# Patient Record
Sex: FEMALE | Race: WHITE | Employment: OTHER
[De-identification: names, ages, dates, MRNs, and addresses within clinical notes are randomized per-mention and may not be internally consistent; named-entity substitution may affect disease eponyms.]

---

## 2017-01-30 RX ORDER — SIMVASTATIN 40 MG
TABLET ORAL
Qty: 90 TABLET | Refills: 0 | Status: SHIPPED | OUTPATIENT
Start: 2017-01-30 | End: 2017-08-31 | Stop reason: SDUPTHER

## 2017-02-06 ENCOUNTER — CARE COORDINATION (OUTPATIENT)
Dept: INTERNAL MEDICINE | Facility: CLINIC | Age: 72
End: 2017-02-06

## 2017-02-07 ENCOUNTER — CARE COORDINATION (OUTPATIENT)
Dept: INTERNAL MEDICINE | Facility: CLINIC | Age: 72
End: 2017-02-07

## 2017-02-07 RX ORDER — LISINOPRIL 10 MG/1
TABLET ORAL
Qty: 90 TABLET | Refills: 0 | Status: SHIPPED | OUTPATIENT
Start: 2017-02-07 | End: 2017-03-07 | Stop reason: SDUPTHER

## 2017-03-07 ENCOUNTER — OFFICE VISIT (OUTPATIENT)
Dept: INTERNAL MEDICINE | Facility: CLINIC | Age: 72
End: 2017-03-07

## 2017-03-07 VITALS
HEART RATE: 63 BPM | WEIGHT: 163 LBS | DIASTOLIC BLOOD PRESSURE: 85 MMHG | SYSTOLIC BLOOD PRESSURE: 140 MMHG | BODY MASS INDEX: 27.83 KG/M2 | OXYGEN SATURATION: 97 % | HEIGHT: 64 IN

## 2017-03-07 DIAGNOSIS — I48.91 ATRIAL FIBRILLATION, UNSPECIFIED TYPE (HCC): ICD-10-CM

## 2017-03-07 DIAGNOSIS — I10 ESSENTIAL HYPERTENSION: ICD-10-CM

## 2017-03-07 DIAGNOSIS — Z23 NEED FOR PROPHYLACTIC VACCINATION AGAINST STREPTOCOCCUS PNEUMONIAE (PNEUMOCOCCUS) AND INFLUENZA: Primary | ICD-10-CM

## 2017-03-07 DIAGNOSIS — K21.9 GASTROESOPHAGEAL REFLUX DISEASE WITHOUT ESOPHAGITIS: ICD-10-CM

## 2017-03-07 PROCEDURE — 99214 OFFICE O/P EST MOD 30 MIN: CPT | Performed by: INTERNAL MEDICINE

## 2017-03-07 PROCEDURE — 90670 PCV13 VACCINE IM: CPT | Performed by: INTERNAL MEDICINE

## 2017-03-07 PROCEDURE — G0009 ADMIN PNEUMOCOCCAL VACCINE: HCPCS | Performed by: INTERNAL MEDICINE

## 2017-03-07 RX ORDER — LISINOPRIL 10 MG/1
10 TABLET ORAL DAILY
Qty: 90 TABLET | Refills: 3 | Status: ON HOLD | OUTPATIENT
Start: 2017-03-07 | End: 2018-01-13 | Stop reason: HOSPADM

## 2017-03-07 RX ORDER — DIGOXIN 125 MCG
125 TABLET ORAL DAILY
Qty: 90 TABLET | Refills: 3 | Status: SHIPPED | OUTPATIENT
Start: 2017-03-07 | End: 2017-12-13 | Stop reason: ALTCHOICE

## 2017-03-07 ASSESSMENT — ENCOUNTER SYMPTOMS: HEARTBURN: 1

## 2017-08-31 RX ORDER — SIMVASTATIN 40 MG
TABLET ORAL
Qty: 90 TABLET | Refills: 0 | Status: SHIPPED | OUTPATIENT
Start: 2017-08-31 | End: 2017-11-03 | Stop reason: SDUPTHER

## 2017-09-12 ENCOUNTER — OFFICE VISIT (OUTPATIENT)
Dept: INTERNAL MEDICINE | Age: 72
End: 2017-09-12
Payer: MEDICARE

## 2017-09-12 VITALS
HEIGHT: 64 IN | OXYGEN SATURATION: 96 % | WEIGHT: 188.4 LBS | BODY MASS INDEX: 32.17 KG/M2 | SYSTOLIC BLOOD PRESSURE: 119 MMHG | DIASTOLIC BLOOD PRESSURE: 83 MMHG | HEART RATE: 56 BPM | RESPIRATION RATE: 12 BRPM

## 2017-09-12 DIAGNOSIS — E78.00 HYPERCHOLESTEREMIA: Primary | ICD-10-CM

## 2017-09-12 DIAGNOSIS — Z12.31 ENCOUNTER FOR SCREENING MAMMOGRAM FOR MALIGNANT NEOPLASM OF BREAST: ICD-10-CM

## 2017-09-12 DIAGNOSIS — Z12.11 COLON CANCER SCREENING: ICD-10-CM

## 2017-09-12 DIAGNOSIS — I10 ESSENTIAL HYPERTENSION: ICD-10-CM

## 2017-09-12 DIAGNOSIS — Z12.39 BREAST CANCER SCREENING: ICD-10-CM

## 2017-09-12 DIAGNOSIS — Z23 NEED FOR PROPHYLACTIC VACCINATION AND INOCULATION AGAINST INFLUENZA: ICD-10-CM

## 2017-09-12 PROCEDURE — 99214 OFFICE O/P EST MOD 30 MIN: CPT | Performed by: INTERNAL MEDICINE

## 2017-09-12 ASSESSMENT — PATIENT HEALTH QUESTIONNAIRE - PHQ9
SUM OF ALL RESPONSES TO PHQ QUESTIONS 1-9: 0
1. LITTLE INTEREST OR PLEASURE IN DOING THINGS: 0
2. FEELING DOWN, DEPRESSED OR HOPELESS: 0
SUM OF ALL RESPONSES TO PHQ9 QUESTIONS 1 & 2: 0

## 2017-09-12 ASSESSMENT — ENCOUNTER SYMPTOMS
GASTROINTESTINAL NEGATIVE: 1
EYES NEGATIVE: 1
RESPIRATORY NEGATIVE: 1

## 2017-10-09 ENCOUNTER — HOSPITAL ENCOUNTER (OUTPATIENT)
Dept: MAMMOGRAPHY | Age: 72
Discharge: HOME | End: 2017-10-09
Payer: MEDICARE

## 2017-10-09 DIAGNOSIS — Z12.39 BREAST CANCER SCREENING: ICD-10-CM

## 2017-10-09 DIAGNOSIS — Z12.31 ENCOUNTER FOR SCREENING MAMMOGRAM FOR MALIGNANT NEOPLASM OF BREAST: ICD-10-CM

## 2017-10-09 PROCEDURE — 77063 BREAST TOMOSYNTHESIS BI: CPT

## 2017-11-03 RX ORDER — SIMVASTATIN 40 MG
TABLET ORAL
Qty: 90 TABLET | Refills: 2 | Status: SHIPPED | OUTPATIENT
Start: 2017-11-03 | End: 2019-10-31 | Stop reason: SDUPTHER

## 2017-12-08 ENCOUNTER — TELEPHONE (OUTPATIENT)
Dept: OTHER | Facility: CLINIC | Age: 72
End: 2017-12-08

## 2017-12-13 ENCOUNTER — APPOINTMENT (OUTPATIENT)
Dept: GENERAL RADIOLOGY | Age: 72
DRG: 308 | End: 2017-12-13
Payer: MEDICARE

## 2017-12-13 ENCOUNTER — HOSPITAL ENCOUNTER (INPATIENT)
Age: 72
LOS: 6 days | Discharge: HOME | DRG: 308 | End: 2017-12-19
Attending: EMERGENCY MEDICINE | Admitting: INTERNAL MEDICINE
Payer: MEDICARE

## 2017-12-13 ENCOUNTER — OFFICE VISIT (OUTPATIENT)
Dept: INTERNAL MEDICINE | Age: 72
End: 2017-12-13
Payer: MEDICARE

## 2017-12-13 ENCOUNTER — APPOINTMENT (OUTPATIENT)
Dept: CT IMAGING | Age: 72
DRG: 308 | End: 2017-12-13
Payer: MEDICARE

## 2017-12-13 VITALS
WEIGHT: 191 LBS | HEIGHT: 64 IN | DIASTOLIC BLOOD PRESSURE: 90 MMHG | BODY MASS INDEX: 32.61 KG/M2 | SYSTOLIC BLOOD PRESSURE: 130 MMHG

## 2017-12-13 DIAGNOSIS — I49.9 IRREGULAR HEART RATE: ICD-10-CM

## 2017-12-13 DIAGNOSIS — R06.02 SHORTNESS OF BREATH: ICD-10-CM

## 2017-12-13 DIAGNOSIS — I48.91 ATRIAL FIBRILLATION WITH RAPID VENTRICULAR RESPONSE (HCC): Primary | ICD-10-CM

## 2017-12-13 DIAGNOSIS — R42 LIGHT HEADEDNESS: ICD-10-CM

## 2017-12-13 PROBLEM — Z98.890 HX OF CARDIAC CATH: Status: ACTIVE | Noted: 2017-12-13

## 2017-12-13 PROBLEM — R55 SYNCOPE: Status: ACTIVE | Noted: 2017-12-13

## 2017-12-13 PROBLEM — E87.70 FLUID OVERLOAD: Status: ACTIVE | Noted: 2017-12-13

## 2017-12-13 LAB
ANION GAP SERPL CALCULATED.3IONS-SCNC: 14 MMOL/L (ref 9–17)
BNP INTERPRETATION: ABNORMAL
BUN BLDV-MCNC: 8 MG/DL (ref 8–23)
BUN/CREAT BLD: ABNORMAL (ref 9–20)
CALCIUM SERPL-MCNC: 8.9 MG/DL (ref 8.6–10.4)
CHLORIDE BLD-SCNC: 100 MMOL/L (ref 98–107)
CO2: 26 MMOL/L (ref 20–31)
CREAT SERPL-MCNC: 0.75 MG/DL (ref 0.5–0.9)
D-DIMER QUANTITATIVE: 0.23 MG/L FEU
GFR AFRICAN AMERICAN: >60 ML/MIN
GFR NON-AFRICAN AMERICAN: >60 ML/MIN
GFR SERPL CREATININE-BSD FRML MDRD: ABNORMAL ML/MIN/{1.73_M2}
GFR SERPL CREATININE-BSD FRML MDRD: ABNORMAL ML/MIN/{1.73_M2}
GLUCOSE BLD-MCNC: 102 MG/DL (ref 70–99)
HCT VFR BLD CALC: 43.2 % (ref 36.3–47.1)
HEMOGLOBIN: 14.2 G/DL (ref 11.9–15.1)
INR BLD: 1.1
MCH RBC QN AUTO: 32.1 PG (ref 25.2–33.5)
MCHC RBC AUTO-ENTMCNC: 32.9 G/DL (ref 28.4–34.8)
MCV RBC AUTO: 97.7 FL (ref 82.6–102.9)
PARTIAL THROMBOPLASTIN TIME: 32.5 SEC (ref 21.3–31.3)
PDW BLD-RTO: 13.2 % (ref 11.8–14.4)
PLATELET # BLD: 218 K/UL (ref 138–453)
PMV BLD AUTO: 9.7 FL (ref 8.1–13.5)
POC TROPONIN I: 0 NG/ML (ref 0–0.1)
POC TROPONIN I: 0.01 NG/ML (ref 0–0.1)
POC TROPONIN INTERP: NORMAL
POC TROPONIN INTERP: NORMAL
POTASSIUM SERPL-SCNC: 3.6 MMOL/L (ref 3.7–5.3)
PRO-BNP: 2805 PG/ML
PROTHROMBIN TIME: 11.4 SEC (ref 9.4–12.6)
RBC # BLD: 4.42 M/UL (ref 3.95–5.11)
SODIUM BLD-SCNC: 140 MMOL/L (ref 135–144)
WBC # BLD: 11.2 K/UL (ref 3.5–11.3)

## 2017-12-13 PROCEDURE — 84484 ASSAY OF TROPONIN QUANT: CPT

## 2017-12-13 PROCEDURE — 85610 PROTHROMBIN TIME: CPT

## 2017-12-13 PROCEDURE — 2500000003 HC RX 250 WO HCPCS: Performed by: EMERGENCY MEDICINE

## 2017-12-13 PROCEDURE — 70450 CT HEAD/BRAIN W/O DYE: CPT

## 2017-12-13 PROCEDURE — 71010 XR CHEST PORTABLE: CPT

## 2017-12-13 PROCEDURE — 2580000003 HC RX 258: Performed by: EMERGENCY MEDICINE

## 2017-12-13 PROCEDURE — 2500000003 HC RX 250 WO HCPCS

## 2017-12-13 PROCEDURE — 6370000000 HC RX 637 (ALT 250 FOR IP): Performed by: STUDENT IN AN ORGANIZED HEALTH CARE EDUCATION/TRAINING PROGRAM

## 2017-12-13 PROCEDURE — 83735 ASSAY OF MAGNESIUM: CPT

## 2017-12-13 PROCEDURE — 85027 COMPLETE CBC AUTOMATED: CPT

## 2017-12-13 PROCEDURE — 83880 ASSAY OF NATRIURETIC PEPTIDE: CPT

## 2017-12-13 PROCEDURE — 80048 BASIC METABOLIC PNL TOTAL CA: CPT

## 2017-12-13 PROCEDURE — 2000000000 HC ICU R&B

## 2017-12-13 PROCEDURE — 99213 OFFICE O/P EST LOW 20 MIN: CPT | Performed by: FAMILY MEDICINE

## 2017-12-13 PROCEDURE — 85730 THROMBOPLASTIN TIME PARTIAL: CPT

## 2017-12-13 PROCEDURE — 85379 FIBRIN DEGRADATION QUANT: CPT

## 2017-12-13 PROCEDURE — 93005 ELECTROCARDIOGRAM TRACING: CPT

## 2017-12-13 PROCEDURE — 36415 COLL VENOUS BLD VENIPUNCTURE: CPT

## 2017-12-13 PROCEDURE — 99285 EMERGENCY DEPT VISIT HI MDM: CPT

## 2017-12-13 PROCEDURE — 93000 ELECTROCARDIOGRAM COMPLETE: CPT | Performed by: FAMILY MEDICINE

## 2017-12-13 PROCEDURE — 84443 ASSAY THYROID STIM HORMONE: CPT

## 2017-12-13 RX ORDER — OMEPRAZOLE 20 MG/1
20 CAPSULE, DELAYED RELEASE ORAL DAILY
Status: DISCONTINUED | OUTPATIENT
Start: 2017-12-14 | End: 2017-12-19 | Stop reason: HOSPADM

## 2017-12-13 RX ORDER — SODIUM CHLORIDE 0.9 % (FLUSH) 0.9 %
10 SYRINGE (ML) INJECTION EVERY 12 HOURS SCHEDULED
Status: DISCONTINUED | OUTPATIENT
Start: 2017-12-13 | End: 2017-12-19 | Stop reason: HOSPADM

## 2017-12-13 RX ORDER — SIMVASTATIN 40 MG
40 TABLET ORAL NIGHTLY
Status: DISCONTINUED | OUTPATIENT
Start: 2017-12-13 | End: 2017-12-19 | Stop reason: HOSPADM

## 2017-12-13 RX ORDER — POTASSIUM CHLORIDE 20 MEQ/1
40 TABLET, EXTENDED RELEASE ORAL PRN
Status: DISCONTINUED | OUTPATIENT
Start: 2017-12-13 | End: 2017-12-19 | Stop reason: HOSPADM

## 2017-12-13 RX ORDER — FUROSEMIDE 10 MG/ML
20 INJECTION INTRAMUSCULAR; INTRAVENOUS ONCE
Status: COMPLETED | OUTPATIENT
Start: 2017-12-13 | End: 2017-12-14

## 2017-12-13 RX ORDER — SODIUM CHLORIDE 0.9 % (FLUSH) 0.9 %
10 SYRINGE (ML) INJECTION PRN
Status: DISCONTINUED | OUTPATIENT
Start: 2017-12-13 | End: 2017-12-19 | Stop reason: HOSPADM

## 2017-12-13 RX ORDER — DILTIAZEM HYDROCHLORIDE 5 MG/ML
10 INJECTION INTRAVENOUS ONCE
Status: COMPLETED | OUTPATIENT
Start: 2017-12-13 | End: 2017-12-13

## 2017-12-13 RX ORDER — LISINOPRIL 10 MG/1
10 TABLET ORAL DAILY
Status: DISCONTINUED | OUTPATIENT
Start: 2017-12-13 | End: 2017-12-15

## 2017-12-13 RX ORDER — DABIGATRAN ETEXILATE 150 MG/1
150 CAPSULE, COATED PELLETS ORAL 2 TIMES DAILY
Status: DISCONTINUED | OUTPATIENT
Start: 2017-12-13 | End: 2017-12-13

## 2017-12-13 RX ORDER — POTASSIUM CHLORIDE 7.45 MG/ML
10 INJECTION INTRAVENOUS PRN
Status: DISCONTINUED | OUTPATIENT
Start: 2017-12-13 | End: 2017-12-19 | Stop reason: HOSPADM

## 2017-12-13 RX ORDER — METOPROLOL TARTRATE 50 MG/1
25 TABLET, FILM COATED ORAL 2 TIMES DAILY
Status: DISCONTINUED | OUTPATIENT
Start: 2017-12-13 | End: 2017-12-14

## 2017-12-13 RX ORDER — HEPARIN SODIUM 10000 [USP'U]/100ML
1000 INJECTION, SOLUTION INTRAVENOUS CONTINUOUS
Status: DISCONTINUED | OUTPATIENT
Start: 2017-12-14 | End: 2017-12-14

## 2017-12-13 RX ORDER — MAGNESIUM SULFATE 1 G/100ML
2 INJECTION INTRAVENOUS ONCE
Status: COMPLETED | OUTPATIENT
Start: 2017-12-14 | End: 2017-12-14

## 2017-12-13 RX ORDER — ACETAMINOPHEN 325 MG/1
650 TABLET ORAL EVERY 4 HOURS PRN
Status: DISCONTINUED | OUTPATIENT
Start: 2017-12-13 | End: 2017-12-19 | Stop reason: HOSPADM

## 2017-12-13 RX ORDER — DILTIAZEM HYDROCHLORIDE 5 MG/ML
INJECTION INTRAVENOUS
Status: COMPLETED
Start: 2017-12-13 | End: 2017-12-13

## 2017-12-13 RX ORDER — POTASSIUM CHLORIDE 20MEQ/15ML
40 LIQUID (ML) ORAL PRN
Status: DISCONTINUED | OUTPATIENT
Start: 2017-12-13 | End: 2017-12-19 | Stop reason: HOSPADM

## 2017-12-13 RX ORDER — ONDANSETRON 2 MG/ML
4 INJECTION INTRAMUSCULAR; INTRAVENOUS EVERY 6 HOURS PRN
Status: DISCONTINUED | OUTPATIENT
Start: 2017-12-13 | End: 2017-12-19 | Stop reason: HOSPADM

## 2017-12-13 RX ADMIN — DILTIAZEM HYDROCHLORIDE 25 MG: 5 INJECTION INTRAVENOUS at 16:08

## 2017-12-13 RX ADMIN — DILTIAZEM HYDROCHLORIDE 2.5 MG/HR: 5 INJECTION INTRAVENOUS at 16:15

## 2017-12-13 RX ADMIN — DILTIAZEM HYDROCHLORIDE 10 MG: 5 INJECTION, SOLUTION INTRAVENOUS at 16:19

## 2017-12-13 RX ADMIN — METOPROLOL TARTRATE 25 MG: 50 TABLET, FILM COATED ORAL at 19:49

## 2017-12-13 ASSESSMENT — ENCOUNTER SYMPTOMS
COUGH: 0
SHORTNESS OF BREATH: 1
NAUSEA: 0
VOMITING: 0

## 2017-12-13 NOTE — PROGRESS NOTES
Visit Information    Have you changed or started any medications since your last visit including any over-the-counter medicines, vitamins, or herbal medicines? no   Are you having any side effects from any of your medications? -  no  Have you stopped taking any of your medications? Is so, why? -  no    Have you seen any other physician or provider since your last visit? Yes - Records Requested  Have you had any other diagnostic tests since your last visit? Yes - Records Requested  Have you been seen in the emergency room and/or had an admission to a hospital since we last saw you? No  Have you had your routine dental cleaning in the past 6 months? no    Have you activated your [employer] account? If not, what are your barriers?  Yes     Patient Care Team:  Nahum Rodriguez MD as PCP - Mary Ramos MD as PCP - Internal Medicine (Internal Medicine)  Nahum Rodriguez MD as PCP - Family Medicine (Internal Medicine)  Nahum Rodriguez MD as PCP -  (Internal Medicine)    Medical History Review  Past Medical, Family, and Social History reviewed and does contribute to the patient presenting condition    Health Maintenance   Topic Date Due    Colon cancer screen colonoscopy  05/31/1993    Flu vaccine (1) 09/01/2017    Breast cancer screen  10/09/2019    DTaP/Tdap/Td vaccine (2 - Td) 10/20/2019    Lipid screen  02/05/2020    Zostavax vaccine  Addressed    DEXA (modify frequency per FRAX score)  Completed    Pneumococcal low/med risk  Completed

## 2017-12-13 NOTE — ED PROVIDER NOTES
QUANTITATIVE   Result Value Ref Range    D-Dimer, Quant 0.23 mg/L FEU   POCT troponin   Result Value Ref Range    POC Troponin I 0.00 0.00 - 0.10 ng/mL    POC Troponin Interp       The Troponin-I (POC) results cannot be compared to the Troponin-T results. IMPRESSION: 72yoF SOB/light-headed, AFibRVR on presentation without hypotension. On exam: awake/alert/appropriately oriented/no acute distress/speaking in full sentences. Afebrile, nontachypneic, tachycardic and irregular. Lungs clear bilat, normal cardiac sounds, abd snt/nondistended. No lateralizing neurological deficits. No midline cspine ttp. Posterior scalp ttp w/o deformity, bleeding. ?ACS v AFib v PE  ?intracranial injury/pathology given fall and anticoagulation    Anticipate admission/cardiology consult    F/u labwork ?lytes v anemia v infection      Neg stress Feb 2017  First trop <cutoff, no anemia, no lyte abnml, ddimer <cutoff, BNP elev 2300. CXR shows mild pulm edema. CTh pending. EKG  Afib RVR,     Narrow QRS at 100ms    No evidence of ST elev/depr    All EKG's are interpreted by the Emergency Department Physician who either signs or Co-signs this chart in the absence of a cardiologist.        PROCEDURES:  None    CONSULTS:  IP CONSULT TO INTERNAL MEDICINE    CRITICAL CARE:  None    FINAL IMPRESSION      1. Atrial fibrillation with rapid ventricular response ([address]    2. Light headedness    3. Shortness of breath          DISPOSITION / PLAN     DISPOSITION   Admitted      PATIENT REFERRED TO:  No follow-up provider specified.     DISCHARGE MEDICATIONS:  New Prescriptions    No medications on file       Juli Marion MD  Emergency Medicine Resident    (Please note that portions of this note were completed with a voice recognition program.  Efforts were made to edit the dictations but occasionally words are mis-transcribed.)       Juli Marion MD  Resident  12/13/17 0470

## 2017-12-13 NOTE — PATIENT INSTRUCTIONS
Patient Education        Fainting: Care Instructions  Your Care Instructions    When you faint, or pass out, you lose consciousness for a short time. A brief drop in blood flow to the brain often causes it. When you fall or lie down, more blood flows to your brain and you regain consciousness. Emotional stress, pain, or overheating-especially if you have been standing-can make you faint. In these cases, fainting is usually not serious. But fainting can be a sign of a more serious problem. Your doctor may want you to have more tests to rule out other causes. The treatment you need depends on the reason why you fainted. The doctor has checked you carefully, but problems can develop later. If you notice any problems or new symptoms, get medical treatment right away. Follow-up care is a key part of your treatment and safety. Be sure to make and go to all appointments, and call your doctor if you are having problems. It's also a good idea to know your test results and keep a list of the medicines you take. How can you care for yourself at home? · Drink plenty of fluids to prevent dehydration. If you have kidney, heart, or liver disease and have to limit fluids, talk with your doctor before you increase your fluid intake. When should you call for help? Call 911 anytime you think you may need emergency care. For example, call if:  ? · You have symptoms of a heart problem. These may include:  ¨ Chest pain or pressure. ¨ Severe trouble breathing. ¨ A fast or irregular heartbeat. ¨ Lightheadedness or sudden weakness. ¨ Coughing up pink, foamy mucus. ¨ Passing out. After you call 911, the  may tell you to chew 1 adult-strength or 2 to 4 low-dose aspirin. Wait for an ambulance. Do not try to drive yourself. ? · You have symptoms of a stroke. These may include:  ¨ Sudden numbness, tingling, weakness, or loss of movement in your face, arm, or leg, especially on only one side of your body.   ¨ Sudden vision changes. ¨ Sudden trouble speaking. ¨ Sudden confusion or trouble understanding simple statements. ¨ Sudden problems with walking or balance. ¨ A sudden, severe headache that is different from past headaches. ? · You passed out (lost consciousness) again. ? Watch closely for changes in your health, and be sure to contact your doctor if:  ? · You do not get better as expected. Where can you learn more? Go to [email or web address] and sign in to your [employer] account. Enter X309 in the [employer] box to learn more about \"Fainting: Care Instructions. \"     If you do not have an account, please click on the \"Sign Up Now\" link. Current as of: March 20, 2017  Content Version: 11.4  © 1315-4933 Healthwise, Incorporated. Care instructions adapted under license by [facility] ([facility]). If you have questions about a medical condition or this instruction, always ask your healthcare professional. [place] [address] any warranty or liability for your use of this information.

## 2017-12-13 NOTE — CARE COORDINATION
Case Management Initial Discharge Plan  Elissa Potter,         Readmission Risk              Readmission Risk:        19.5       Age 72 or Greater:  1    Admitted from SNF or Requires Paid or Family Care:  0    Currently has CHF,COPD,ARF,CRI,or is on dialysis:  0    Takes more than 5 Prescription Medications:  4    Takes Digoxin,Insulin,Anticoagulants,Narcotics or ASA/Plavix:  [address] in Past 12 Months:  10    On Disability:  0    Patient Considers own Health:  2.5            Met with:patient or spouse/SO to discuss discharge plans. Information verified: address, contacts, phone number, , insurance Yes  PCP: Ary Vaughn MD  Date of last visit: today, sent to ER from office    Insurance Provider: [employer]    Discharge Planning  Current Residence:  Private Residence  Living Arrangements:  Spouse/Significant Other   Home has 1 stories/no stairs to climb  Support Systems:  Spouse/Significant Other  Current Services PTA:  None Supplier: na  Patient able to perform ADL's:Independent  DME used to aid ambulation prior to admission: none/during admission none    Potential Assistance Needed:  N/A    [facility]  Potential Assistance Purchasing Medications:  No  Does patient want to participate in local refill/ meds to beds program?  No    Patient agreeable to home care: No  [place] of choice provided:  n/a      Type of Home Care Services:  None  Patient expects to be discharged to:  Home    Prior SNF/Rehab Placement and Facility: no  Agreeable to SNF/Rehab: No  [place] of choice provided: n/a   Evaluation: n/a    Expected Discharge date:      Follow Up Appointment: Best Day/ Time:      Transportation provider: family  Transportation arrangements needed for discharge: No    Discharge Plan: Home with family support        Electronically signed by Duong Drake RN on 17 at 6:52 PM

## 2017-12-13 NOTE — ED PROVIDER NOTES
[address] [facility]     Emergency Department     Faculty Attestation    I performed a history and physical examination of the patient and discussed management with the resident. I reviewed the residents note and agree with the documented findings and plan of care. Any areas of disagreement are noted on the chart. I was personally present for the key portions of any procedures. I have documented in the chart those procedures where I was not present during the key portions. I have reviewed the emergency nurses triage note. I agree with the chief complaint, past medical history, past surgical history, allergies, medications, social and family history as documented unless otherwise noted below. Documentation of the HPI, Physical Exam and Medical Decision Making performed by medical students or scribes is based on my personal performance of the HPI, PE and MDM. For Midlevel providers: I have personally seen and evaluated the patient. I find the patient's history and physical exam are consistent with the NP/PA documentation. I agree with the care provided, treatment rendered, disposition and follow-up plan      Patient with history of atrial fibrillation. Patient presents in atrial flutter fibrillation with rapid ventricular response. Patient with heart rate in the 170s to 180s on arrival.  Patient with normal heart rate after 10 mg of Cardizem. Cardizem drip started. Patient with significant resolution of symptoms. Patient maintained on Cardizem drip. History of fall. History of anticoagulation. We'll get CT head.       Critical Care     EKG #1  EKG Interpretation    Interpreted by me    Rhythm: Atrial fibrillation  Rate: Tachycardia  Axis: normal  Ectopy: none  Conduction: normal  ST Segments: no acute change  T Waves: no acute change  Q Waves: none    Clinical Impression: Atrial fibrillation with rapid ventricular response      EKG Interpretation    Interpreted by me    Rhythm: A trial fibrillation   Rate: normal  Axis: normal  Ectopy: none  Conduction: normal  ST Segments: no acute change  T Waves: no acute change  Q Waves: none    Clinical Impression: afibrillation with rate controlled    [place] MD Chai Warren MD  12/13/17 1655

## 2017-12-14 LAB
ANION GAP SERPL CALCULATED.3IONS-SCNC: 16 MMOL/L (ref 9–17)
BUN BLDV-MCNC: 7 MG/DL (ref 8–23)
BUN/CREAT BLD: ABNORMAL (ref 9–20)
CALCIUM SERPL-MCNC: 8.9 MG/DL (ref 8.6–10.4)
CHLORIDE BLD-SCNC: 97 MMOL/L (ref 98–107)
CO2: 23 MMOL/L (ref 20–31)
CREAT SERPL-MCNC: 0.6 MG/DL (ref 0.5–0.9)
EKG ATRIAL RATE: 163 BPM
EKG ATRIAL RATE: 357 BPM
EKG ATRIAL RATE: 375 BPM
EKG ATRIAL RATE: 72 BPM
EKG P AXIS: 32 DEGREES
EKG P-R INTERVAL: 166 MS
EKG Q-T INTERVAL: 300 MS
EKG Q-T INTERVAL: 332 MS
EKG Q-T INTERVAL: 338 MS
EKG Q-T INTERVAL: 392 MS
EKG QRS DURATION: 100 MS
EKG QRS DURATION: 88 MS
EKG QRS DURATION: 90 MS
EKG QRS DURATION: 90 MS
EKG QTC CALCULATION (BAZETT): 429 MS
EKG QTC CALCULATION (BAZETT): 438 MS
EKG QTC CALCULATION (BAZETT): 449 MS
EKG QTC CALCULATION (BAZETT): 492 MS
EKG R AXIS: 48 DEGREES
EKG R AXIS: 55 DEGREES
EKG R AXIS: 63 DEGREES
EKG R AXIS: 70 DEGREES
EKG T AXIS: -107 DEGREES
EKG T AXIS: 14 DEGREES
EKG T AXIS: 20 DEGREES
EKG T AXIS: 62 DEGREES
EKG VENTRICULAR RATE: 101 BPM
EKG VENTRICULAR RATE: 110 BPM
EKG VENTRICULAR RATE: 162 BPM
EKG VENTRICULAR RATE: 72 BPM
GFR AFRICAN AMERICAN: >60 ML/MIN
GFR NON-AFRICAN AMERICAN: >60 ML/MIN
GFR SERPL CREATININE-BSD FRML MDRD: ABNORMAL ML/MIN/{1.73_M2}
GFR SERPL CREATININE-BSD FRML MDRD: ABNORMAL ML/MIN/{1.73_M2}
GLUCOSE BLD-MCNC: 130 MG/DL (ref 70–99)
LV EF: 55 %
LVEF MODALITY: NORMAL
MAGNESIUM: 1.8 MG/DL (ref 1.6–2.6)
PARTIAL THROMBOPLASTIN TIME: 29.1 SEC (ref 21.3–31.3)
PARTIAL THROMBOPLASTIN TIME: 34.9 SEC (ref 21.3–31.3)
PARTIAL THROMBOPLASTIN TIME: 39.2 SEC (ref 21.3–31.3)
POTASSIUM SERPL-SCNC: 3.4 MMOL/L (ref 3.7–5.3)
SODIUM BLD-SCNC: 136 MMOL/L (ref 135–144)
TROPONIN INTERP: NORMAL
TROPONIN INTERP: NORMAL
TROPONIN T: <0.03 NG/ML
TROPONIN T: <0.03 NG/ML
TSH SERPL DL<=0.05 MIU/L-ACNC: 2.43 MIU/L (ref 0.3–5)

## 2017-12-14 PROCEDURE — 2000000000 HC ICU R&B

## 2017-12-14 PROCEDURE — 6360000002 HC RX W HCPCS: Performed by: STUDENT IN AN ORGANIZED HEALTH CARE EDUCATION/TRAINING PROGRAM

## 2017-12-14 PROCEDURE — 6370000000 HC RX 637 (ALT 250 FOR IP): Performed by: STUDENT IN AN ORGANIZED HEALTH CARE EDUCATION/TRAINING PROGRAM

## 2017-12-14 PROCEDURE — 6360000002 HC RX W HCPCS: Performed by: INTERNAL MEDICINE

## 2017-12-14 PROCEDURE — 2580000003 HC RX 258: Performed by: STUDENT IN AN ORGANIZED HEALTH CARE EDUCATION/TRAINING PROGRAM

## 2017-12-14 PROCEDURE — 85730 THROMBOPLASTIN TIME PARTIAL: CPT

## 2017-12-14 PROCEDURE — 36415 COLL VENOUS BLD VENIPUNCTURE: CPT

## 2017-12-14 PROCEDURE — 93306 TTE W/DOPPLER COMPLETE: CPT

## 2017-12-14 PROCEDURE — 84484 ASSAY OF TROPONIN QUANT: CPT

## 2017-12-14 PROCEDURE — 80048 BASIC METABOLIC PNL TOTAL CA: CPT

## 2017-12-14 PROCEDURE — 99223 1ST HOSP IP/OBS HIGH 75: CPT | Performed by: INTERNAL MEDICINE

## 2017-12-14 PROCEDURE — 6370000000 HC RX 637 (ALT 250 FOR IP): Performed by: INTERNAL MEDICINE

## 2017-12-14 PROCEDURE — 94762 N-INVAS EAR/PLS OXIMTRY CONT: CPT

## 2017-12-14 PROCEDURE — 2580000003 HC RX 258: Performed by: INTERNAL MEDICINE

## 2017-12-14 PROCEDURE — 93005 ELECTROCARDIOGRAM TRACING: CPT

## 2017-12-14 RX ORDER — VANCOMYCIN HYDROCHLORIDE 1 G/200ML
1000 INJECTION, SOLUTION INTRAVENOUS ONCE
Status: DISCONTINUED | OUTPATIENT
Start: 2017-12-14 | End: 2017-12-19 | Stop reason: HOSPADM

## 2017-12-14 RX ORDER — DABIGATRAN ETEXILATE 150 MG/1
150 CAPSULE, COATED PELLETS ORAL 2 TIMES DAILY
Status: DISCONTINUED | OUTPATIENT
Start: 2017-12-14 | End: 2017-12-17

## 2017-12-14 RX ORDER — FLECAINIDE ACETATE 100 MG/1
100 TABLET ORAL EVERY 12 HOURS SCHEDULED
Status: DISCONTINUED | OUTPATIENT
Start: 2017-12-14 | End: 2017-12-17

## 2017-12-14 RX ADMIN — DEXTROSE 1 MG/MIN: 5 SOLUTION INTRAVENOUS at 08:34

## 2017-12-14 RX ADMIN — METOPROLOL TARTRATE 25 MG: 25 TABLET ORAL at 20:35

## 2017-12-14 RX ADMIN — LISINOPRIL 10 MG: 10 TABLET ORAL at 07:40

## 2017-12-14 RX ADMIN — Medication 10 ML: at 07:41

## 2017-12-14 RX ADMIN — FLECAINIDE ACETATE 100 MG: 100 TABLET ORAL at 15:23

## 2017-12-14 RX ADMIN — MAGNESIUM SULFATE HEPTAHYDRATE 2 G: 1 INJECTION, SOLUTION INTRAVENOUS at 00:33

## 2017-12-14 RX ADMIN — DABIGATRAN ETEXILATE MESYLATE 150 MG: 150 CAPSULE ORAL at 15:33

## 2017-12-14 RX ADMIN — SIMVASTATIN 40 MG: 40 TABLET, FILM COATED ORAL at 00:57

## 2017-12-14 RX ADMIN — OMEPRAZOLE 20 MG: 20 CAPSULE, DELAYED RELEASE ORAL at 07:40

## 2017-12-14 RX ADMIN — HEPARIN SODIUM AND DEXTROSE 1000 UNITS/HR: 10000; 5 INJECTION INTRAVENOUS at 00:41

## 2017-12-14 RX ADMIN — SIMVASTATIN 40 MG: 40 TABLET, FILM COATED ORAL at 20:34

## 2017-12-14 RX ADMIN — Medication 10 ML: at 00:56

## 2017-12-14 RX ADMIN — FUROSEMIDE 20 MG: 10 INJECTION, SOLUTION INTRAVENOUS at 00:35

## 2017-12-14 RX ADMIN — POTASSIUM CHLORIDE 40 MEQ: 20 TABLET, EXTENDED RELEASE ORAL at 01:19

## 2017-12-14 RX ADMIN — DEXTROSE 1 MG/MIN: 5 SOLUTION INTRAVENOUS at 01:27

## 2017-12-14 RX ADMIN — Medication 10 ML: at 20:36

## 2017-12-14 RX ADMIN — POTASSIUM CHLORIDE 40 MEQ: 20 TABLET, EXTENDED RELEASE ORAL at 06:55

## 2017-12-14 RX ADMIN — METOPROLOL TARTRATE 25 MG: 25 TABLET ORAL at 12:12

## 2017-12-14 NOTE — FLOWSHEET NOTE
Patient arrived to room via stretcher from ED. Patient is alert & oriented X4, able to follow commands and answer questions appropriately. Patient is tachycardic with a HR  130-160, hypertensive 'S  & SOB with exertion. Patient transferred to bed with assistance from staff & was placed on cardiac monitor. 2315 During admission hx patient became quiet and stared off blankly. Writer was able to get patient to respond verbally. Patient reported that she felt like she was going to RABT out\"  During this episode two pauses were recorded on telemetry. One 6 second pause followed by a 4 second pause. Patients HR did return to the 130's and patient did not lose consciousness & was speaking to writer throughout episode. Cardiology notified. Writer spoke with Dr Suzan Corey who ordered magnesium & TSH levels. Advised patient to be connected to life pack and have fast patches on patient, additional orders received & carried out. See MAR. Will continue to monitor patient.

## 2017-12-14 NOTE — CONSULTS
Pulse 69   Temp 97.9 °F (36.6 °C) (Oral)   Resp 16   Ht 5' 4.5\" (1.638 m)   Wt 194 lb 10.7 oz (88.3 kg)   SpO2 98%   BMI 32.90 kg/m²    Constitutional and General Appearance: alert, cooperative, in no distress   HEENT: PERRL, no cervical lymphadenopathy. Normal oral mucosa  Respiratory:  · Normal excursion and expansion without use of accessory muscles  · Resp Auscultation: Good respiratory effort. No for increased work of breathing. On auscultation: clear to auscultation bilaterally, no wheezing, no rales. Cardiovascular:  · The apical impulse is not displaced  · Heart tones are crisp and normal. regular S1 and S2. Murmurs: None   · Jugular venous pulsation Normal  · Thre is no carotid bruit   · Peripheral pulses are symmetrical and full   Abdomen:  · No masses or tenderness  · Bowel sounds present  Extremities:  ·  No Cyanosis or Clubbing  ·  Lower extremity edema: No edema   ·  Skin: Warm and dry  Neurological:  · Not done     DATA:    Diagnostics:      EK2017 3:53 PM  Atrial fibrillation with RVR, diffuse st t changes suggesting ischemia       2017 4:35 am   SR, LAE      Previous cardiac testing:     STRESS 17: No ischemia or infarct. EF 65%.      TTE 16: EF 75%. Mild LVH.      CATH 10/31/11: No significant disease with aneurysmal dilation in mid RCA- EF 55%. Labs:     CBC:   Recent Labs      17   1627   WBC  11.2   HGB  14.2   HCT  43.2   PLT  218     BMP:   Recent Labs      17   1627  17   0406   NA  140  136   K  3.6*  3.4*   CO2  26  23   BUN  8  7*   CREATININE  0.75  0.60   LABGLOM  >60  >60   GLUCOSE  102*  130*     BNP: No results for input(s): BNP in the last 72 hours.   PT/INR:   Recent Labs      17   1627   PROTIME  11.4   INR  1.1     APTT:  Recent Labs      17   0022  17   0406   APTT  29.1  34.9*     CARDIAC ENZYMES:  Recent Labs      17   1555  17   1918   TROPONINI  0.00  0.01     FASTING LIPID

## 2017-12-14 NOTE — PROGRESS NOTES
Physical Therapy  DATE: 2017    NAME: Kami Castaneda  MRN: [id number]   : 1945    Patient not seen this date for Physical Therapy due to:  [] Blood transfusion in progress  [] Hemodialysis  []  Patient Declined  [] Spine Precautions   [] Strict Bedrest  [] Surgery/ Procedure  [] Testing      [x] Other: Cx per RN in am, then in pm too due to permanent pacer placement this date. will check 12/15. [] PT being discontinued at this time. Patient independent. No further needs. [] PT being discontinued at this time as the patient has been transferred to palliative care. No further needs.     Sheri Gutierrez, PT

## 2017-12-14 NOTE — ED NOTES
Pt HR back in afib 110-120. Pt restarted back on Cardizem 5mg/hr. Pt denied SOB or chest pain. Updated on poc, verbalized understanding. Denied any other needs at this time.       Yosvany Riggs RN  12/13/17 6202

## 2017-12-14 NOTE — ED NOTES
Pt noticed to have long pauses on monitor in afib with rvr. Cardizem continued at 10mg/hr. Mukesh Oshea MD and Resident Physician notified of patients rate/rhythm and to bedside to reassess patient.       Dixon Fernández RN  12/13/17 9048

## 2017-12-14 NOTE — ED NOTES
Report received from [facility], [facility]. RN at bedside to reassess patient and draw blood for 2nd troponin. Pt updated on poc, verbalized understanding. Cardizem infusing without issues. Pt remains rhythm controlled. 2nd troponin running. Denied any other needs at this time.       Dion Villarreal RN  12/13/17 0563

## 2017-12-14 NOTE — ED NOTES
Report called to Charlie Chu RN. All questions answered. Pt to be transported via stretcher, on monitor with RN.       Sharron Guevara RN  12/13/17 1651

## 2017-12-14 NOTE — PROGRESS NOTES
[address] [facility] [address]  Occupational Therapy Not Seen Note    Patient not available for Occupational Therapy due to:    [] Testing:    [] Hemodialysis    [] Blood Transfusion in Progress    []Refusal by Patient:    [] Surgery/Procedure:    [] Strict Bedrest    [] Sedation    [] Spine Precautions     [] Pt being transferred to palliative care at this time. Spoke with pt/family and OT services to be defered. [] Pt independent with functional mobility and functional tasks.  Pt with no OT acute care needs at this time, will defer OT eval.    [x] Other: Cx per JOAQUÍN Urbano, not medically appropriate    Next Scheduled Treatment: Re-check 12/14 PM    Signature: Sun Bedoya OTR/L

## 2017-12-14 NOTE — PROGRESS NOTES
Cardiac Testing:     STRESS 02/03/17: No ischemia or infarct. EF 65%.      TTE 07/07/16: EF 75%. Mild LVH.      CATH 10/31/11: No significant disease with aneurysmal dilation in mid RCA- EF 55%.      1. Hypertension. 2. Mild obesity. 3. Paroxysmal atrial fibrillation. 4. GERD.

## 2017-12-14 NOTE — H&P
[facility]     Department of Internal Medicine - Staff Internal Medicine Service          ADMISSION NOTE/HISTORY AND PHYSICAL EXAMINATION       CHIEF COMPLAINT:  No chief complaint on file. HISTORY OBTAIN FROM:  Patient     HISTORY OF PRESENT ILLNESS:      The patient is a pleasant 74 y.o. female with significant past medical history of  Atrial fibrillation presented with c/o shortness of breath since last 3-4 weeks. As per patient her shortness of breath is progressively getting worse and since last 2 days she was getting SOB after small distances. Also c/o chest pressure like symptoms. Patient was also complaining of palpitations and electrical shock like symptoms in the chest. Yesterday morning she had one syncopal episode and she fell down. She did not loose consciousness but hit her head. No nausea, vomiting and diaphoresis. As per  she snores at night but no other symptoms of LELAND. Never had any sleep study done in past.  Last TSH: 06/22/2016. CT scan was normal and her pro-bnp was elevated to 2805. Chest X ray showed mild pulmonary edema. She was started on cardizem gtt in ED. Patient has chronic h/o Afib and she takes Pradexa, Lopressor 25 mg BID. Last ECHO was in 07/072016 and showed EF 75 % with hyperdynamic LV. She was on Digoxin but it was stopped in sep 2017.    She had h/o MVP and GERD in past.     Medical History:   Past Medical History:   Diagnosis Date    Aneurysm of abdominal aorta (HCC)     Atrial fibrillation (HCC)     Cancer (HCC)     breast    ETD (eustachian tube dysfunction)     Facial asymmetries     Fracture, ankle     Gastric ulcer     Hx of cardiac cath     Hx of gastroenteritis hx of noninfective gastroenteritis and colitis    Hyperlipidemia     Hypertension     Jaw pain, non-TMJ     Mammogram abnormal     Mitral valve prolapse syndrome     Pregnancy, incidental        SURGICAL HISTORY:  Past Surgical History:   Procedure Laterality Date    COLONOSCOPY  Complete Colonoscopy for Polyp Removal       MEDS:  Prior to Admission medications    Medication Sig Start Date End Date Taking? Authorizing Provider   simvastatin (ZOCOR) 40 MG tablet TAKE 1 TABLET AT BEDTIME 11/3/17  Yes Zaheer Nichols MD   metoprolol tartrate (LOPRESSOR) 25 MG tablet Take 1 tablet by mouth 2 times daily 3/7/17  Yes Zaheer Nichols MD   lisinopril (PRINIVIL;ZESTRIL) 10 MG tablet Take 1 tablet by mouth daily 3/7/17  Yes Zaheer Nichols MD   dabigatran (PRADAXA) 150 MG capsule Take 1 capsule by mouth 2 times daily 6/23/16  Yes Alysa Moreira MD   omeprazole (PRILOSEC OTC) 20 MG tablet Take 1 tablet by mouth daily 11/3/15  Yes Zaheer Nichols MD   aspirin 81 MG tablet Take 81 mg by mouth daily. Yes Historical Provider, MD     Scheduled Meds:   metoprolol tartrate  25 mg Oral BID    lisinopril  10 mg Oral Daily     Continuous Infusions:   diltiazem (CARDIZEM) 125 mg in dextrose 5% 125 mL infusion Stopped (12/13/17 1950)     PRN Meds:    Allergies   Allergen Reactions    Penicillins     Sulfa Antibiotics Rash       SOCIAL HISTORY:   Social History     Social History    Marital status:      Spouse name: N/A    Number of children: N/A    Years of education: N/A     Occupational History    Not on file. Social History Main Topics    Smoking status: Former Smoker     Types: Cigarettes    Smokeless tobacco: Never Used    Alcohol use Not on file    Drug use: Unknown    Sexual activity: Not on file     Other Topics Concern    Not on file     Social History Narrative    No narrative on file       FAMILY HISTORY:   History reviewed. No pertinent family history. REVIEW OF SYSTEMS:  · Constitutional: Negative for Fever, chills  · Eyes: Negative for visual changes, diplopia  · ENT: Negative for mouth sores, sore throat.   · Cardiovascular: Shortness of breath and chest pain   · Respiratory:Negative for Shortness of breath,cough or wheezing. · Gastrointestinal: Negative for nausea/vomiting, change in bowel habits, abdominal pain  · Genitourinary:Negative for change in bladder habits, dysuria, hematuria.   · Musculoskeletal: Negative for joint pain   · Neurological: Negative for headache, change in muscle strength numbness/tingling  · Psychiatric: negative for change in mood, affect  · Skin no rash, no itching  · Hematology oncology complete and negative      PHYSICAL EXAM:  /61   Pulse 78   Temp 99.2 °F (37.3 °C) (Oral)   Resp 27   Ht 5' 4.5\" (1.638 m)   Wt 190 lb (86.2 kg)   SpO2 100%   BMI 32.11 kg/m²    Wt Readings from Last 3 Encounters:   12/13/17 190 lb (86.2 kg)   12/13/17 191 lb (86.6 kg)   09/12/17 188 lb 6.4 oz (85.5 kg)       Physical exam  General Appearance:    Alert, cooperative, no distress, appears stated age   Head:    Normocephalic, without obvious abnormality, atraumatic      Eyes:    PERRL, conjunctiva/corneas clear, EOM's intact   Ears:    Normal TM's and external ear canals, both ears   Nose:   Nares normal, septum midline, mucosa normal, no drainage        or sinus tenderness   Throat:   Lips, mucosa, and tongue normal; teeth and gums normal   Neck:   Supple, symmetrical, trachea midline, no adenopathy;     thyroid:  no enlargement/tenderness/nodules; no carotid    bruit no JVP , no HJR   Back:     Symmetric, no curvature, ROM normal, no CVA tenderness   Lungs:       Clear to auscultation bilaterally, respirations unlabored no dullness no bb , no wheezing , But minimal basal rales , vent all lobes , diaphram motion normal    Chest Wall:    No tenderness or deformity      Heart:  Ir regular  rate and tachycardic rhythm, S1 and S2 normal, no murmur, rub        or gallop no rvh                           Abdomen:                                                 Pulses:                              Skin:                  Lymph nodes:                    Neurologic:                  Soft, non-tender, bowel sounds active all four quadrants,     no masses, no organomegaly         2+ and symmetric all extremities     Skin color, texture, turgor normal, no rashes or lesions       Cervical, supraclavicular not enlarged or matted or tender      CNII-XII intact, normal strength 5/5 . Sensation grossly normal  and reflexes normal 2+  throughout     Clubbing No  Lower ext edema No1+   [] , 2 +  [] , 3+   []  Upper ext edema No       Musculoskeletal - no joint swelling or tenderness or synovitis          LABS:  CBC:   Recent Labs      12/13/17   1627   WBC  11.2   RBC  4.42   HGB  14.2   HCT  43.2   MCV  97.7   RDW  13.2   PLT  218     BMP:   Recent Labs      12/13/17   1627   NA  140   K  3.6*   CL  100   CO2  26   BUN  8   CREATININE  0.75     ANEMIA STUDIES  No results for input(s): LABIRON, TIBC, FERRITIN, WVFBLFAT90, FOLATE, OCCULTBLD in the last 72 hours. BNP: No results for input(s): BNP in the last 72 hours. PT/INR:   Recent Labs      12/13/17   1627   PROTIME  11.4   INR  1.1     APTT:   Recent Labs      12/13/17   1627   APTT  32.5*     CARDIAC ENZYMES:   Recent Labs      12/13/17   1555  12/13/17   1918   TROPONINI  0.00  0.01     FASTING LIPID PANEL:  Lab Results   Component Value Date    CHOL 268 (H) 02/05/2015    HDL 69 02/05/2015    TRIG 105 02/05/2015     LFTS  No results for input(s): ALKPHOS, ALT, AST, BILITOT, BILIDIR, LABALBU in the last 72 hours. AMYLASE/LIPASE/AMMONIA  No results for input(s): AMYLASE, LIPASE, AMMONIA in the last 72 hours.         ASSESSMENT:     Principal Problem:    Atrial fibrillation with rapid ventricular response (HCC)  Active Problems:    GERD (gastroesophageal reflux disease)    Hypercholesteremia    Essential hypertension    Hx of cardiac cath in 2011    Fluid overload    Syncope    [id number] Score for Atrial Fibrillation Stroke Risk   Risk   Factors  Component Value   C CHF No 0   H HTN Yes 1   A2 Age >= 75 No,  (69 y.o.) 0   D DM No 0   S2 Prior Stroke/TIA No 0   V Vascular Disease No 0   A Age 74-69 Yes,  (69 y.o.) 1   Sc Sex female 1    [id number]  Score  3   Score last updated 41/90/65 4:41 PM    Click here for a link to the UpToDate guideline \"Atrial Fibrillation: Anticoagulation therapy to prevent embolization    Disclaimer: Risk Score calculation is dependent on accuracy of patient problem list and past encounter diagnosis. PLAN:   1. Atrial Fibrillation:   ·  Atrial fibrillation fibrillation with RVR. On Cardizem gtt  · Also give Mag 2 gm, fluid bolus, check Troponin, BMP, Magnesium, EKG  · On Pradaxa   · Troponin x3.    · Telemetry monitor. · Follow up TSH with reflex. Echocardiogram. Cardiology consult. May need sleep study  · Follow up electrolytes; replace as appropriate. Keep K >4 and Mag > 2  · Resume Lopressor 25 mg BID. · ChadVasc score is 3 and on Pradaxa. 2. Acute pulmonary edema:  Pro- BNP elevated. Lasix 20 mg IV. 3. Syncopal episode- CT head normal. Monitor orthostatic vitals. Telemetry monitoring. Cardiology consulted. 4. GERD: On Omeprazole 20 mg . DVT prophylaxis:  On Pradaxa   Diet: NPO after midnight   PT/OT evaluation: Pending   Discharge planning: Pending     Javier Friend MD  Internal Medicine [address] [facility] [place], [facility]  PGY-1  Attending Physician Statement  I have discussed the care of [facility], including pertinent history and exam findings,  with the resident. I have seen and examined the patient and the key elements of all parts of the encounter have been performed by me. I agree with the assessment, plan and orders as documented by the resident with additions . Discussed with cardiology. Plan for pacemaker today  Patient updated    Treatment plan Discussed with nursing staff in detail , all questions answered . Marlee Juarez MD   12/14/2017   1:32 PM    Please note that this chart was generated using voice recognition Dragon dictation software.   Although every effort was made to ensure the accuracy of this automated transcription, some errors in transcription may have occurred.

## 2017-12-14 NOTE — PLAN OF CARE
Problem: SAFETY  Goal: Free from accidental physical injury  Outcome: Ongoing      Problem: DAILY CARE  Goal: Daily care needs are met  Outcome: Met This Shift      Problem: KNOWLEDGE DEFICIT  Goal: Patient/S.O. demonstrates understanding of disease process, treatment plan, medications, and discharge instructions.   Outcome: Met This Shift

## 2017-12-14 NOTE — PROGRESS NOTES
[address] [place] [address]   Department of Internal Medicine -   Internal Medicine Residency Program  ______________________________________________________________________    Patient: Tejal Damon  YOB: 1945   MRN: [id number]    Acct: [de-identified]     Admit date: 2017  Today's date: 17    Admitting Diagnosis: Atrial fibrillation with rapid ventricular response (HCC)    Subjective:   Pt seen and Chart reviewed. Patient was in Afib with RVR last night and had 3 sinus pauses. Longest one was 6 seconds. Patient was started in Amiodarone gtt and heparin last night. Fever:-  Temp (24hrs), Av.4 °F (36.9 °C), Min:97.9 °F (36.6 °C), Max:99.2 °F (37.3 °C)      Blood pressure:   Systolic (26QUG), ZPX:426 , Min:110 , LFA:123     Diastolic (92DWN), DPZ:45, Min:62, Max:121      Urine output in the last 24 hours: In: 429 [I.V.:429]  Out: 1600 [Urine:1600]        Review of systems. · Constitutional: Negative for fever  · Cardiovascular: Negative for lightheadedness/orthostatic symptoms ,chest pain, dyspnea on exertion, palpitations or loss of consciousness. · Respiratory: Negative for cough or wheezing, sputum production, hemoptysis,   · Gastrointestinal: Negative for nausea/vomiting, change in bowel habits, abdominal pain  · Genitourinary:Negative dysuria, trouble voiding, hematuria.   · Neurological: Negative for headache, dizziness,numbness/tingling    Objective:   Vital Sign:  BP (!) 145/80   Pulse 68   Temp 98.1 °F (36.7 °C) (Oral)   Resp 16   Ht 5' 4.5\" (1.638 m)   Wt 194 lb 10.7 oz (88.3 kg)   SpO2 97%   BMI 32.90 kg/m²       Physical Exam:  General appearance:   alert, well appearing, and in no distress  Mental Status: alert, oriented to person, place, and time  Neurologic:  alert, oriented, normal speech, no focal findings or movement disorder noted  Lungs:  clear to auscultation, no wheezes, rales or rhonchi, symmetric air entry  Heart[de-identified] normal rate, regular rhythm, normal S1, S2, no murmurs, rubs, clicks or gallops  Abdomen:  soft, nontender, nondistended, no masses or organomegaly  Extremities: peripheral pulses normal, no pedal edema, no clubbing or cyanosis       Medications:   Scheduled Medications   metoprolol tartrate        metoprolol tartrate  25 mg Oral Q6H    omeprazole  20 mg Oral Daily    simvastatin  40 mg Oral Nightly    sodium chloride flush  10 mL Intravenous 2 times per day    lisinopril  10 mg Oral Daily       PRN Medications  sodium chloride flush 10 mL PRN   acetaminophen 650 mg Q4H PRN   magnesium hydroxide 30 mL Daily PRN   ondansetron 4 mg Q6H PRN   potassium chloride 40 mEq PRN   Or     potassium chloride 40 mEq PRN   Or     potassium chloride 10 mEq PRN       Diagnostic Labs and Imaging    CBC:  Recent Labs      12/13/17   1627   WBC  11.2   HGB  14.2   PLT  218     BMP: Recent Labs      12/13/17   1627  12/14/17   0406   NA  140  136   K  3.6*  3.4*   CL  100  97*   CO2  26  23   BUN  8  7*   CREATININE  0.75  0.60   GLUCOSE  102*  130*     Hepatic: No results for input(s): AST, ALT, ALB, BILITOT, ALKPHOS in the last 72 hours. INR:   Recent Labs      12/13/17   1627   INR  1.1     Troponin:   Recent Labs      12/13/17   1555  12/13/17   1918   TROPONINI  0.00  0.01       Assessment and Plan:   Principal Problem:    Atrial fibrillation with rapid ventricular response (HCC)  Active Problems:    GERD (gastroesophageal reflux disease)    Hypercholesteremia    Essential hypertension    Hx of cardiac cath in 2011    Fluid overload    Syncope    1. Atrial Fibrillation: Was on Cardizem gtt. Had sinus pauses last night longest being 6 Sec. Patient is going for pacemaker today. On lopressor 25 mg BID.      2. Acute pulmonary edema:  Pro- BNP elevated. Lasix 20 mg IV.      3. Syncopal episode- CT head normal. Monitor orthostatic vitals. Telemetry monitoring. Cardiology consulted.      4.  GERD: On Omeprazole 20 mg .         Nini Pedraza, PGY-1, Internal Medicine Resident  [facility]  Attending Physician Statement  I have discussed the care of [facility], including pertinent history and exam findings,  with the resident. I have seen and examined the patient and the key elements of all parts of the encounter have been performed by me. I agree with the assessment, plan and orders as documented by the resident with additions . See history and physical  Treatment plan Discussed with nursing staff in detail , all questions answered . Vincent Mao MD   12/14/2017   1:33 PM    Please note that this chart was generated using voice recognition Dragon dictation software. Although every effort was made to ensure the accuracy of this automated transcription, some errors in transcription may have occurred.

## 2017-12-14 NOTE — FLOWSHEET NOTE
visited to offer [address]. Patient wanted Communion but is NPO. Patient declined prayer as well stating that the  had just been in to anoint her. Chaplains to remain available for support. 12/14/17 1217   Encounter Summary   Services provided to: Patient and family together   Referral/Consult From: Awilda   Continue Visiting (12/14/17)   Complexity of Encounter Low   Length of Encounter 15 minutes   Spiritual Assessment Completed Yes   Routine   Type Initial   Spiritual/Mormon   Type Spiritual support   Assessment Calm; Approachable   Intervention Active listening;Explored feelings, thoughts, concerns   Outcome Expressed gratitude;Engaged in conversation

## 2017-12-15 LAB
EKG ATRIAL RATE: 57 BPM
EKG P AXIS: 75 DEGREES
EKG P-R INTERVAL: 180 MS
EKG Q-T INTERVAL: 432 MS
EKG QRS DURATION: 100 MS
EKG QTC CALCULATION (BAZETT): 420 MS
EKG R AXIS: 49 DEGREES
EKG T AXIS: 64 DEGREES
EKG VENTRICULAR RATE: 57 BPM

## 2017-12-15 PROCEDURE — 97166 OT EVAL MOD COMPLEX 45 MIN: CPT

## 2017-12-15 PROCEDURE — 94620 HC 6-MINUTE WALK TEST/PULM STRESS TEST SIMPLE: CPT

## 2017-12-15 PROCEDURE — 6370000000 HC RX 637 (ALT 250 FOR IP): Performed by: INTERNAL MEDICINE

## 2017-12-15 PROCEDURE — G8978 MOBILITY CURRENT STATUS: HCPCS

## 2017-12-15 PROCEDURE — 2580000003 HC RX 258: Performed by: STUDENT IN AN ORGANIZED HEALTH CARE EDUCATION/TRAINING PROGRAM

## 2017-12-15 PROCEDURE — G8988 SELF CARE GOAL STATUS: HCPCS

## 2017-12-15 PROCEDURE — G8979 MOBILITY GOAL STATUS: HCPCS

## 2017-12-15 PROCEDURE — 97116 GAIT TRAINING THERAPY: CPT

## 2017-12-15 PROCEDURE — 94762 N-INVAS EAR/PLS OXIMTRY CONT: CPT

## 2017-12-15 PROCEDURE — 6370000000 HC RX 637 (ALT 250 FOR IP): Performed by: STUDENT IN AN ORGANIZED HEALTH CARE EDUCATION/TRAINING PROGRAM

## 2017-12-15 PROCEDURE — 93005 ELECTROCARDIOGRAM TRACING: CPT

## 2017-12-15 PROCEDURE — 97162 PT EVAL MOD COMPLEX 30 MIN: CPT

## 2017-12-15 PROCEDURE — 99233 SBSQ HOSP IP/OBS HIGH 50: CPT | Performed by: INTERNAL MEDICINE

## 2017-12-15 PROCEDURE — 2060000000 HC ICU INTERMEDIATE R&B

## 2017-12-15 PROCEDURE — G8987 SELF CARE CURRENT STATUS: HCPCS

## 2017-12-15 PROCEDURE — 97535 SELF CARE MNGMENT TRAINING: CPT

## 2017-12-15 RX ORDER — LISINOPRIL 10 MG/1
10 TABLET ORAL NIGHTLY
Status: DISCONTINUED | OUTPATIENT
Start: 2017-12-15 | End: 2017-12-18

## 2017-12-15 RX ADMIN — DABIGATRAN ETEXILATE MESYLATE 150 MG: 150 CAPSULE ORAL at 07:59

## 2017-12-15 RX ADMIN — OMEPRAZOLE 20 MG: 20 CAPSULE, DELAYED RELEASE ORAL at 07:59

## 2017-12-15 RX ADMIN — DABIGATRAN ETEXILATE MESYLATE 150 MG: 150 CAPSULE ORAL at 20:07

## 2017-12-15 RX ADMIN — METOPROLOL TARTRATE 25 MG: 25 TABLET ORAL at 08:00

## 2017-12-15 RX ADMIN — FLECAINIDE ACETATE 100 MG: 100 TABLET ORAL at 20:07

## 2017-12-15 RX ADMIN — FLECAINIDE ACETATE 100 MG: 100 TABLET ORAL at 07:58

## 2017-12-15 RX ADMIN — Medication 10 ML: at 08:01

## 2017-12-15 RX ADMIN — METOPROLOL TARTRATE 25 MG: 25 TABLET ORAL at 20:08

## 2017-12-15 RX ADMIN — Medication 10 ML: at 20:08

## 2017-12-15 RX ADMIN — SIMVASTATIN 40 MG: 40 TABLET, FILM COATED ORAL at 20:07

## 2017-12-15 NOTE — PROGRESS NOTES
Pain: Denies  Vital Signs  Patient Currently in Pain: Denies  Pre Treatment Pain Screening  Intervention List: Patient able to continue with treatment    Orientation  Orientation  Overall Orientation Status: Within Normal Limits    Social/Functional History  Social/Functional History  Lives With: Spouse  Type of Home: House  Home Layout: One level  Home Access: Stairs to enter without rails  Entrance Stairs - Number of Steps: 1 small step  Bathroom Shower/Tub: Walk-in shower  Bathroom Toilet: Handicap height  Bathroom Equipment: Built-in shower seat  Home Equipment:  (none)  ADL Assistance: Independent  Homemaking Assistance: Independent  Homemaking Responsibilities: Yes  Meal Prep Responsibility: Primary  Laundry Responsibility: Primary  Cleaning Responsibility: Primary  Shopping Responsibility: Primary  Ambulation Assistance: Independent  Transfer Assistance: Independent  Active : Yes  Mode of Transportation: Car  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  Leisure & Hobbies: gardening  Objective          AROM RLE (degrees)  RLE AROM: WFL  AROM LLE (degrees)  LLE AROM : WFL  AROM RUE (degrees)  RUE AROM : WFL  AROM LUE (degrees)  LUE AROM : WFL  Strength RLE  Strength RLE: WFL  Strength LLE  Strength LLE: WFL  Strength RUE  Strength RUE: WFL  Strength LUE  Strength LUE: WFL     Sensation  Overall Sensation Status: WFL  Bed mobility  Scooting: Stand by assistance  Transfers  Sit to Stand: Contact guard assistance  Stand to sit: Contact guard assistance  Ambulation  Ambulation?: Yes  Ambulation 1  Surface: level tile  Device: Rolling Walker  Other Apparatus: O2  Assistance: Contact guard assistance  Quality of Gait: steady, good leslie but decreasing with distance. Distance: 75'x2  Comments: Per RN ok to attempt without 02 on monitor, after 75% pt dropped to 87% and c/o significant dizziness. 02 retuend to 2L, pt sat in chair and recovered.  Pt without dizziness or complaints upon return trip except some fatigue. Balance  Posture: Fair  Sitting - Static: Good  Sitting - Dynamic: Good  Standing - Static: Good;-  Standing - Dynamic: Fair;+  Comments: RW standing balance, decreased balance on initial 75' d/t dizziness        Assessment   Body structures, Functions, Activity limitations: Decreased functional mobility ; Decreased balance;Decreased endurance  Assessment: amb 75' RW CGA. Pt limited by endurance, dizziness without 02. Prognosis: Good  Decision Making: Medium Complexity  Patient Education: PT POC  REQUIRES PT FOLLOW UP: Yes  Activity Tolerance  Activity Tolerance: Patient Tolerated treatment well; Other  Activity Tolerance: dizziness and sP02 to 87% after amb 75' without 02. seated recovery with 2L 02 and s/s dissipated. returned the 76' with 02 and no dizziness noted, only some fatigue at end of ambulation. RN Georgia Munson notified. Pt would benefit from rolling walker at discharged for safety ambulation and overall mobility. Plan   Plan  Times per week: 5-6x/wk  Current Treatment Recommendations: Strengthening, Transfer Training, Endurance Training, Balance Training, Gait Training, Functional Mobility Training, Stair training, Home Exercise Program, Safety Education & Training, Equipment Evaluation, Education, & procurement, Patient/Caregiver Education & Training  Safety Devices  Type of devices: Call light within reach, Left in bed, Patient at risk for falls, Nurse notified, Gait belt  Restraints  Initially in place: No    G-Code  PT G-Codes  Functional Assessment Tool Used: [place] tool  Score: 18  Functional Limitation: Mobility: Walking and moving around  Mobility: Walking and Moving Around Current Status (): At least 20 percent but less than 40 percent impaired, limited or restricted  Mobility: Walking and Moving Around Goal Status ():  At least 1 percent but less than 20 percent impaired, limited or restricted    Goals  Short term goals  Time Frame

## 2017-12-15 NOTE — PROGRESS NOTES
Home Oxygen Evaluation    Home Oxygen Evaluation completed. Patient is on 2 liters per minute via nc. Resting SpO2 = 97%  Resting SpO2 on room air = 92%    SpO2 on room air with exercise = 87%  SpO2 on oxygen as above with exercise = 94%    Nocturnal Oximetry with patient on room air is recommended is SpO2 is between 89% and 95% (requires additional order).     Pippa Crespo  9:50 AM

## 2017-12-15 NOTE — PLAN OF CARE
Problem: SAFETY  Goal: Free from accidental physical injury  Outcome: Ongoing    Goal: Free from intentional harm  Outcome: Ongoing      Problem: Falls - Risk of  Goal: Absence of falls  Outcome: Ongoing

## 2017-12-15 NOTE — PROGRESS NOTES
[address] [place] [address]   Department of Internal Medicine -   Internal Medicine Residency Program  ______________________________________________________________________    Patient: Josué Lewis  YOB: 1945   MRN: [id number]    Acct: [de-identified]     Admit date: 2017  Today's date: 12/15/17    Admitting Diagnosis: Atrial fibrillation with rapid ventricular response (HCC)    Subjective:   Pt seen and Chart reviewed. No acute events overnight  Patient was in normal sinus rhythm. On 2 L o2. Fever:-  Temp (24hrs), Av.2 °F (36.8 °C), Min:97.9 °F (36.6 °C), Max:99 °F (37.2 °C)      Blood pressure:   Systolic (75NWQ), QCY:604 , Min:126 , GIV:580     Diastolic (28RPA), FPQ:12, Min:60, Max:70      Urine output in the last 24 hours: In: 360 [P.O.:360]  Out: 700 [Urine:700]    Fluids:  Not on fluids   Feeding: Eating and drinking okay  Analgesics: Tylenol   DVT Prophylaxis: Pradaxa   GI Prophylaxis: None   Glycemic Control: BG 130s  Bowel management: Had BM yesterday   Indwelling catheter: None       Review of systems. · Constitutional: Negative for fever  · Cardiovascular: Negative for lightheadedness/orthostatic symptoms ,chest pain, dyspnea on exertion, palpitations or loss of consciousness. · Respiratory: Negative for cough or wheezing, sputum production, hemoptysis,   · Gastrointestinal: Negative for nausea/vomiting, change in bowel habits, abdominal pain  · Genitourinary:Negative dysuria, trouble voiding, hematuria.   · Neurological: Negative for headache, dizziness,numbness/tingling    Objective:   Vital Sign:  /66   Pulse 59   Temp 98.1 °F (36.7 °C) (Oral)   Resp 16   Ht 5' 4.5\" (1.638 m)   Wt 186 lb 8.2 oz (84.6 kg)   SpO2 98%   BMI 31.52 kg/m²       Physical Exam:  General appearance:   alert, well appearing, and in no distress  Mental Status: alert, oriented to person, place, and time  Neurologic:  alert, oriented, normal speech, no focal findings or movement disorder noted  Lungs:  clear to auscultation, no wheezes, rales or rhonchi, symmetric air entry  Heart[de-identified] normal rate, regular rhythm, normal S1, S2, no murmurs, rubs, clicks or gallops  Abdomen:  soft, nontender, nondistended, no masses or organomegaly  Extremities: peripheral pulses normal, no pedal edema, no clubbing or cyanosis       Medications:   Scheduled Medications   metoprolol tartrate  25 mg Oral Q6H    vancomycin  1,000 mg Intravenous Once    flecainide  100 mg Oral 2 times per day    dabigatran  150 mg Oral BID    omeprazole  20 mg Oral Daily    simvastatin  40 mg Oral Nightly    sodium chloride flush  10 mL Intravenous 2 times per day    lisinopril  10 mg Oral Daily       PRN Medications  sodium chloride flush 10 mL PRN   acetaminophen 650 mg Q4H PRN   magnesium hydroxide 30 mL Daily PRN   ondansetron 4 mg Q6H PRN   potassium chloride 40 mEq PRN   Or     potassium chloride 40 mEq PRN   Or     potassium chloride 10 mEq PRN       Diagnostic Labs and Imaging    CBC:  Recent Labs      12/13/17   1627   WBC  11.2   HGB  14.2   PLT  218     BMP: Recent Labs      12/13/17   1627  12/14/17   0406   NA  140  136   K  3.6*  3.4*   CL  100  97*   CO2  26  23   BUN  8  7*   CREATININE  0.75  0.60   GLUCOSE  102*  130*     Hepatic: No results for input(s): AST, ALT, ALB, BILITOT, ALKPHOS in the last 72 hours. INR:   Recent Labs      12/13/17   1627   INR  1.1     Troponin:   Recent Labs      12/13/17   1555  12/13/17   1918   TROPONINI  0.00  0.01       Assessment and Plan:   Principal Problem:    Atrial fibrillation with rapid ventricular response (HCC)  Active Problems:    GERD (gastroesophageal reflux disease)    Hypercholesteremia    Essential hypertension    Hx of cardiac cath in 2011    Fluid overload    Syncope    1. Atrial Fibrillation: On lopressor 25 mg Q6. Lilliana Beltran Flecainide 100 mg BID. Pradaxa 150 mg BID. Waiting for ECHO. 2. Acute pulmonary edema:  Pro- BNP elevated.  Received  Lasix 20 mg IV cardiology  Discussed with nursing staff   Ron Vines MD  12/15/2017 12:43 PM

## 2017-12-15 NOTE — PROGRESS NOTES
Occupational Therapy   Occupational Therapy Initial Assessment  Date: 12/15/2017   Patient Name: Dandre Aldridge  MRN: [id number]     : 1945    HISTORY OF PRESENT ILLNESS:       The patient is a pleasant 74 y.o. female with significant past medical history of  Atrial fibrillation presented with c/o shortness of breath since last 3-4 weeks. As per patient her shortness of breath is progressively getting worse and since last 2 days she was getting SOB after small distances. Also c/o chest pressure like symptoms. Patient was also complaining of palpitations and electrical shock like symptoms in the chest. Yesterday morning she had one syncopal episode and she fell down. She did not loose consciousness but hit her head. No nausea, vomiting and diaphoresis. As per  she snores at night but no other symptoms of LELAND. Never had any sleep study done in past.  Last TSH: 2016. CT scan was normal and her pro-bnp was elevated to 2805. Chest X ray showed mild pulmonary edema. She was started on cardizem gtt in ED. Patient has chronic h/o Afib and she takes Pradexa, Lopressor 25 mg BID. Last ECHO was in  and showed EF 75 % with hyperdynamic LV. She was on Digoxin but it was stopped in sep 2017. She had h/o MVP and GERD in past.    Patient Diagnosis(es): The primary encounter diagnosis was Atrial fibrillation with rapid ventricular response ([address] Diagnoses of Light headedness and Shortness of breath were also pertinent to this visit. has a past medical history of Aneurysm of abdominal aorta ([address] Atrial fibrillation ([address] Cancer ([address] ETD (eustachian tube dysfunction); Facial asymmetries; Fracture, ankle; Gastric ulcer; Hx of cardiac cath; Hx of gastroenteritis; Hyperlipidemia;  Hypertension; Jaw pain, non-TMJ; Mammogram abnormal; Mitral valve prolapse syndrome; and Pregnancy, incidental.   has a past surgical history that includes Colonoscopy (Complete Colonoscopy for Polyp Removal).     Restrictions  Restrictions/Precautions  Restrictions/Precautions: General Precautions, Fall Risk  Required Braces or Orthoses?: No  Position Activity Restriction  Other position/activity restrictions: Up as tolerated, up with assist    Subjective   Pain Assessment  Patient Currently in Pain: Denies     Social/Functional History  Social/Functional History  Lives With: Spouse  Type of Home: House  Home Layout: One level  Home Access: Stairs to enter without rails  Entrance Stairs - Number of Steps: 1 small step  Bathroom Shower/Tub: Walk-in shower  Bathroom Toilet: Handicap height  Bathroom Equipment: Built-in shower seat  Home Equipment:  (none)  ADL Assistance: Independent  Homemaking Assistance: Independent  Homemaking Responsibilities: Yes  Meal Prep Responsibility: Primary  Laundry Responsibility: Primary  Cleaning Responsibility: Primary  Shopping Responsibility: Primary  Ambulation Assistance: Independent  Transfer Assistance: Independent  Active : Yes  Mode of Transportation: Car  Occupation: Part time employment  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  Leisure & Hobbies: gardening     Objective   Vision: Impaired  Vision Exceptions: Wears glasses for reading  Hearing: Within functional limits    Orientation  Overall Orientation Status: Within Functional Limits  Observation/Palpation  Posture: Good  Balance  Sitting Balance: Independent (seated in recliner)  Standing Balance: Contact guard assistance (use of RW)  Standing Balance  Time: 6 min  Activity: sit <> stand transfer, functional mobility into soto  Sit to stand: Contact guard assistance  Stand to sit: Contact guard assistance  Comment: Pt reported dizziness with desaturation to 87%, 2L O2 donned  Functional Mobility  Functional - Mobility Device: Rolling Walker  Activity: Other  Assist Level: Contact guard assistance  ADL  Feeding: Independent  Grooming: Stand by assistance  UE Bathing: Stand by assistance  LE Bathing: Stand Tolerated treatment well  Activity Tolerance: SOB with increased activity, desaturation at 87%, O2 donned  Safety Devices  Safety Devices in place: Yes  Type of devices: Left in chair;Nurse notified;Call light within reach;Gait belt  Restraints  Initially in place: No  OT Equipment Recommendations  Equipment Needed: No         Plan   Plan  Times per week: 4-5x/wk  Current Treatment Recommendations: Functional Mobility Training, Endurance Training, Safety Education & Training, Self-Care / ADL, Patient/Caregiver Education & Training    G-Code  OT G-codes  Functional Assessment Tool Used: Barthel Index  Score: 17/20  Functional Limitation: Self care  Self Care Current Status (): At least 1 percent but less than 20 percent impaired, limited or restricted  Self Care Goal Status (): 0 percent impaired, limited or restricted    Goals  Short term goals  Time Frame for Short term goals: by discharge, pt will  Short term goal 1: demo I in UE ADL activites   Short term goal 2: demo MI in LE ADL activites with AD as needed  Short term goal 3: demo understanding and I use of safe transfer tech during functional activites with LRD  Short term goal 4: demo understanding and I use of EC/WS, fall prevention and proper pursed lipped breathing tech during functional activites   Short term goal 5: demo increased activity tolerance of 20+ min to assist with ADL/ functional activites   Patient Goals   Patient goals : to go home     Therapy Time   Individual Concurrent Group Co-treatment   Time In 0830         Time Out 0857         Minutes 27          See above for LOF. RN reports patient is medically stable for therapy treatment this date. Chart reviewed prior to treatment and patient is agreeable for therapy. All lines intact and patient positioned comfortably at end of treatment. All patient needs addressed prior to ending therapy session.        Co-evaluation with PT  Sudhir Ji, GABBYR/L

## 2017-12-16 LAB
MAGNESIUM: 1.9 MG/DL (ref 1.6–2.6)
POTASSIUM SERPL-SCNC: 4.5 MMOL/L (ref 3.7–5.3)

## 2017-12-16 PROCEDURE — 6370000000 HC RX 637 (ALT 250 FOR IP): Performed by: INTERNAL MEDICINE

## 2017-12-16 PROCEDURE — 2060000000 HC ICU INTERMEDIATE R&B

## 2017-12-16 PROCEDURE — 93005 ELECTROCARDIOGRAM TRACING: CPT

## 2017-12-16 PROCEDURE — 2580000003 HC RX 258: Performed by: STUDENT IN AN ORGANIZED HEALTH CARE EDUCATION/TRAINING PROGRAM

## 2017-12-16 PROCEDURE — 84132 ASSAY OF SERUM POTASSIUM: CPT

## 2017-12-16 PROCEDURE — 6370000000 HC RX 637 (ALT 250 FOR IP): Performed by: STUDENT IN AN ORGANIZED HEALTH CARE EDUCATION/TRAINING PROGRAM

## 2017-12-16 PROCEDURE — 99233 SBSQ HOSP IP/OBS HIGH 50: CPT | Performed by: INTERNAL MEDICINE

## 2017-12-16 PROCEDURE — 94762 N-INVAS EAR/PLS OXIMTRY CONT: CPT

## 2017-12-16 PROCEDURE — 36415 COLL VENOUS BLD VENIPUNCTURE: CPT

## 2017-12-16 PROCEDURE — 83735 ASSAY OF MAGNESIUM: CPT

## 2017-12-16 PROCEDURE — 6370000000 HC RX 637 (ALT 250 FOR IP): Performed by: HOSPITALIST

## 2017-12-16 RX ORDER — FLECAINIDE ACETATE 50 MG/1
50 TABLET ORAL ONCE
Status: COMPLETED | OUTPATIENT
Start: 2017-12-16 | End: 2017-12-16

## 2017-12-16 RX ORDER — FLECAINIDE ACETATE 100 MG/1
100 TABLET ORAL EVERY 12 HOURS SCHEDULED
Qty: 60 TABLET | Refills: 3 | Status: SHIPPED | OUTPATIENT
Start: 2017-12-16 | End: 2017-12-19 | Stop reason: HOSPADM

## 2017-12-16 RX ADMIN — METOPROLOL TARTRATE 25 MG: 25 TABLET ORAL at 08:11

## 2017-12-16 RX ADMIN — FLECAINIDE ACETATE 100 MG: 100 TABLET ORAL at 20:38

## 2017-12-16 RX ADMIN — Medication 10 ML: at 08:13

## 2017-12-16 RX ADMIN — METOPROLOL TARTRATE 25 MG: 25 TABLET ORAL at 20:37

## 2017-12-16 RX ADMIN — DABIGATRAN ETEXILATE MESYLATE 150 MG: 150 CAPSULE ORAL at 08:11

## 2017-12-16 RX ADMIN — OMEPRAZOLE 20 MG: 20 CAPSULE, DELAYED RELEASE ORAL at 08:12

## 2017-12-16 RX ADMIN — LISINOPRIL 10 MG: 10 TABLET ORAL at 20:38

## 2017-12-16 RX ADMIN — DABIGATRAN ETEXILATE MESYLATE 150 MG: 150 CAPSULE ORAL at 20:38

## 2017-12-16 RX ADMIN — FLECAINIDE ACETATE 100 MG: 100 TABLET ORAL at 08:12

## 2017-12-16 RX ADMIN — Medication 10 ML: at 20:38

## 2017-12-16 RX ADMIN — FLECAINIDE ACETATE 50 MG: 50 TABLET ORAL at 15:05

## 2017-12-16 RX ADMIN — METOPROLOL TARTRATE 25 MG: 25 TABLET ORAL at 15:03

## 2017-12-16 RX ADMIN — SIMVASTATIN 40 MG: 40 TABLET, FILM COATED ORAL at 20:37

## 2017-12-16 RX ADMIN — METOPROLOL TARTRATE 25 MG: 25 TABLET ORAL at 09:59

## 2017-12-16 ASSESSMENT — PAIN SCALES - GENERAL
PAINLEVEL_OUTOF10: 0

## 2017-12-16 NOTE — PROGRESS NOTES
[facility]   Progress Note                   Date:   12/16/2017  Patient name: Dara Magana  Date of admission:  12/13/2017  3:48 PM  MRN:   [id number]  YOB: 1945  PCP: Tae Mendez MD    Reason for Admission:  Syncope, Afib with RVR, sinus pauses     Subjective: The patient was seen and examined. This morning, she flipped back on atrial fibrillation/flutter with RVR and felt a jolt sensation in her chest with dizziness. Tolerating Pradaxa. Lopressor dose decreased yesterday to 25 mg and continues on Flecainide 100 BID    Medications:   Scheduled Meds:   lisinopril  10 mg Oral Nightly    metoprolol tartrate  25 mg Oral BID    vancomycin  1,000 mg Intravenous Once    flecainide  100 mg Oral 2 times per day    dabigatran  150 mg Oral BID    omeprazole  20 mg Oral Daily    simvastatin  40 mg Oral Nightly    sodium chloride flush  10 mL Intravenous 2 times per day       Continuous Infusions:     CBC:   Recent Labs      12/13/17   1627   WBC  11.2   HGB  14.2   PLT  218     BMP:    Recent Labs      12/13/17   1627  12/14/17   0406   NA  140  136   K  3.6*  3.4*   CL  100  97*   CO2  26  23   BUN  8  7*   CREATININE  0.75  0.60   GLUCOSE  102*  130*       Troponin:   Recent Labs      12/13/17   1555  12/13/17   1918   TROPONINI  0.00  0.01       INR:   Recent Labs      12/13/17   1627   INR  1.1       Objective:   Vitals: /79   Pulse 110   Temp 98.1 °F (36.7 °C) (Oral)   Resp 16   Ht 5' 4.5\" (1.638 m)   Wt 188 lb 3.2 oz (85.4 kg)   SpO2 96%   BMI 31.81 kg/m²     General appearance: awake, alert, in no apparent respiratory distress.    HEENT: Head: Normocephalic, no lesions, without obvious abnormality  Neck: no JVD  Lungs: clear to auscultation bilaterally, +ve insp basilar rales, no wheezing   Heart: irregular rate and rhythm, S1, S2 normal, no murmur  Abdomen: soft, non-tender; bowel sounds normal  Extremities: No LE edema  Neurologic: Mental status: Alert, oriented. Motor and sensory not done. EK2017 3:53 PM  Atrial fibrillation with RVR, diffuse st t changes suggesting ischemia      17  Atrial Flutter with RVR  IVCD     2017 4:35 am   SR, LAE        Previous cardiac testing:      STRESS 17: No ischemia or infarct. EF 65%.      TTE 16: EF 75%. Mild LVH.      CATH 10/31/11: No significant disease with aneurysmal dilation in mid RCA- EF 55%. Assessment / Acute Cardiac Problems:   1. Syncope, cardiogenic from sick sinus syndrome with longest sinus pause > 5 seconds  2. Atrial fibrillation, Paroxysmal, presented with RVR, back in A fib/ flutter on long term anticoagulation   3. Hypertension   4. GERD       Plan of Treatment:   1. Increase Lopressor to 25 mg q 6 hours. 2. One additional dose of Flecainide now. 3. Continue Flecainide   4. On anticoagulation with pradaxa   5. Holter on discharge. 6. Will ultimately need atrial fibrillation ablation given her symptoms and recurrence. May be discharged home if reverts to sinus rhythm and stays in sinus rhythm and can be discharged with Holter monitor to follow up with Dr. Pati Singer in the clinic. 7. Recommend outpatient PSG. Josemanuel Daniel MD  Fellow, [address]          Attending Cardiologist Addendum: I have reviewed and performed the history, physical, subjective, objective, assessment, and plan with the resident/fellow and agree with the note. I performed the history and physical personally. I have made changes to the note above as needed. Thank you for allowing me to participate in the care of this patient, please do not hesitate to call if you have any questions.     Montrell Wallace MD MSc. [facility]  [phone]

## 2017-12-16 NOTE — PROGRESS NOTES
[address] [place] [address]   Department of Internal Medicine -   Internal Medicine Residency Program  ______________________________________________________________________    Patient: Stacy Sanford  YOB: 1945   MRN: [id number]    Acct: [de-identified]     Admit date: 2017  Today's date: 17    Admitting Diagnosis: Atrial fibrillation with rapid ventricular response (HCC)    Subjective:   Pt seen and Chart reviewed. No acute events overnight  Patient is on 2 L nasal canula. Was in in and out of Afib. No chest pain, nausea and SOB. Fever:-  Temp (24hrs), Av.1 °F (36.7 °C), Min:98 °F (36.7 °C), Max:98.4 °F (36.9 °C)      Blood pressure:   Systolic (37ZPC), NRA:462 , Min:119 , YEC:573     Diastolic (19TKF), VAO:98, Min:51, Max:69      Urine output in the last 24 hours: In: 300 [P.O.:300]  Out: -     Fluids: Not on fluid  Feeding: Eating and drinking  Analgesics: Tylenol 650 mh  DVT Prophylaxis:  Pradaxa  GI Prophylaxis:  None  Glycemic Control: Bowel management: Had BM yesterday   Indwelling catheter: No     Review of systems. · Constitutional: Negative for fever  · Cardiovascular: Negative for lightheadedness/orthostatic symptoms ,chest pain, dyspnea on exertion, palpitations or loss of consciousness. · Respiratory: Negative for cough or wheezing, sputum production, hemoptysis,   · Gastrointestinal: Negative for nausea/vomiting, change in bowel habits, abdominal pain  · Genitourinary:Negative dysuria, trouble voiding, hematuria.   · Neurological: Negative for headache, dizziness,numbness/tingling    Objective:   Vital Sign:  BP (!) 122/51   Pulse 72   Temp 98 °F (36.7 °C) (Oral)   Resp 14   Ht 5' 4.5\" (1.638 m)   Wt 188 lb 3.2 oz (85.4 kg)   SpO2 97%   BMI 31.81 kg/m²       Physical Exam:  General appearance:   alert, well appearing, and in no distress  Mental Status: alert, oriented to person, place, and time  Neurologic:  alert, oriented, normal speech, no focal

## 2017-12-17 LAB
ANION GAP SERPL CALCULATED.3IONS-SCNC: 14 MMOL/L (ref 9–17)
BUN BLDV-MCNC: 18 MG/DL (ref 8–23)
BUN/CREAT BLD: ABNORMAL (ref 9–20)
CALCIUM SERPL-MCNC: 8.9 MG/DL (ref 8.6–10.4)
CHLORIDE BLD-SCNC: 100 MMOL/L (ref 98–107)
CO2: 23 MMOL/L (ref 20–31)
CREAT SERPL-MCNC: 0.75 MG/DL (ref 0.5–0.9)
GFR AFRICAN AMERICAN: >60 ML/MIN
GFR NON-AFRICAN AMERICAN: >60 ML/MIN
GFR SERPL CREATININE-BSD FRML MDRD: ABNORMAL ML/MIN/{1.73_M2}
GFR SERPL CREATININE-BSD FRML MDRD: ABNORMAL ML/MIN/{1.73_M2}
GLUCOSE BLD-MCNC: 105 MG/DL (ref 65–105)
GLUCOSE BLD-MCNC: 145 MG/DL (ref 70–99)
MAGNESIUM: 1.8 MG/DL (ref 1.6–2.6)
POTASSIUM SERPL-SCNC: 4 MMOL/L (ref 3.7–5.3)
SODIUM BLD-SCNC: 137 MMOL/L (ref 135–144)

## 2017-12-17 PROCEDURE — 2060000000 HC ICU INTERMEDIATE R&B

## 2017-12-17 PROCEDURE — 2580000003 HC RX 258: Performed by: INTERNAL MEDICINE

## 2017-12-17 PROCEDURE — 6360000002 HC RX W HCPCS: Performed by: INTERNAL MEDICINE

## 2017-12-17 PROCEDURE — 36415 COLL VENOUS BLD VENIPUNCTURE: CPT

## 2017-12-17 PROCEDURE — 80048 BASIC METABOLIC PNL TOTAL CA: CPT

## 2017-12-17 PROCEDURE — 99233 SBSQ HOSP IP/OBS HIGH 50: CPT | Performed by: INTERNAL MEDICINE

## 2017-12-17 PROCEDURE — 83735 ASSAY OF MAGNESIUM: CPT

## 2017-12-17 PROCEDURE — 6370000000 HC RX 637 (ALT 250 FOR IP): Performed by: STUDENT IN AN ORGANIZED HEALTH CARE EDUCATION/TRAINING PROGRAM

## 2017-12-17 PROCEDURE — 93005 ELECTROCARDIOGRAM TRACING: CPT

## 2017-12-17 PROCEDURE — 94762 N-INVAS EAR/PLS OXIMTRY CONT: CPT

## 2017-12-17 PROCEDURE — 82947 ASSAY GLUCOSE BLOOD QUANT: CPT

## 2017-12-17 PROCEDURE — 2580000003 HC RX 258: Performed by: STUDENT IN AN ORGANIZED HEALTH CARE EDUCATION/TRAINING PROGRAM

## 2017-12-17 PROCEDURE — 6370000000 HC RX 637 (ALT 250 FOR IP): Performed by: INTERNAL MEDICINE

## 2017-12-17 RX ADMIN — METOPROLOL TARTRATE 25 MG: 25 TABLET ORAL at 08:16

## 2017-12-17 RX ADMIN — SIMVASTATIN 40 MG: 40 TABLET, FILM COATED ORAL at 20:44

## 2017-12-17 RX ADMIN — AMIODARONE HYDROCHLORIDE 1 MG/MIN: 50 INJECTION, SOLUTION INTRAVENOUS at 15:34

## 2017-12-17 RX ADMIN — Medication 10 ML: at 08:21

## 2017-12-17 RX ADMIN — OMEPRAZOLE 20 MG: 20 CAPSULE, DELAYED RELEASE ORAL at 08:18

## 2017-12-17 RX ADMIN — AMIODARONE HYDROCHLORIDE 150 MG: 50 INJECTION, SOLUTION INTRAVENOUS at 15:19

## 2017-12-17 RX ADMIN — ENOXAPARIN SODIUM 80 MG: 80 INJECTION SUBCUTANEOUS at 10:12

## 2017-12-17 RX ADMIN — ENOXAPARIN SODIUM 80 MG: 80 INJECTION SUBCUTANEOUS at 20:44

## 2017-12-17 ASSESSMENT — PAIN SCALES - GENERAL: PAINLEVEL_OUTOF10: 0

## 2017-12-17 NOTE — PROGRESS NOTES
[facility]   Progress Note                   Date:   2017  Patient name: Goldie Beasley  Date of admission:  2017  3:48 PM  MRN:   [id number]  YOB: 1945  PCP: Aldair Paul MD    Reason for Admission:  Syncope, Afib with RVR, sinus pauses     Subjective: The patient was seen and examined. She remains in atrial fibrillation. She received 4 doses of Lopressor 25 yesterday in addition to 100 mg BID of Flecainide and an additional dose of 50 mg mid-day. Overnight, she had a 9 second pause on telemetry that the patient describes as 'heart fluttering and electric shooting pain and short of breath\". Medications:   Scheduled Meds:   metoprolol tartrate  25 mg Oral Q6H    lisinopril  10 mg Oral Nightly    vancomycin  1,000 mg Intravenous Once    flecainide  100 mg Oral 2 times per day    dabigatran  150 mg Oral BID    omeprazole  20 mg Oral Daily    simvastatin  40 mg Oral Nightly    sodium chloride flush  10 mL Intravenous 2 times per day       Continuous Infusions:     CBC:   No results for input(s): WBC, HGB, PLT in the last 72 hours. BMP:    Recent Labs      17   1244   K  4.5       Troponin:   No results for input(s): TROPONINI in the last 72 hours. INR:   No results for input(s): INR in the last 72 hours. Objective:   Vitals: /74   Pulse 110   Temp 98.1 °F (36.7 °C) (Oral)   Resp 16   Ht 5' 4.5\" (1.638 m)   Wt 185 lb 9.6 oz (84.2 kg)   SpO2 98%   BMI 31.37 kg/m²     General appearance: awake, alert, in no apparent respiratory distress. HEENT: Head: Normocephalic, no lesions, without obvious abnormality  Neck: no JVD  Lungs: clear to auscultation bilaterally, +ve insp basilar rales, no wheezing   Heart: irregular rate and rhythm, S1, S2 normal, no murmur  Abdomen: soft, non-tender; bowel sounds normal  Extremities: No LE edema  Neurologic: Mental status: Alert, oriented. Motor and sensory not done.      EK2017 3:53

## 2017-12-17 NOTE — PROGRESS NOTES
Pt had pauses/aystole lasting 10.6 secs and 7.6 secs. Pt symptomatic during episode, states could feel \"heart flutter in chest,\" felt lightheaded. Pt currently back in A.fib with RVR. Cardiology paged, awaiting response.

## 2017-12-17 NOTE — PROGRESS NOTES
appearing, and in no distress  Mental Status: alert, oriented to person, place, and time  Neurologic:  alert, oriented, normal speech, no focal findings or movement disorder noted  Lungs:  clear to auscultation, no wheezes, rales or rhonchi, symmetric air entry  Heart[de-identified] normal rate, regular rhythm, normal S1, S2, no murmurs, rubs, clicks or gallops  Abdomen:  soft, nontender, nondistended, no masses or organomegaly  Extremities: peripheral pulses normal, no pedal edema, no clubbing or cyanosis       Medications:   Scheduled Medications   enoxaparin  1 mg/kg Subcutaneous BID    metoprolol tartrate  25 mg Oral Q6H    lisinopril  10 mg Oral Nightly    vancomycin  1,000 mg Intravenous Once    flecainide  100 mg Oral 2 times per day    omeprazole  20 mg Oral Daily    simvastatin  40 mg Oral Nightly    sodium chloride flush  10 mL Intravenous 2 times per day       PRN Medications  sodium chloride flush 10 mL PRN   acetaminophen 650 mg Q4H PRN   magnesium hydroxide 30 mL Daily PRN   ondansetron 4 mg Q6H PRN   potassium chloride 40 mEq PRN   Or     potassium chloride 40 mEq PRN   Or     potassium chloride 10 mEq PRN       Diagnostic Labs and Imaging    CBC:No results for input(s): WBC, HGB, PLT in the last 72 hours. BMP: Recent Labs      12/16/17   1244   K  4.5     Hepatic: No results for input(s): AST, ALT, ALB, BILITOT, ALKPHOS in the last 72 hours. INR: No results for input(s): INR in the last 72 hours. Troponin: No results for input(s): TROPONINI in the last 72 hours. Assessment and Plan:   Principal Problem:    Atrial fibrillation with rapid ventricular response (HCC)  Active Problems:    GERD (gastroesophageal reflux disease)    Hypercholesteremia    Essential hypertension    Hx of cardiac cath in 2011    Fluid overload    Syncope         1. Atrial Fibrillation: Patient had afib with RVR yesterday morning and lopressor was given.    Cardiology changed Lopressor 25 q6 but patient had sinus pauses last night so Lopressor and Flecanide stopped. As per cardiology Hold Praddexa and start Lovenox. 2. Sinus pauses: 3 pauses last night. 7.6 secs, 10.6 secs and 9.4 secs. Was symptomatic and might go for permanent pacemaker.         3. Syncopal episode- CT head normal. Had 3 sinus pauses last night and     4. GERD: On Omeprazole 20 mg .      5. Mild pulmonary hypertension: Home oxygen evaluation at the time of discharge.      6. H/o Smoking : Never been diagnosed with COPD. Will recommend PFT OP. Osmel Patten, PGY-1, Internal Medicine Resident  [facility]      Attending Physician Statement  I have discussed the care of [facility], including pertinent history and exam findings with the resident. I have reviewed the key elements of all parts of the encounter with the resident. I have seen and examined the patient with the resident. I agree with the assessment and plan and status of the problem list as documented. Events noted since yesterday, she had episode of A. fib with RVR yesterday and was seen by cardiology and Lopressor was increased to 25 mg every 6 hours overnight she had extra dose of flecainide later she developed sinus pauses cardiology was aware and they have seen the patient in flecainide and Lopressor Was Stopped when she had tachycardia to 130s this morning and was given Lopressor again and she did have pauses after Lopressor. She denies symptoms of dizziness but last night she does have some dizziness for short period of the time no syncopal or presyncopal episode. She remained without oxygen during daytime yesterday but after she started having episodes last night she was started on oxygen she is on 2 L of nasal cannula and her oxygen saturation is 98% currently. Labs reviewed from this morning and potassium is 4.0 magnesium is 1.8 calcium is 8.9.     She was seen by cardiology and they are discussing likely pacemaker, flecainide was stopped will continue to monitor on telemetry, her Pradaxa was stopped and Lovenox was started, will wait for cardiology attending recommendation.     Discussed with the patient and nursing staff    Kanwal Bradford MD  12/17/2017 11:26 AM

## 2017-12-18 LAB
EKG ATRIAL RATE: 111 BPM
EKG ATRIAL RATE: 57 BPM
EKG ATRIAL RATE: 93 BPM
EKG P AXIS: 57 DEGREES
EKG P-R INTERVAL: 182 MS
EKG Q-T INTERVAL: 324 MS
EKG Q-T INTERVAL: 372 MS
EKG Q-T INTERVAL: 424 MS
EKG QRS DURATION: 102 MS
EKG QRS DURATION: 110 MS
EKG QRS DURATION: 120 MS
EKG QTC CALCULATION (BAZETT): 412 MS
EKG QTC CALCULATION (BAZETT): 413 MS
EKG QTC CALCULATION (BAZETT): 482 MS
EKG R AXIS: 20 DEGREES
EKG R AXIS: 22 DEGREES
EKG R AXIS: 32 DEGREES
EKG T AXIS: 56 DEGREES
EKG T AXIS: 62 DEGREES
EKG T AXIS: 78 DEGREES
EKG VENTRICULAR RATE: 101 BPM
EKG VENTRICULAR RATE: 57 BPM
EKG VENTRICULAR RATE: 98 BPM
HCT VFR BLD CALC: 39 % (ref 36.3–47.1)
HEMOGLOBIN: 12.5 G/DL (ref 11.9–15.1)
MCH RBC QN AUTO: 31.1 PG (ref 25.2–33.5)
MCHC RBC AUTO-ENTMCNC: 32.1 G/DL (ref 28.4–34.8)
MCV RBC AUTO: 97 FL (ref 82.6–102.9)
PARTIAL THROMBOPLASTIN TIME: 25.9 SEC (ref 21.3–31.3)
PDW BLD-RTO: 12.7 % (ref 11.8–14.4)
PLATELET # BLD: 253 K/UL (ref 138–453)
PMV BLD AUTO: 9.3 FL (ref 8.1–13.5)
RBC # BLD: 4.02 M/UL (ref 3.95–5.11)
WBC # BLD: 8.4 K/UL (ref 3.5–11.3)

## 2017-12-18 PROCEDURE — 6370000000 HC RX 637 (ALT 250 FOR IP): Performed by: INTERNAL MEDICINE

## 2017-12-18 PROCEDURE — 97116 GAIT TRAINING THERAPY: CPT

## 2017-12-18 PROCEDURE — 85027 COMPLETE CBC AUTOMATED: CPT

## 2017-12-18 PROCEDURE — 36415 COLL VENOUS BLD VENIPUNCTURE: CPT

## 2017-12-18 PROCEDURE — 2500000003 HC RX 250 WO HCPCS: Performed by: INTERNAL MEDICINE

## 2017-12-18 PROCEDURE — 6360000002 HC RX W HCPCS: Performed by: INTERNAL MEDICINE

## 2017-12-18 PROCEDURE — 85730 THROMBOPLASTIN TIME PARTIAL: CPT

## 2017-12-18 PROCEDURE — 99233 SBSQ HOSP IP/OBS HIGH 50: CPT | Performed by: INTERNAL MEDICINE

## 2017-12-18 PROCEDURE — 76937 US GUIDE VASCULAR ACCESS: CPT

## 2017-12-18 PROCEDURE — 94762 N-INVAS EAR/PLS OXIMTRY CONT: CPT

## 2017-12-18 PROCEDURE — 2580000003 HC RX 258: Performed by: INTERNAL MEDICINE

## 2017-12-18 PROCEDURE — 2060000000 HC ICU INTERMEDIATE R&B

## 2017-12-18 PROCEDURE — 6370000000 HC RX 637 (ALT 250 FOR IP): Performed by: STUDENT IN AN ORGANIZED HEALTH CARE EDUCATION/TRAINING PROGRAM

## 2017-12-18 PROCEDURE — 97110 THERAPEUTIC EXERCISES: CPT

## 2017-12-18 RX ORDER — HEPARIN SODIUM 1000 [USP'U]/ML
4000 INJECTION, SOLUTION INTRAVENOUS; SUBCUTANEOUS PRN
Status: DISCONTINUED | OUTPATIENT
Start: 2017-12-18 | End: 2017-12-18

## 2017-12-18 RX ORDER — HEPARIN SODIUM 10000 [USP'U]/100ML
1000 INJECTION, SOLUTION INTRAVENOUS CONTINUOUS
Status: DISCONTINUED | OUTPATIENT
Start: 2017-12-18 | End: 2017-12-18

## 2017-12-18 RX ORDER — DABIGATRAN ETEXILATE 150 MG/1
150 CAPSULE, COATED PELLETS ORAL EVERY 12 HOURS SCHEDULED
Status: DISCONTINUED | OUTPATIENT
Start: 2017-12-18 | End: 2017-12-19 | Stop reason: HOSPADM

## 2017-12-18 RX ORDER — HEPARIN SODIUM 1000 [USP'U]/ML
4000 INJECTION, SOLUTION INTRAVENOUS; SUBCUTANEOUS ONCE
Status: DISCONTINUED | OUTPATIENT
Start: 2017-12-18 | End: 2017-12-18

## 2017-12-18 RX ORDER — HEPARIN SODIUM 1000 [USP'U]/ML
2000 INJECTION, SOLUTION INTRAVENOUS; SUBCUTANEOUS PRN
Status: DISCONTINUED | OUTPATIENT
Start: 2017-12-18 | End: 2017-12-18

## 2017-12-18 RX ORDER — DABIGATRAN ETEXILATE 150 MG/1
150 CAPSULE, COATED PELLETS ORAL 2 TIMES DAILY
Status: DISCONTINUED | OUTPATIENT
Start: 2017-12-18 | End: 2017-12-18

## 2017-12-18 RX ORDER — VERAPAMIL HYDROCHLORIDE 2.5 MG/ML
2.5 INJECTION, SOLUTION INTRAVENOUS ONCE
Status: COMPLETED | OUTPATIENT
Start: 2017-12-19 | End: 2017-12-18

## 2017-12-18 RX ADMIN — OMEPRAZOLE 20 MG: 20 CAPSULE, DELAYED RELEASE ORAL at 08:05

## 2017-12-18 RX ADMIN — DEXTROSE 0.5 MG/MIN: 5 SOLUTION INTRAVENOUS at 20:21

## 2017-12-18 RX ADMIN — DABIGATRAN ETEXILATE MESYLATE 150 MG: 150 CAPSULE ORAL at 20:21

## 2017-12-18 RX ADMIN — DEXTROSE 0.5 MG/MIN: 5 SOLUTION INTRAVENOUS at 03:40

## 2017-12-18 RX ADMIN — VERAPAMIL HYDROCHLORIDE 2.5 MG: 2.5 INJECTION, SOLUTION INTRAVENOUS at 23:55

## 2017-12-18 RX ADMIN — SIMVASTATIN 40 MG: 40 TABLET, FILM COATED ORAL at 20:20

## 2017-12-18 ASSESSMENT — PAIN SCALES - GENERAL
PAINLEVEL_OUTOF10: 0

## 2017-12-18 NOTE — PROGRESS NOTES
Pharmacy Medication Counseling Note    Dabigatran counseling provided to [place] Connelly provided to patient include: dabigatran patient information    Counseling points included:  1. Indication for dabigatran: Atrial Fibrillation  2. Explanation of dabigatran (blood thinner)  3. Dose and directions, including missed doses and timing of medication  4. Storage considerations  5. Side effects: bleeding/bruising and dyspepsia  6. Potential drug interactions:   A. Quinidine, amiodarone, verapamil, P-gp inducers (rifampin) and inhibitors                         (dronedarone, ketoconazole)  7. Signs and symptoms of VTE and stroke reviewed  8. Contact prescriber when:   A. Changes made to other medications   B. Signs or symptoms of VTE or stroke   C. Uncontrolled bleeding or unusual bruising    Answered all medication-related questions and patient verbalized understanding.   Kristofer Landis, Pharm D.  12/18/2017  3:18 PM

## 2017-12-18 NOTE — PROGRESS NOTES
[facility]   Progress Note                   Date:   2017  Patient name: Goldie Beasley  Date of admission:  2017  3:48 PM  MRN:   [id number]  YOB: 1945  PCP: Aldair Paul MD    Reason for Admission:  Syncope, Afib with RVR, sinus pauses     Subjective: The patient was seen and examined. Remains in sinus rhythm overnight, no further pauses (last one at 4 pm yesterday), denies chest pain, ambulating in hallway without any dizziness. On iv amio @ 0.5 mg/min    Medications:   Scheduled Meds:   dabigatran  150 mg Oral BID    vancomycin  1,000 mg Intravenous Once    omeprazole  20 mg Oral Daily    simvastatin  40 mg Oral Nightly    sodium chloride flush  10 mL Intravenous 2 times per day       Continuous Infusions:   amiodarone 450mg/250ml D5W infusion 0.5 mg/min (17 0340)       CBC:   Recent Labs      17   1245   WBC  8.4   HGB  12.5   PLT  253     BMP:    Recent Labs      17   1244  17   0908   NA   --   137   K  4.5  4.0   CL   --   100   CO2   --   23   BUN   --   18   CREATININE   --   0.75   GLUCOSE   --   145*       Troponin:   No results for input(s): TROPONINI in the last 72 hours. INR:   No results for input(s): INR in the last 72 hours. Objective:   Vitals: /61   Pulse 59   Temp 97.7 °F (36.5 °C) (Oral)   Resp 18   Ht 5' 4.5\" (1.638 m)   Wt 185 lb 9.6 oz (84.2 kg)   SpO2 95%   BMI 31.37 kg/m²     General appearance: awake, alert, in no apparent respiratory distress. On 2 l nasal cannula   HEENT: Head: Normocephalic, no lesions, without obvious abnormality  Neck: no JVD  Lungs: clear to auscultation bilaterally, +ve insp basilar rales, no wheezing   Heart: irregular rate and rhythm, S1, S2 normal, no murmur  Abdomen: soft, non-tender; bowel sounds normal  Extremities: No LE edema  Neurologic: Mental status: Alert, oriented. Motor and sensory not done.      EK2017 3:53 PM  Atrial fibrillation with RVR, [facility]  [phone]

## 2017-12-18 NOTE — PROGRESS NOTES
[address] [place] [address]     Department of Internal Medicine - Staff Internal Medicine Service     DAILY PROGRESS NOTE  TEAM       Patient: Lashaun Flynn  YOB: 1945  MRN: [id number]     Acct: [de-identified]     Admit date: 12/13/2017  Admitting Diagnosis: Atrial fibrillation with rapid ventricular response (HCC)    Subjective:   Patient is seen and examined. No acute events overnight. Patient was in sinus rhythm overnight. Was hypotensive last night but was asymptomatic. Intake/Output Summary (Last 24 hours) at 12/18/17 0647  Last data filed at 12/18/17 0634   Gross per 24 hour   Intake          1048.88 ml   Output              500 ml   Net           548.88 ml         REVIEW OF SYSTEMS:  · Constitutional: Negative for Fever, chills  · Eyes: Negative for visual changes, diplopia  · ENT: Negative for mouth sores, sore throat. · Cardiovascular: Negative for lightheadedness ,chest pain, palpitations   · Respiratory:Negative for Shortness of breath,cough or wheezing. · Gastrointestinal: Negative for nausea/vomiting, change in bowel habits, abdominal pain  · Genitourinary:Negative for change in bladder habits, dysuria, hematuria. · Musculoskeletal: Negative for joint pain   · Neurological: Negative for headache, change in muscle strength numbness/tingling  · Psychiatric: negative for change in mood, affect  Objective:   BP (!) 110/54   Pulse 59   Temp 97.5 °F (36.4 °C) (Oral)   Resp 19   Ht 5' 4.5\" (1.638 m)   Wt 185 lb 9.6 oz (84.2 kg)   SpO2 99%   BMI 31.37 kg/m²       Intake/Output Summary (Last 24 hours) at 12/18/17 0647  Last data filed at 12/18/17 9814   Gross per 24 hour   Intake          1048.88 ml   Output              500 ml   Net           548.88 ml       · General appearance: awake, alert, cooperative  · HEENT: Head: Normocephalic, no lesions, without obvious abnormality.   · Lungs: clear to auscultation bilaterally  · Heart: regular rate and rhythm, S1, S2 the time of discharge.      6. H/o Smoking : Never been diagnosed with COPD. Will recommend PFT OP.      DVT prophylaxis:  Lovenox hold   Diet: NPO         Barbie Styles, PGY-1, Internal Medicine Resident  [facility]    Attending Physician Statement  I have discussed the care of [facility], including pertinent history and exam findings with the resident. I have reviewed the key elements of all parts of the encounter with the resident. I have seen and examined the patient with the resident. I agree with the assessment and plan and status of the problem list as documented. He was noted since yesterday, she did not have any further episodes of itchy fibrillation with RVR. She is continued on amiodarone drip and Lopressor and flecainide was stopped, later since last night/evening she did not have any more pauses and she was converted to sinus rhythm this morning she was in sinus bradycardia with heart rate of 60. She denies dizziness syncope she is ambulating she is currently on O2 2 L and oxygen saturation 99% denies any cough wheezing and does not have any crackles on exam.  She was seen by cardiology she is currently on nothing by mouth this morning and the plan was possibly ablation for A. fib versus pacemaker in case she has any more pauses currently on Lovenox is on hold because of possible procedure. We will wait for final cardiology recommendation and resume Lovenox or heparin drip and will DC and. There is no plan for procedure today.       Manpreet Chavis MD  12/18/2017 1:37 PM

## 2017-12-18 NOTE — PLAN OF CARE
Problem: SAFETY  Goal: Free from accidental physical injury  Outcome: Ongoing      Problem: Falls - Risk of  Goal: Absence of falls  Outcome: Ongoing      Problem: Musculor/Skeletal Functional Status  Goal: Highest potential functional level  Outcome: Ongoing

## 2017-12-18 NOTE — PROGRESS NOTES
mobility  Supine to Sit: Unable to assess  Comment: pt seated in chair upon arrival and returned to chair after amb  Transfers  Sit to Stand: Contact guard assistance  Stand to sit: Stand by assistance  Comment: good safety awareness  Ambulation  Ambulation?: Yes  Ambulation 1  Surface: level tile  Device: Rolling Walker  Other Apparatus:  (on RA with SaO2 WNL t/o amb)  Assistance: Contact guard assistance  Quality of Gait: steady, good leslie but decreasing with distance. Distance: 100' x1, 200' x1  Comments: 1 seated rest break approx 30secs between ambs, no c/o dizziness  Stairs/Curb  Stairs?: No     Balance  Posture: Good  Sitting - Static: Good  Sitting - Dynamic: Good  Standing - Static: -  Standing - Dynamic: -  Comments: RW standing balance  Exercises  Seated LE exercise program: Long Arc Quads, hip abduction/adduction, heel/toe raises, and marches. Reps: 20x with 2# weight  Upper extremity exercises: Bicep curl, shoulder flexion/extension, punches, tricep curl, shoulder abduction/adduction. Reps: 10x with yellow theraband  Comments:  Good effort, issued yellow theraband for home use        Assessment   Body structures, Functions, Activity limitations: Decreased functional mobility ; Decreased balance;Decreased endurance  Assessment: amb 100' and 200' RW CGA. Pt limited by endurance but improving with PT. No dizziness reported this date, pt demo's good saafety awareness with mobility  Prognosis: Good  Patient Education: importance of amb, use of RW,   REQUIRES PT FOLLOW UP: Yes  Activity Tolerance  Activity Tolerance: Patient Tolerated treatment well; Other  Activity Tolerance: improved tolerance to amb, good effort  PT Equipment Recommendations  Other: TBD, likely RW        Goals  Short term goals  Time Frame for Short term goals: 14 visits  Short term goal 1: Pt will be I with bed mobility  Short term goal 2: Pt will be I with transfers  Short term goal 3: Pt will be Sree with amb 300' RW  Short term goal

## 2017-12-19 VITALS
HEART RATE: 60 BPM | TEMPERATURE: 98.1 F | WEIGHT: 185.6 LBS | BODY MASS INDEX: 30.92 KG/M2 | OXYGEN SATURATION: 97 % | HEIGHT: 65 IN | DIASTOLIC BLOOD PRESSURE: 88 MMHG | RESPIRATION RATE: 18 BRPM | SYSTOLIC BLOOD PRESSURE: 116 MMHG

## 2017-12-19 PROCEDURE — 99233 SBSQ HOSP IP/OBS HIGH 50: CPT | Performed by: INTERNAL MEDICINE

## 2017-12-19 PROCEDURE — 2500000003 HC RX 250 WO HCPCS

## 2017-12-19 PROCEDURE — 6370000000 HC RX 637 (ALT 250 FOR IP): Performed by: STUDENT IN AN ORGANIZED HEALTH CARE EDUCATION/TRAINING PROGRAM

## 2017-12-19 PROCEDURE — 2580000003 HC RX 258: Performed by: STUDENT IN AN ORGANIZED HEALTH CARE EDUCATION/TRAINING PROGRAM

## 2017-12-19 PROCEDURE — 6370000000 HC RX 637 (ALT 250 FOR IP): Performed by: INTERNAL MEDICINE

## 2017-12-19 PROCEDURE — 97110 THERAPEUTIC EXERCISES: CPT

## 2017-12-19 PROCEDURE — 94762 N-INVAS EAR/PLS OXIMTRY CONT: CPT

## 2017-12-19 PROCEDURE — 94620 HC 6-MINUTE WALK TEST/PULM STRESS TEST SIMPLE: CPT

## 2017-12-19 RX ORDER — METOPROLOL TARTRATE 5 MG/5ML
INJECTION INTRAVENOUS
Status: COMPLETED
Start: 2017-12-19 | End: 2017-12-19

## 2017-12-19 RX ORDER — AMIODARONE HYDROCHLORIDE 200 MG/1
400 TABLET ORAL 2 TIMES DAILY
Status: DISCONTINUED | OUTPATIENT
Start: 2017-12-19 | End: 2017-12-19

## 2017-12-19 RX ORDER — AMIODARONE HYDROCHLORIDE 400 MG/1
400 TABLET ORAL 3 TIMES DAILY
Qty: 30 TABLET | Refills: 3 | Status: SHIPPED | OUTPATIENT
Start: 2017-12-19 | End: 2018-01-11 | Stop reason: ALTCHOICE

## 2017-12-19 RX ORDER — AMIODARONE HYDROCHLORIDE 200 MG/1
200 TABLET ORAL 2 TIMES DAILY
Status: DISCONTINUED | OUTPATIENT
Start: 2017-12-24 | End: 2017-12-19

## 2017-12-19 RX ORDER — AMIODARONE HYDROCHLORIDE 200 MG/1
400 TABLET ORAL 3 TIMES DAILY
Status: DISCONTINUED | OUTPATIENT
Start: 2017-12-19 | End: 2017-12-19 | Stop reason: HOSPADM

## 2017-12-19 RX ORDER — AMIODARONE HYDROCHLORIDE 400 MG/1
400 TABLET ORAL 2 TIMES DAILY
Qty: 30 TABLET | Refills: 3 | Status: SHIPPED | OUTPATIENT
Start: 2017-12-21 | End: 2018-01-11 | Stop reason: ALTCHOICE

## 2017-12-19 RX ORDER — AMIODARONE HYDROCHLORIDE 200 MG/1
200 TABLET ORAL 2 TIMES DAILY
Qty: 30 TABLET | Refills: 3 | Status: ON HOLD | OUTPATIENT
Start: 2017-12-26 | End: 2018-01-13

## 2017-12-19 RX ORDER — METOPROLOL TARTRATE 5 MG/5ML
5 INJECTION INTRAVENOUS ONCE
Status: COMPLETED | OUTPATIENT
Start: 2017-12-19 | End: 2017-12-19

## 2017-12-19 RX ORDER — AMIODARONE HYDROCHLORIDE 200 MG/1
400 TABLET ORAL 2 TIMES DAILY
Status: DISCONTINUED | OUTPATIENT
Start: 2017-12-21 | End: 2017-12-19 | Stop reason: HOSPADM

## 2017-12-19 RX ORDER — AMIODARONE HYDROCHLORIDE 200 MG/1
200 TABLET ORAL 2 TIMES DAILY
Status: DISCONTINUED | OUTPATIENT
Start: 2017-12-26 | End: 2017-12-19 | Stop reason: HOSPADM

## 2017-12-19 RX ADMIN — METOPROLOL TARTRATE 5 MG: 5 INJECTION INTRAVENOUS at 08:44

## 2017-12-19 RX ADMIN — OMEPRAZOLE 20 MG: 20 CAPSULE, DELAYED RELEASE ORAL at 08:45

## 2017-12-19 RX ADMIN — DABIGATRAN ETEXILATE MESYLATE 150 MG: 150 CAPSULE ORAL at 08:45

## 2017-12-19 RX ADMIN — AMIODARONE HYDROCHLORIDE 400 MG: 200 TABLET ORAL at 12:44

## 2017-12-19 RX ADMIN — Medication 10 ML: at 08:45

## 2017-12-19 ASSESSMENT — PAIN SCALES - GENERAL: PAINLEVEL_OUTOF10: 0

## 2017-12-19 NOTE — PROGRESS NOTES
irregularly irregular rhythm, S1, S2 normal, no murmur  · Abdomen: soft, non-tender; bowel sounds normal; no masses,  no organomegaly  · Extremities: extremities normal, atraumatic, no cyanosis or edema  · Neurological:  Awake, alert, oriented to name, place and time. Cranial nerves II-XII are grossly intact. Reflexes normal and symmetric. Sensation grossly normal  · Eye no icterus no redness  · Psych-normal affect           Medications:   MEDICATIONS:  Prior to Admission medications    Medication Sig Start Date End Date Taking? Authorizing Provider   amiodarone (PACERONE) 400 MG tablet Take 1 tablet by mouth 3 times daily 12/19/17  Yes Sherrill Mason MD   amiodarone (PACERONE) 400 MG tablet Take 1 tablet by mouth 2 times daily 12/21/17  Yes Sherrill Mason MD   amiodarone (CORDARONE) 200 MG tablet Take 1 tablet by mouth 2 times daily 12/26/17  Yes Sherrill Mason MD   simvastatin (ZOCOR) 40 MG tablet TAKE 1 TABLET AT BEDTIME 11/3/17  Yes Bravo De La Cruz MD   lisinopril (PRINIVIL;ZESTRIL) 10 MG tablet Take 1 tablet by mouth daily 3/7/17  Yes Bravo De La Cruz MD   dabigatran (PRADAXA) 150 MG capsule Take 1 capsule by mouth 2 times daily 6/23/16  Yes Angelica Garay MD   omeprazole (PRILOSEC OTC) 20 MG tablet Take 1 tablet by mouth daily 11/3/15  Yes Bravo De La Cruz MD   aspirin 81 MG tablet Take 81 mg by mouth daily.    Yes Historical Provider, MD     Scheduled Meds:   amiodarone  400 mg Oral TID    Followed by   Charlee Edmond ON 12/21/2017] amiodarone  400 mg Oral BID    Followed by   Charlee Edmond ON 12/26/2017] amiodarone  200 mg Oral BID    dabigatran  150 mg Oral 2 times per day    vancomycin  1,000 mg Intravenous Once    omeprazole  20 mg Oral Daily    simvastatin  40 mg Oral Nightly    sodium chloride flush  10 mL Intravenous 2 times per day     Continuous Infusions:     PRN Meds:     sodium chloride flush 10 mL PRN   acetaminophen 650 mg Q4H PRN   magnesium hydroxide 30 mL Daily PRN   ondansetron 4 mg Q6H PRN Lopressor was stopped. Home O2 evaluation was done on room air and on walking her saturation was 93% after walking and there was no exercise-induced hypoxemia and did not qualify for home O2, she does have long history of smoking once her acute cardiac issue resolved she should get pulmonary function test and that is discussed with the patient. She was continued on Pradaxa and she will hold Pradaxa one day before her ablation which is scheduled on January 11 by cardiology.       Keyana Valerio MD  12/19/2017 2:13 PM

## 2017-12-19 NOTE — CARE COORDINATION
Me with patient to discuss discharge planing. Patient does not qualify for home oxygen. Patient is declining home care needs at this time. Patient states she was on Pradaxa prior to admission    1511  Left  for Kaylin Cool at [facility] [place] regarding oxygen tank that was delivered to patient. Patient did not qualify for O2 upon DC.   Tank left at nurse's station for pickup

## 2017-12-19 NOTE — FLOWSHEET NOTE
Visit:  visited to offer [address]. Patient was sitting in chair and patient's family was present. Assessment: Patient seemed unhappy. Patient stated that she did want to receive Communion. Family stated that patient is stubborn and that her belief that she should go home is \"wishful thinking. \"    Intervention:  offered Communion, prayer, blessing and a listening and supportive presence. Patient stated that she wants to go home. \"I have been here a for a week. \"  She agreed that she is frustrated with being here and the health issues she is experiencing that are keeping her here. Plan: Chaplains to remain available for further support if needed. 12/19/17 1011   Encounter Summary   Services provided to: Patient and family together   Referral/Consult From: Awilda Coleman Visiting (12/19/17)   Complexity of Encounter Low   Length of Encounter 15 minutes   Spiritual Assessment Completed Yes   Routine   Type Follow up   Intervention [place]   Spiritual/Anabaptism   Type Spiritual support   Assessment Calm; Approachable   Intervention Active listening;Explored feelings, thoughts, concerns;Nurtured hope;Prayer;Communion   Outcome Expressed gratitude;Engaged in conversation;Expressed feelings/needs/concerns;Coping

## 2017-12-19 NOTE — DISCHARGE INSTR - OTHER ORDERS
Come back to the main floor on 1/11/17 @ 8am for planned ablation. Do not drink or eat anything after midnight on 1/10/17.

## 2017-12-19 NOTE — DISCHARGE INSTR - DIET

## 2017-12-19 NOTE — PROGRESS NOTES
Currently in Pain: Denies  Vital Signs  Patient Currently in Pain: Denies       Orientation  Orientation  Overall Orientation Status: Within Normal Limits  Objective      Transfers  Sit to Stand: Unable to assess (Exercise only this date due to rhythm)  Ambulation  Ambulation?: No   Exercise  Seated LE exercise program: Long Arc Quads, hip abduction/adduction, heel/toe raises, and marches. Reps: 15x  Quad set, glut set, HS set x 15  Bicep curls x 15  Pt then states \"i'm done now\"                              Assessment   Body structures, Functions, Activity limitations: Decreased functional mobility ; Decreased balance;Decreased endurance  Assessment: Treatment limited this date due to HR and rhythm. HR 44-58 wtih seated ex.  Pt anxious, but asymptomatic  Prognosis: Good  REQUIRES PT FOLLOW UP: Yes  Activity Tolerance  Activity Tolerance: Treatment limited secondary to medical complications (free text)                                                    Goals  Short term goals  Time Frame for Short term goals: 14 visits  Short term goal 1: Pt will be I with bed mobility  Short term goal 2: Pt will be I with transfers  Short term goal 3: Pt will be Sree with amb 300' RW  Short term goal 4: Pt will navigate 3 steps Sree without rail    Plan    Plan  Times per week: 5-6x/wk  Times per day: Daily  Current Treatment Recommendations: Strengthening, Transfer Training, Endurance Training, Balance Training, Gait Training, Functional Mobility Training, Stair training, Home Exercise Program, Safety Education & Training, Equipment Evaluation, Education, & procurement, Patient/Caregiver Education & Training  Safety Devices  Type of devices: Call light within reach, Patient at risk for falls, Nurse notified, Gait belt, All fall risk precautions in place, Left in chair  Restraints  Initially in place: No     Therapy Time   Individual Concurrent Group Co-treatment   Time In 7955         Time Out 0928         Minutes 10 Irma Orona, PTA

## 2017-12-19 NOTE — PROGRESS NOTES
Home Oxygen Evaluation    Home Oxygen Evaluation completed. Patient is on room air upon my arrival.  Resting SpO2 on room air = 96%    SpO2 on room air with exercise = 93%  SpO2 on oxygen as above with exercise = n/a    Nocturnal Oximetry with patient on room air is recommended is SpO2 is between 89% and 95% (requires additional order).     Canelo Meza  11:44 AM

## 2017-12-19 NOTE — PLAN OF CARE
Problem: SAFETY  Goal: Free from accidental physical injury  Outcome: Ongoing      Problem: KNOWLEDGE DEFICIT  Goal: Patient/S.O. demonstrates understanding of disease process, treatment plan, medications, and discharge instructions.   Outcome: Ongoing      Problem: Falls - Risk of  Goal: Absence of falls  Outcome: Ongoing

## 2017-12-19 NOTE — FLOWSHEET NOTE
Perfect Serve sent to Dr. Katya Ocampo with cardiology to clarify orders for Amio gtt as the patient remains in A.fib RVR. Vitals stable and patient remains asymptomatic. Awaiting response.

## 2018-01-02 NOTE — DISCHARGE SUMMARY
[address] [place] [address]     Department of Internal Medicine - Staff Internal Medicine Service    INPATIENT DISCHARGE SUMMARY      PATIENT IDENTIFICATION:  NAME:  Josué Lewis   :   1945  MRN:    [id number]     Acct:    [de-identified]   Admit Date:  2017  Discharge date:  2017 12:56 PM   Attending Provider: No att. providers found                                     REASON FOR HOSPITALIZATION:   Josué Lewis is a 74 y.o. female who presented with     DIAGNOSES:  Primary:   Atrial fibrillation with rapid ventricular response ([address] [I48.91]    Secondary: Active Hospital Problems    Diagnosis Date Noted    Atrial fibrillation with rapid ventricular response ([address] [I48.91] 2017    Hx of cardiac cath in  [Z98.890] 2017    Fluid overload [E87.70] 2017    Syncope [R55] 2017    Essential hypertension [I10] 2015    Hypercholesteremia [E78.00] 2015    GERD (gastroesophageal reflux disease) [K21.9] 2015       TREATMENT:  Brief Inpatient Course: The patient is a pleasant 74 y.o. female with significant past medical history of  Atrial fibrillation presented with c/o shortness of breath since last 3-4 weeks. As per patient her shortness of breath is progressively getting worse and since last 2 days she was getting SOB after small distances. Also c/o chest pressure like symptoms. Patient was also complaining of palpitations and electrical shock like symptoms in the chest. Yesterday morning she had one syncopal episode and she fell down. She did not loose consciousness but hit her head. CT head was negative for stroke. Patient was on in Afib and was on lopressor and predaxa for [facility]. During hospital admission she had 3 sinus pauses and longest being 11 secs. Initially patient was started on Amiodarone and heparin   and then Discontinued. Pradexa was restarted.    Cardiology decided to do OP Afib ablation and discharged patient on Flecainide and Lopressor. Consults:   cardiology    Procedures:  None     Any Hospital Acquired Infections:  None     PATIENT'S DISCHARGE CONDITION:      PATIENT/FAMILY INSTRUCTIONS:   Discharge Medication List as of 12/19/2017 12:32 PM      START taking these medications    Details   !! amiodarone (PACERONE) 400 MG tablet Take 1 tablet by mouth 3 times daily, Disp-30 tablet, R-3Normal      !! amiodarone (PACERONE) 400 MG tablet Take 1 tablet by mouth 2 times daily, Disp-30 tablet, R-3Normal      !! amiodarone (CORDARONE) 200 MG tablet Take 1 tablet by mouth 2 times daily, Disp-30 tablet, R-3Normal       !! - Potential duplicate medications found. Please discuss with provider. CONTINUE these medications which have NOT CHANGED    Details   simvastatin (ZOCOR) 40 MG tablet TAKE 1 TABLET AT BEDTIME, Disp-90 tablet, R-2Normal      lisinopril (PRINIVIL;ZESTRIL) 10 MG tablet Take 1 tablet by mouth daily, Disp-90 tablet, R-3Normal      dabigatran (PRADAXA) 150 MG capsule Take 1 capsule by mouth 2 times daily, Disp-60 capsule, R-3      omeprazole (PRILOSEC OTC) 20 MG tablet Take 1 tablet by mouth daily, Disp-90 tablet, R-1      aspirin 81 MG tablet Take 81 mg by mouth daily.          STOP taking these medications       flecainide (TAMBOCOR) 100 MG tablet Comments:   Reason for Stopping:         metoprolol tartrate (LOPRESSOR) 25 MG tablet Comments:   Reason for Stopping:             Activity: activity as tolerated    Diet: regular diet    Disposition: home    Follow-up in 7 days  with MD Bravo Cyr MD  [address] [place] [address]  [address] [facility]  [address] [facility] [address] [phone]    Call in 1 week        Follow up labs:  None     Follow up imaging: None       Norberto Hawley MD  [address] [facility], [facility]  PGY-1

## 2018-01-11 ENCOUNTER — HOSPITAL ENCOUNTER (INPATIENT)
Dept: CARDIAC CATH/INVASIVE PROCEDURES | Age: 73
LOS: 1 days | Discharge: HOME | DRG: 273 | End: 2018-01-13
Attending: INTERNAL MEDICINE | Admitting: INTERNAL MEDICINE
Payer: MEDICARE

## 2018-01-11 ENCOUNTER — ANESTHESIA (OUTPATIENT)
Dept: CARDIAC CATH/INVASIVE PROCEDURES | Age: 73
End: 2018-01-11

## 2018-01-11 ENCOUNTER — ANESTHESIA EVENT (OUTPATIENT)
Dept: CARDIAC CATH/INVASIVE PROCEDURES | Age: 73
End: 2018-01-11

## 2018-01-11 VITALS — OXYGEN SATURATION: 99 % | SYSTOLIC BLOOD PRESSURE: 88 MMHG | DIASTOLIC BLOOD PRESSURE: 42 MMHG | TEMPERATURE: 94.2 F

## 2018-01-11 DIAGNOSIS — I48.0 PAROXYSMAL ATRIAL FIBRILLATION (HCC): ICD-10-CM

## 2018-01-11 LAB
ACTIVATED CLOTTING TIME: 269 SEC (ref 79–149)
ACTIVATED CLOTTING TIME: 351 SEC (ref 79–149)
ACTIVATED CLOTTING TIME: 363 SEC (ref 79–149)
ACTIVATED CLOTTING TIME: 367 SEC (ref 79–149)
ACTIVATED CLOTTING TIME: 371 SEC (ref 79–149)
GLUCOSE BLD-MCNC: 88 MG/DL (ref 74–100)
LV EF: 55 %
LV EF: 55 %
LVEF MODALITY: NORMAL
LVEF MODALITY: NORMAL
POC CHLORIDE: 105 MMOL/L (ref 98–107)
POC HEMATOCRIT: 43 % (ref 36–46)
POC HEMOGLOBIN: 14.8 G/DL (ref 12–16)
POC POTASSIUM: 4.3 MMOL/L (ref 3.5–4.5)
POC SODIUM: 138 MMOL/L (ref 138–146)

## 2018-01-11 PROCEDURE — C1894 INTRO/SHEATH, NON-LASER: HCPCS

## 2018-01-11 PROCEDURE — 6360000002 HC RX W HCPCS

## 2018-01-11 PROCEDURE — 2720000000 HC MISC SURG SUPPLY STERILE $0-50

## 2018-01-11 PROCEDURE — 4A023FZ MEASUREMENT OF CARDIAC RHYTHM, PERCUTANEOUS APPROACH: ICD-10-PCS | Performed by: INTERNAL MEDICINE

## 2018-01-11 PROCEDURE — 93308 TTE F-UP OR LMTD: CPT

## 2018-01-11 PROCEDURE — C1759 CATH, INTRA ECHOCARDIOGRAPHY: HCPCS

## 2018-01-11 PROCEDURE — G0378 HOSPITAL OBSERVATION PER HR: HCPCS

## 2018-01-11 PROCEDURE — 93656 COMPRE EP EVAL ABLTJ ATR FIB: CPT

## 2018-01-11 PROCEDURE — 7100000010 HC PHASE II RECOVERY - FIRST 15 MIN

## 2018-01-11 PROCEDURE — 84295 ASSAY OF SERUM SODIUM: CPT

## 2018-01-11 PROCEDURE — 3700000000 HC ANESTHESIA ATTENDED CARE

## 2018-01-11 PROCEDURE — 93325 DOPPLER ECHO COLOR FLOW MAPG: CPT

## 2018-01-11 PROCEDURE — C1730 CATH, EP, 19 OR FEW ELECT: HCPCS

## 2018-01-11 PROCEDURE — 93312 ECHO TRANSESOPHAGEAL: CPT

## 2018-01-11 PROCEDURE — 2720000010 HC SURG SUPPLY STERILE

## 2018-01-11 PROCEDURE — 6370000000 HC RX 637 (ALT 250 FOR IP): Performed by: INTERNAL MEDICINE

## 2018-01-11 PROCEDURE — 2580000003 HC RX 258: Performed by: INTERNAL MEDICINE

## 2018-01-11 PROCEDURE — 93613 INTRACARDIAC EPHYS 3D MAPG: CPT

## 2018-01-11 PROCEDURE — 7100000011 HC PHASE II RECOVERY - ADDTL 15 MIN

## 2018-01-11 PROCEDURE — 93662 INTRACARDIAC ECG (ICE): CPT

## 2018-01-11 PROCEDURE — 2580000003 HC RX 258: Performed by: NURSE ANESTHETIST, CERTIFIED REGISTERED

## 2018-01-11 PROCEDURE — 93005 ELECTROCARDIOGRAM TRACING: CPT

## 2018-01-11 PROCEDURE — 02583ZZ DESTRUCTION OF CONDUCTION MECHANISM, PERCUTANEOUS APPROACH: ICD-10-PCS | Performed by: INTERNAL MEDICINE

## 2018-01-11 PROCEDURE — C1769 GUIDE WIRE: HCPCS

## 2018-01-11 PROCEDURE — 6370000000 HC RX 637 (ALT 250 FOR IP)

## 2018-01-11 PROCEDURE — 93657 TX L/R ATRIAL FIB ADDL: CPT

## 2018-01-11 PROCEDURE — C1731 CATH, EP, 20 OR MORE ELEC: HCPCS

## 2018-01-11 PROCEDURE — 82565 ASSAY OF CREATININE: CPT

## 2018-01-11 PROCEDURE — B246ZZ3 ULTRASONOGRAPHY OF RIGHT AND LEFT HEART, INTRAVASCULAR: ICD-10-PCS | Performed by: INTERNAL MEDICINE

## 2018-01-11 PROCEDURE — 85014 HEMATOCRIT: CPT

## 2018-01-11 PROCEDURE — 3700000001 HC ADD 15 MINUTES (ANESTHESIA)

## 2018-01-11 PROCEDURE — 85347 COAGULATION TIME ACTIVATED: CPT

## 2018-01-11 PROCEDURE — 82435 ASSAY OF BLOOD CHLORIDE: CPT

## 2018-01-11 PROCEDURE — 02K83ZZ MAP CONDUCTION MECHANISM, PERCUTANEOUS APPROACH: ICD-10-PCS | Performed by: INTERNAL MEDICINE

## 2018-01-11 PROCEDURE — 84132 ASSAY OF SERUM POTASSIUM: CPT

## 2018-01-11 PROCEDURE — 6360000002 HC RX W HCPCS: Performed by: NURSE ANESTHETIST, CERTIFIED REGISTERED

## 2018-01-11 PROCEDURE — 4A0234Z MEASUREMENT OF CARDIAC ELECTRICAL ACTIVITY, PERCUTANEOUS APPROACH: ICD-10-PCS | Performed by: INTERNAL MEDICINE

## 2018-01-11 PROCEDURE — C1733 CATH, EP, OTHR THAN COOL-TIP: HCPCS

## 2018-01-11 PROCEDURE — B24BZZ4 ULTRASONOGRAPHY OF HEART WITH AORTA, TRANSESOPHAGEAL: ICD-10-PCS | Performed by: INTERNAL MEDICINE

## 2018-01-11 PROCEDURE — 2500000003 HC RX 250 WO HCPCS: Performed by: INTERNAL MEDICINE

## 2018-01-11 PROCEDURE — 82947 ASSAY GLUCOSE BLOOD QUANT: CPT

## 2018-01-11 PROCEDURE — 2500000003 HC RX 250 WO HCPCS: Performed by: NURSE ANESTHETIST, CERTIFIED REGISTERED

## 2018-01-11 RX ORDER — LISINOPRIL 10 MG/1
10 TABLET ORAL DAILY
Status: DISCONTINUED | OUTPATIENT
Start: 2018-01-12 | End: 2018-01-12

## 2018-01-11 RX ORDER — ACETAMINOPHEN 325 MG/1
650 TABLET ORAL EVERY 4 HOURS PRN
Status: DISCONTINUED | OUTPATIENT
Start: 2018-01-11 | End: 2018-01-13 | Stop reason: HOSPADM

## 2018-01-11 RX ORDER — MIDAZOLAM HYDROCHLORIDE 1 MG/ML
1 INJECTION INTRAMUSCULAR; INTRAVENOUS ONCE
Status: COMPLETED | OUTPATIENT
Start: 2018-01-11 | End: 2018-01-11

## 2018-01-11 RX ORDER — PROTAMINE SULFATE 10 MG/ML
INJECTION, SOLUTION INTRAVENOUS PRN
Status: DISCONTINUED | OUTPATIENT
Start: 2018-01-11 | End: 2018-01-11 | Stop reason: SDUPTHER

## 2018-01-11 RX ORDER — ONDANSETRON 2 MG/ML
INJECTION INTRAMUSCULAR; INTRAVENOUS PRN
Status: DISCONTINUED | OUTPATIENT
Start: 2018-01-11 | End: 2018-01-11 | Stop reason: SDUPTHER

## 2018-01-11 RX ORDER — SODIUM CHLORIDE 9 MG/ML
INJECTION, SOLUTION INTRAVENOUS CONTINUOUS PRN
Status: DISCONTINUED | OUTPATIENT
Start: 2018-01-11 | End: 2018-01-11 | Stop reason: SDUPTHER

## 2018-01-11 RX ORDER — HEPARIN SODIUM 1000 [USP'U]/ML
INJECTION, SOLUTION INTRAVENOUS; SUBCUTANEOUS PRN
Status: DISCONTINUED | OUTPATIENT
Start: 2018-01-11 | End: 2018-01-11 | Stop reason: SDUPTHER

## 2018-01-11 RX ORDER — PROPOFOL 10 MG/ML
INJECTION, EMULSION INTRAVENOUS PRN
Status: DISCONTINUED | OUTPATIENT
Start: 2018-01-11 | End: 2018-01-11 | Stop reason: SDUPTHER

## 2018-01-11 RX ORDER — EPHEDRINE SULFATE 50 MG/ML
INJECTION INTRAVENOUS PRN
Status: DISCONTINUED | OUTPATIENT
Start: 2018-01-11 | End: 2018-01-11 | Stop reason: SDUPTHER

## 2018-01-11 RX ORDER — DABIGATRAN ETEXILATE 150 MG/1
150 CAPSULE, COATED PELLETS ORAL 2 TIMES DAILY
Status: DISCONTINUED | OUTPATIENT
Start: 2018-01-11 | End: 2018-01-13 | Stop reason: HOSPADM

## 2018-01-11 RX ORDER — MIDAZOLAM HYDROCHLORIDE 1 MG/ML
INJECTION INTRAMUSCULAR; INTRAVENOUS PRN
Status: DISCONTINUED | OUTPATIENT
Start: 2018-01-11 | End: 2018-01-11 | Stop reason: SDUPTHER

## 2018-01-11 RX ORDER — ASPIRIN 81 MG/1
81 TABLET ORAL DAILY
Status: DISCONTINUED | OUTPATIENT
Start: 2018-01-11 | End: 2018-01-13 | Stop reason: HOSPADM

## 2018-01-11 RX ORDER — FENTANYL CITRATE 50 UG/ML
INJECTION, SOLUTION INTRAMUSCULAR; INTRAVENOUS PRN
Status: DISCONTINUED | OUTPATIENT
Start: 2018-01-11 | End: 2018-01-11 | Stop reason: SDUPTHER

## 2018-01-11 RX ORDER — SIMVASTATIN 40 MG
40 TABLET ORAL NIGHTLY
Status: DISCONTINUED | OUTPATIENT
Start: 2018-01-11 | End: 2018-01-13 | Stop reason: HOSPADM

## 2018-01-11 RX ORDER — HEPARIN SODIUM 10000 [USP'U]/100ML
INJECTION, SOLUTION INTRAVENOUS CONTINUOUS PRN
Status: DISCONTINUED | OUTPATIENT
Start: 2018-01-11 | End: 2018-01-11 | Stop reason: SDUPTHER

## 2018-01-11 RX ORDER — GLYCOPYRROLATE 0.2 MG/ML
INJECTION INTRAMUSCULAR; INTRAVENOUS PRN
Status: DISCONTINUED | OUTPATIENT
Start: 2018-01-11 | End: 2018-01-11 | Stop reason: SDUPTHER

## 2018-01-11 RX ORDER — ROCURONIUM BROMIDE 10 MG/ML
INJECTION, SOLUTION INTRAVENOUS PRN
Status: DISCONTINUED | OUTPATIENT
Start: 2018-01-11 | End: 2018-01-11 | Stop reason: SDUPTHER

## 2018-01-11 RX ORDER — 0.9 % SODIUM CHLORIDE 0.9 %
500 INTRAVENOUS SOLUTION INTRAVENOUS ONCE
Status: COMPLETED | OUTPATIENT
Start: 2018-01-11 | End: 2018-01-11

## 2018-01-11 RX ORDER — SODIUM CHLORIDE 9 MG/ML
INJECTION, SOLUTION INTRAVENOUS CONTINUOUS
Status: DISCONTINUED | OUTPATIENT
Start: 2018-01-11 | End: 2018-01-13

## 2018-01-11 RX ORDER — SODIUM CHLORIDE 0.9 % (FLUSH) 0.9 %
10 SYRINGE (ML) INJECTION PRN
Status: DISCONTINUED | OUTPATIENT
Start: 2018-01-11 | End: 2018-01-13 | Stop reason: HOSPADM

## 2018-01-11 RX ORDER — PANTOPRAZOLE SODIUM 40 MG/1
40 TABLET, DELAYED RELEASE ORAL
Status: DISCONTINUED | OUTPATIENT
Start: 2018-01-11 | End: 2018-01-13 | Stop reason: HOSPADM

## 2018-01-11 RX ORDER — AMIODARONE HYDROCHLORIDE 200 MG/1
200 TABLET ORAL DAILY
Status: DISCONTINUED | OUTPATIENT
Start: 2018-01-11 | End: 2018-01-12

## 2018-01-11 RX ORDER — SODIUM CHLORIDE 0.9 % (FLUSH) 0.9 %
10 SYRINGE (ML) INJECTION EVERY 12 HOURS SCHEDULED
Status: DISCONTINUED | OUTPATIENT
Start: 2018-01-11 | End: 2018-01-13 | Stop reason: HOSPADM

## 2018-01-11 RX ORDER — LIDOCAINE HYDROCHLORIDE 10 MG/ML
INJECTION, SOLUTION EPIDURAL; INFILTRATION; INTRACAUDAL; PERINEURAL PRN
Status: DISCONTINUED | OUTPATIENT
Start: 2018-01-11 | End: 2018-01-11 | Stop reason: SDUPTHER

## 2018-01-11 RX ORDER — LIDOCAINE HYDROCHLORIDE 10 MG/ML
0.4 INJECTION, SOLUTION INFILTRATION; PERINEURAL ONCE
Status: COMPLETED | OUTPATIENT
Start: 2018-01-11 | End: 2018-01-11

## 2018-01-11 RX ADMIN — FENTANYL CITRATE 50 MCG: 50 INJECTION INTRAMUSCULAR; INTRAVENOUS at 09:12

## 2018-01-11 RX ADMIN — EPHEDRINE SULFATE 10 MG: 50 INJECTION, SOLUTION INTRAVENOUS at 09:23

## 2018-01-11 RX ADMIN — PANTOPRAZOLE SODIUM 40 MG: 40 TABLET, DELAYED RELEASE ORAL at 20:24

## 2018-01-11 RX ADMIN — LIDOCAINE HYDROCHLORIDE 50 MG: 10 INJECTION, SOLUTION EPIDURAL; INFILTRATION; INTRACAUDAL; PERINEURAL at 09:12

## 2018-01-11 RX ADMIN — SODIUM CHLORIDE: 9 INJECTION, SOLUTION INTRAVENOUS at 09:39

## 2018-01-11 RX ADMIN — SODIUM CHLORIDE: 900 INJECTION INTRAVENOUS at 09:25

## 2018-01-11 RX ADMIN — SODIUM CHLORIDE: 900 INJECTION INTRAVENOUS at 13:34

## 2018-01-11 RX ADMIN — SODIUM CHLORIDE 500 ML: 9 INJECTION, SOLUTION INTRAVENOUS at 15:21

## 2018-01-11 RX ADMIN — MIDAZOLAM HYDROCHLORIDE 1 MG: 1 INJECTION INTRAMUSCULAR; INTRAVENOUS at 08:50

## 2018-01-11 RX ADMIN — PROTAMINE SULFATE 25 MG: 10 INJECTION, SOLUTION INTRAVENOUS at 13:25

## 2018-01-11 RX ADMIN — ROCURONIUM BROMIDE 50 MG: 10 INJECTION, SOLUTION INTRAVENOUS at 09:12

## 2018-01-11 RX ADMIN — LIDOCAINE HYDROCHLORIDE 0.4 ML: 10 INJECTION, SOLUTION INFILTRATION; PERINEURAL at 08:36

## 2018-01-11 RX ADMIN — PHENYLEPHRINE HYDROCHLORIDE 100 MCG: 10 INJECTION INTRAVENOUS at 10:06

## 2018-01-11 RX ADMIN — Medication 10 ML: at 20:25

## 2018-01-11 RX ADMIN — SIMVASTATIN 40 MG: 40 TABLET, FILM COATED ORAL at 20:23

## 2018-01-11 RX ADMIN — GLYCOPYRROLATE 0.2 MG: 0.2 INJECTION INTRAMUSCULAR; INTRAVENOUS at 09:18

## 2018-01-11 RX ADMIN — SODIUM CHLORIDE: 9 INJECTION, SOLUTION INTRAVENOUS at 08:38

## 2018-01-11 RX ADMIN — DABIGATRAN ETEXILATE MESYLATE 150 MG: 150 CAPSULE ORAL at 20:24

## 2018-01-11 RX ADMIN — FENTANYL CITRATE 50 MCG: 50 INJECTION INTRAMUSCULAR; INTRAVENOUS at 09:08

## 2018-01-11 RX ADMIN — EPHEDRINE SULFATE 10 MG: 50 INJECTION, SOLUTION INTRAVENOUS at 09:32

## 2018-01-11 RX ADMIN — PHENYLEPHRINE HYDROCHLORIDE 50 MCG: 10 INJECTION INTRAVENOUS at 11:31

## 2018-01-11 RX ADMIN — PROTAMINE SULFATE 25 MG: 10 INJECTION, SOLUTION INTRAVENOUS at 13:20

## 2018-01-11 RX ADMIN — PHENYLEPHRINE HYDROCHLORIDE 200 MCG: 10 INJECTION INTRAVENOUS at 09:20

## 2018-01-11 RX ADMIN — ONDANSETRON 4 MG: 2 INJECTION INTRAMUSCULAR; INTRAVENOUS at 13:37

## 2018-01-11 RX ADMIN — HEPARIN SODIUM 5000 UNITS: 1000 INJECTION, SOLUTION INTRAVENOUS; SUBCUTANEOUS at 10:47

## 2018-01-11 RX ADMIN — ACETAMINOPHEN 650 MG: 325 TABLET ORAL at 17:01

## 2018-01-11 RX ADMIN — PROPOFOL 150 MG: 10 INJECTION, EMULSION INTRAVENOUS at 09:12

## 2018-01-11 RX ADMIN — SODIUM CHLORIDE 500 ML: 9 INJECTION, SOLUTION INTRAVENOUS at 16:10

## 2018-01-11 RX ADMIN — HEPARIN SODIUM 12000 UNITS: 1000 INJECTION, SOLUTION INTRAVENOUS; SUBCUTANEOUS at 10:27

## 2018-01-11 RX ADMIN — SODIUM CHLORIDE: 900 INJECTION INTRAVENOUS at 10:15

## 2018-01-11 RX ADMIN — HEPARIN SODIUM AND DEXTROSE 2000 UNITS/HR: 10000; 5 INJECTION INTRAVENOUS at 10:32

## 2018-01-11 RX ADMIN — EDROPHONIUM CHLORIDE 25 MG: 10 INJECTION, SOLUTION INTRAMUSCULAR; INTRAVENOUS at 13:36

## 2018-01-11 RX ADMIN — PHENYLEPHRINE HYDROCHLORIDE 75 MCG: 10 INJECTION INTRAVENOUS at 11:17

## 2018-01-11 RX ADMIN — PHENYLEPHRINE HYDROCHLORIDE 30 MCG: 10 INJECTION INTRAVENOUS at 10:09

## 2018-01-11 RX ADMIN — MIDAZOLAM HYDROCHLORIDE 1 MG: 1 INJECTION, SOLUTION INTRAMUSCULAR; INTRAVENOUS at 09:07

## 2018-01-11 RX ADMIN — GLYCOPYRROLATE 0.1 MG: 0.2 INJECTION INTRAMUSCULAR; INTRAVENOUS at 13:34

## 2018-01-11 ASSESSMENT — PULMONARY FUNCTION TESTS
PIF_VALUE: 17
PIF_VALUE: 0
PIF_VALUE: 19
PIF_VALUE: 21
PIF_VALUE: 20
PIF_VALUE: 17
PIF_VALUE: 17
PIF_VALUE: 19
PIF_VALUE: 19
PIF_VALUE: 17
PIF_VALUE: 18
PIF_VALUE: 18
PIF_VALUE: 17
PIF_VALUE: 19
PIF_VALUE: 19
PIF_VALUE: 17
PIF_VALUE: 18
PIF_VALUE: 18
PIF_VALUE: 17
PIF_VALUE: 18
PIF_VALUE: 18
PIF_VALUE: 17
PIF_VALUE: 1
PIF_VALUE: 17
PIF_VALUE: 19
PIF_VALUE: 18
PIF_VALUE: 19
PIF_VALUE: 18
PIF_VALUE: 19
PIF_VALUE: 20
PIF_VALUE: 18
PIF_VALUE: 19
PIF_VALUE: 18
PIF_VALUE: 18
PIF_VALUE: 17
PIF_VALUE: 0
PIF_VALUE: 18
PIF_VALUE: 17
PIF_VALUE: 19
PIF_VALUE: 17
PIF_VALUE: 19
PIF_VALUE: 18
PIF_VALUE: 19
PIF_VALUE: 19
PIF_VALUE: 1
PIF_VALUE: 17
PIF_VALUE: 18
PIF_VALUE: 19
PIF_VALUE: 18
PIF_VALUE: 19
PIF_VALUE: 17
PIF_VALUE: 19
PIF_VALUE: 20
PIF_VALUE: 17
PIF_VALUE: 19
PIF_VALUE: 22
PIF_VALUE: 17
PIF_VALUE: 14
PIF_VALUE: 4
PIF_VALUE: 18
PIF_VALUE: 19
PIF_VALUE: 18
PIF_VALUE: 18
PIF_VALUE: 17
PIF_VALUE: 19
PIF_VALUE: 19
PIF_VALUE: 17
PIF_VALUE: 18
PIF_VALUE: 20
PIF_VALUE: 19
PIF_VALUE: 18
PIF_VALUE: 18
PIF_VALUE: 0
PIF_VALUE: 17
PIF_VALUE: 17
PIF_VALUE: 18
PIF_VALUE: 19
PIF_VALUE: 17
PIF_VALUE: 20
PIF_VALUE: 17
PIF_VALUE: 18
PIF_VALUE: 18
PIF_VALUE: 19
PIF_VALUE: 18
PIF_VALUE: 17
PIF_VALUE: 18
PIF_VALUE: 18
PIF_VALUE: 20
PIF_VALUE: 18
PIF_VALUE: 19
PIF_VALUE: 18
PIF_VALUE: 18
PIF_VALUE: 19
PIF_VALUE: 18
PIF_VALUE: 17
PIF_VALUE: 22
PIF_VALUE: 18
PIF_VALUE: 0
PIF_VALUE: 18
PIF_VALUE: 18
PIF_VALUE: 17
PIF_VALUE: 19
PIF_VALUE: 20
PIF_VALUE: 17
PIF_VALUE: 17
PIF_VALUE: 18
PIF_VALUE: 17
PIF_VALUE: 18
PIF_VALUE: 19
PIF_VALUE: 18
PIF_VALUE: 20
PIF_VALUE: 17
PIF_VALUE: 18
PIF_VALUE: 19
PIF_VALUE: 18
PIF_VALUE: 18
PIF_VALUE: 20
PIF_VALUE: 17
PIF_VALUE: 21
PIF_VALUE: 18
PIF_VALUE: 19
PIF_VALUE: 18
PIF_VALUE: 19
PIF_VALUE: 17
PIF_VALUE: 18
PIF_VALUE: 17
PIF_VALUE: 19
PIF_VALUE: 19
PIF_VALUE: 17
PIF_VALUE: 19
PIF_VALUE: 19
PIF_VALUE: 17
PIF_VALUE: 18
PIF_VALUE: 20
PIF_VALUE: 18
PIF_VALUE: 0
PIF_VALUE: 17
PIF_VALUE: 1
PIF_VALUE: 17
PIF_VALUE: 18
PIF_VALUE: 17
PIF_VALUE: 18
PIF_VALUE: 17
PIF_VALUE: 18
PIF_VALUE: 17
PIF_VALUE: 18
PIF_VALUE: 21
PIF_VALUE: 19
PIF_VALUE: 21
PIF_VALUE: 18
PIF_VALUE: 18
PIF_VALUE: 19
PIF_VALUE: 21
PIF_VALUE: 17
PIF_VALUE: 18
PIF_VALUE: 21
PIF_VALUE: 17
PIF_VALUE: 18
PIF_VALUE: 17
PIF_VALUE: 17
PIF_VALUE: 19
PIF_VALUE: 18
PIF_VALUE: 17
PIF_VALUE: 19
PIF_VALUE: 18
PIF_VALUE: 18
PIF_VALUE: 19
PIF_VALUE: 18
PIF_VALUE: 18
PIF_VALUE: 1
PIF_VALUE: 18
PIF_VALUE: 17
PIF_VALUE: 18
PIF_VALUE: 18
PIF_VALUE: 0
PIF_VALUE: 18
PIF_VALUE: 17
PIF_VALUE: 0
PIF_VALUE: 18
PIF_VALUE: 18
PIF_VALUE: 21
PIF_VALUE: 20
PIF_VALUE: 18
PIF_VALUE: 17
PIF_VALUE: 3
PIF_VALUE: 21
PIF_VALUE: 17
PIF_VALUE: 22
PIF_VALUE: 17
PIF_VALUE: 18
PIF_VALUE: 19
PIF_VALUE: 17
PIF_VALUE: 21
PIF_VALUE: 18
PIF_VALUE: 18
PIF_VALUE: 21
PIF_VALUE: 19
PIF_VALUE: 18
PIF_VALUE: 0
PIF_VALUE: 17
PIF_VALUE: 19
PIF_VALUE: 19
PIF_VALUE: 18
PIF_VALUE: 17
PIF_VALUE: 18
PIF_VALUE: 17
PIF_VALUE: 17
PIF_VALUE: 18
PIF_VALUE: 17
PIF_VALUE: 19
PIF_VALUE: 18
PIF_VALUE: 17
PIF_VALUE: 1
PIF_VALUE: 17
PIF_VALUE: 18
PIF_VALUE: 18
PIF_VALUE: 17
PIF_VALUE: 19
PIF_VALUE: 1
PIF_VALUE: 19
PIF_VALUE: 18
PIF_VALUE: 18
PIF_VALUE: 22
PIF_VALUE: 17
PIF_VALUE: 0
PIF_VALUE: 17
PIF_VALUE: 21
PIF_VALUE: 18
PIF_VALUE: 2
PIF_VALUE: 17
PIF_VALUE: 17
PIF_VALUE: 18
PIF_VALUE: 19
PIF_VALUE: 20
PIF_VALUE: 17
PIF_VALUE: 17
PIF_VALUE: 4
PIF_VALUE: 19
PIF_VALUE: 18
PIF_VALUE: 1
PIF_VALUE: 17
PIF_VALUE: 18
PIF_VALUE: 2
PIF_VALUE: 18
PIF_VALUE: 20
PIF_VALUE: 18
PIF_VALUE: 17
PIF_VALUE: 18
PIF_VALUE: 18
PIF_VALUE: 21
PIF_VALUE: 18
PIF_VALUE: 2
PIF_VALUE: 18
PIF_VALUE: 18
PIF_VALUE: 19
PIF_VALUE: 20
PIF_VALUE: 19
PIF_VALUE: 19
PIF_VALUE: 2

## 2018-01-11 ASSESSMENT — LIFESTYLE VARIABLES: SMOKING_STATUS: 0

## 2018-01-11 ASSESSMENT — PAIN SCALES - GENERAL
PAINLEVEL_OUTOF10: 0
PAINLEVEL_OUTOF10: 4
PAINLEVEL_OUTOF10: 0

## 2018-01-11 NOTE — ANESTHESIA PRE PROCEDURE
Department of Anesthesiology  Preprocedure Note       Name:  Cindy Larkin   Age:  74 y.o.  :  1945                                          MRN:  [id number]         Date:  2018      Surgeon: * Surgery not found *    Procedure:     Medications prior to admission:   Prior to Admission medications    Medication Sig Start Date End Date Taking? Authorizing Provider   simvastatin (ZOCOR) 40 MG tablet TAKE 1 TABLET AT BEDTIME 11/3/17  Yes Em Guerrero MD   lisinopril (PRINIVIL;ZESTRIL) 10 MG tablet Take 1 tablet by mouth daily 3/7/17  Yes Em Guerrero MD   omeprazole (PRILOSEC OTC) 20 MG tablet Take 1 tablet by mouth daily 11/3/15  Yes Em Guerrero MD   aspirin 81 MG tablet Take 81 mg by mouth daily. Yes Historical Provider, MD   amiodarone (CORDARONE) 200 MG tablet Take 1 tablet by mouth 2 times daily 17   Rosa Espitia MD   dabigatran (PRADAXA) 150 MG capsule Take 1 capsule by mouth 2 times daily 16   Micki Myers MD       Current medications:    Current Outpatient Prescriptions   Medication Sig Dispense Refill    simvastatin (ZOCOR) 40 MG tablet TAKE 1 TABLET AT BEDTIME 90 tablet 2    lisinopril (PRINIVIL;ZESTRIL) 10 MG tablet Take 1 tablet by mouth daily 90 tablet 3    omeprazole (PRILOSEC OTC) 20 MG tablet Take 1 tablet by mouth daily 90 tablet 1    aspirin 81 MG tablet Take 81 mg by mouth daily.  amiodarone (CORDARONE) 200 MG tablet Take 1 tablet by mouth 2 times daily 30 tablet 3    dabigatran (PRADAXA) 150 MG capsule Take 1 capsule by mouth 2 times daily 60 capsule 3     Current Facility-Administered Medications   Medication Dose Route Frequency Provider Last Rate Last Dose    0.9 % sodium chloride infusion   Intravenous Continuous Hossameldin CESAR Mae MD 50 mL/hr at 18 0838      midazolam (VERSED) injection 1 mg  1 mg Intravenous Once Dayo Jensen MD           Allergies:     Allergies   Allergen Reactions    Penicillins     Sulfa Antibiotics Rash

## 2018-01-11 NOTE — ANESTHESIA PROCEDURE NOTES
Arterial Line:    An arterial line was placed using surface landmarks, in the OR for the following indication(s): continuous blood pressure monitoring and blood sampling needed. A 20 gauge (size), 1 and 3/8 inch (length), Arrow (type) catheter was placed, Seldinger technique used, into the right radial artery, secured by tape and Tegaderm. Anesthesia type: Local  Local infiltration: Injection    Events:  patient tolerated procedure well with no complications. Additional notes: Attempted on the left radial in pre op area unable to thread the catheter, abandoned.   1/11/2018 9:12 AM1/11/2018 9:15 AM  Anesthesiologist: Rashaun Bower  Performed: Anesthesiologist   Preanesthetic Checklist  Completed: patient identified, site marked, surgical consent, pre-op evaluation, timeout performed, IV checked, risks and benefits discussed, monitors and equipment checked, anesthesia consent given, oxygen available and patient being monitored

## 2018-01-12 LAB
-: NORMAL
AMORPHOUS: NORMAL
ANION GAP SERPL CALCULATED.3IONS-SCNC: 15 MMOL/L (ref 9–17)
BACTERIA: NORMAL
BILIRUBIN URINE: NEGATIVE
BUN BLDV-MCNC: 8 MG/DL (ref 8–23)
BUN/CREAT BLD: ABNORMAL (ref 9–20)
CALCIUM SERPL-MCNC: 7.4 MG/DL (ref 8.6–10.4)
CASTS UA: NORMAL /LPF (ref 0–8)
CHLORIDE BLD-SCNC: 109 MMOL/L (ref 98–107)
CO2: 18 MMOL/L (ref 20–31)
COLOR: YELLOW
COMMENT UA: ABNORMAL
CREAT SERPL-MCNC: 0.65 MG/DL (ref 0.5–0.9)
CRYSTALS, UA: NORMAL /HPF
EKG ATRIAL RATE: 46 BPM
EKG ATRIAL RATE: 52 BPM
EKG P AXIS: 58 DEGREES
EKG P AXIS: 86 DEGREES
EKG P-R INTERVAL: 172 MS
EKG P-R INTERVAL: 182 MS
EKG Q-T INTERVAL: 456 MS
EKG Q-T INTERVAL: 546 MS
EKG QRS DURATION: 100 MS
EKG QRS DURATION: 94 MS
EKG QTC CALCULATION (BAZETT): 399 MS
EKG QTC CALCULATION (BAZETT): 507 MS
EKG R AXIS: 33 DEGREES
EKG R AXIS: 46 DEGREES
EKG T AXIS: 70 DEGREES
EKG T AXIS: 92 DEGREES
EKG VENTRICULAR RATE: 46 BPM
EKG VENTRICULAR RATE: 52 BPM
EPITHELIAL CELLS UA: NORMAL /HPF (ref 0–5)
GFR AFRICAN AMERICAN: >60 ML/MIN
GFR NON-AFRICAN AMERICAN: 40 ML/MIN
GFR NON-AFRICAN AMERICAN: >60 ML/MIN
GFR SERPL CREATININE-BSD FRML MDRD: 48 ML/MIN
GFR SERPL CREATININE-BSD FRML MDRD: ABNORMAL ML/MIN/{1.73_M2}
GLUCOSE BLD-MCNC: 111 MG/DL (ref 70–99)
GLUCOSE URINE: NEGATIVE
HCT VFR BLD CALC: 33.5 % (ref 36.3–47.1)
HEMOGLOBIN: 10.7 G/DL (ref 11.9–15.1)
KETONES, URINE: NEGATIVE
LEUKOCYTE ESTERASE, URINE: ABNORMAL
MAGNESIUM: 1.6 MG/DL (ref 1.6–2.6)
MCH RBC QN AUTO: 31.4 PG (ref 25.2–33.5)
MCHC RBC AUTO-ENTMCNC: 31.9 G/DL (ref 28.4–34.8)
MCV RBC AUTO: 98.2 FL (ref 82.6–102.9)
MUCUS: NORMAL
NITRITE, URINE: NEGATIVE
OTHER OBSERVATIONS UA: NORMAL
PDW BLD-RTO: 12.7 % (ref 11.8–14.4)
PH UA: 6 (ref 5–8)
PLATELET # BLD: 145 K/UL (ref 138–453)
PMV BLD AUTO: 10 FL (ref 8.1–13.5)
POC CREATININE: 1.31 MG/DL (ref 0.51–1.19)
POC POTASSIUM: >12 MMOL/L (ref 3.5–4.5)
POTASSIUM SERPL-SCNC: 4 MMOL/L (ref 3.7–5.3)
PROTEIN UA: NEGATIVE
RBC # BLD: 3.41 M/UL (ref 3.95–5.11)
RBC UA: NORMAL /HPF (ref 0–4)
RENAL EPITHELIAL, UA: NORMAL /HPF
SODIUM BLD-SCNC: 142 MMOL/L (ref 135–144)
SPECIFIC GRAVITY UA: 1 (ref 1–1.03)
TRICHOMONAS: NORMAL
TURBIDITY: CLEAR
URINE HGB: NEGATIVE
UROBILINOGEN, URINE: NORMAL
WBC # BLD: 8.1 K/UL (ref 3.5–11.3)
WBC UA: NORMAL /HPF (ref 0–5)
YEAST: NORMAL

## 2018-01-12 PROCEDURE — 6360000002 HC RX W HCPCS: Performed by: INTERNAL MEDICINE

## 2018-01-12 PROCEDURE — 93005 ELECTROCARDIOGRAM TRACING: CPT

## 2018-01-12 PROCEDURE — 80048 BASIC METABOLIC PNL TOTAL CA: CPT

## 2018-01-12 PROCEDURE — 2060000000 HC ICU INTERMEDIATE R&B

## 2018-01-12 PROCEDURE — 2580000003 HC RX 258: Performed by: INTERNAL MEDICINE

## 2018-01-12 PROCEDURE — 36415 COLL VENOUS BLD VENIPUNCTURE: CPT

## 2018-01-12 PROCEDURE — 85027 COMPLETE CBC AUTOMATED: CPT

## 2018-01-12 PROCEDURE — 81001 URINALYSIS AUTO W/SCOPE: CPT

## 2018-01-12 PROCEDURE — 6370000000 HC RX 637 (ALT 250 FOR IP): Performed by: STUDENT IN AN ORGANIZED HEALTH CARE EDUCATION/TRAINING PROGRAM

## 2018-01-12 PROCEDURE — 6370000000 HC RX 637 (ALT 250 FOR IP): Performed by: INTERNAL MEDICINE

## 2018-01-12 PROCEDURE — 83735 ASSAY OF MAGNESIUM: CPT

## 2018-01-12 PROCEDURE — 2500000003 HC RX 250 WO HCPCS: Performed by: STUDENT IN AN ORGANIZED HEALTH CARE EDUCATION/TRAINING PROGRAM

## 2018-01-12 PROCEDURE — 94762 N-INVAS EAR/PLS OXIMTRY CONT: CPT

## 2018-01-12 RX ORDER — MAGNESIUM SULFATE 1 G/100ML
1 INJECTION INTRAVENOUS
Status: COMPLETED | OUTPATIENT
Start: 2018-01-12 | End: 2018-01-12

## 2018-01-12 RX ORDER — DOPAMINE HYDROCHLORIDE 160 MG/100ML
5 INJECTION, SOLUTION INTRAVENOUS CONTINUOUS
Status: DISCONTINUED | OUTPATIENT
Start: 2018-01-12 | End: 2018-01-13

## 2018-01-12 RX ORDER — METOPROLOL TARTRATE 5 MG/5ML
5 INJECTION INTRAVENOUS EVERY 6 HOURS PRN
Status: DISCONTINUED | OUTPATIENT
Start: 2018-01-12 | End: 2018-01-13 | Stop reason: HOSPADM

## 2018-01-12 RX ORDER — AMIODARONE HYDROCHLORIDE 200 MG/1
200 TABLET ORAL DAILY
Status: DISCONTINUED | OUTPATIENT
Start: 2018-01-12 | End: 2018-01-13 | Stop reason: HOSPADM

## 2018-01-12 RX ORDER — AMIODARONE HYDROCHLORIDE 200 MG/1
200 TABLET ORAL DAILY
Status: DISCONTINUED | OUTPATIENT
Start: 2018-01-12 | End: 2018-01-12 | Stop reason: SDUPTHER

## 2018-01-12 RX ADMIN — SIMVASTATIN 40 MG: 40 TABLET, FILM COATED ORAL at 20:05

## 2018-01-12 RX ADMIN — PANTOPRAZOLE SODIUM 40 MG: 40 TABLET, DELAYED RELEASE ORAL at 08:19

## 2018-01-12 RX ADMIN — MAGNESIUM SULFATE HEPTAHYDRATE 1 G: 1 INJECTION, SOLUTION INTRAVENOUS at 15:51

## 2018-01-12 RX ADMIN — AMIODARONE HYDROCHLORIDE 200 MG: 200 TABLET ORAL at 18:46

## 2018-01-12 RX ADMIN — DABIGATRAN ETEXILATE MESYLATE 150 MG: 150 CAPSULE ORAL at 08:21

## 2018-01-12 RX ADMIN — DOPAMINE HYDROCHLORIDE 2.5 MCG/KG/MIN: 160 INJECTION, SOLUTION INTRAVENOUS at 05:46

## 2018-01-12 RX ADMIN — Medication 10 ML: at 20:06

## 2018-01-12 RX ADMIN — ASPIRIN 81 MG: 81 TABLET, COATED ORAL at 08:21

## 2018-01-12 RX ADMIN — AMIODARONE HYDROCHLORIDE 200 MG: 200 TABLET ORAL at 21:48

## 2018-01-12 RX ADMIN — DABIGATRAN ETEXILATE MESYLATE 150 MG: 150 CAPSULE ORAL at 20:05

## 2018-01-12 RX ADMIN — METOROPROLOL TARTRATE 5 MG: 5 INJECTION, SOLUTION INTRAVENOUS at 19:54

## 2018-01-12 RX ADMIN — ACETAMINOPHEN 650 MG: 325 TABLET ORAL at 04:23

## 2018-01-12 RX ADMIN — PANTOPRAZOLE SODIUM 40 MG: 40 TABLET, DELAYED RELEASE ORAL at 14:54

## 2018-01-12 RX ADMIN — MAGNESIUM SULFATE HEPTAHYDRATE 1 G: 1 INJECTION, SOLUTION INTRAVENOUS at 16:54

## 2018-01-12 ASSESSMENT — PAIN SCALES - GENERAL
PAINLEVEL_OUTOF10: 0
PAINLEVEL_OUTOF10: 5
PAINLEVEL_OUTOF10: 0

## 2018-01-13 ENCOUNTER — APPOINTMENT (OUTPATIENT)
Dept: GENERAL RADIOLOGY | Age: 73
DRG: 273 | End: 2018-01-13
Attending: INTERNAL MEDICINE
Payer: MEDICARE

## 2018-01-13 VITALS
SYSTOLIC BLOOD PRESSURE: 108 MMHG | RESPIRATION RATE: 18 BRPM | BODY MASS INDEX: 35.19 KG/M2 | WEIGHT: 206.13 LBS | DIASTOLIC BLOOD PRESSURE: 56 MMHG | HEIGHT: 64 IN | OXYGEN SATURATION: 99 % | TEMPERATURE: 99.4 F | HEART RATE: 54 BPM

## 2018-01-13 LAB
ABSOLUTE EOS #: 0.43 K/UL (ref 0–0.44)
ABSOLUTE IMMATURE GRANULOCYTE: 0.03 K/UL (ref 0–0.3)
ABSOLUTE LYMPH #: 1.36 K/UL (ref 1.1–3.7)
ABSOLUTE MONO #: 0.77 K/UL (ref 0.1–1.2)
ANION GAP SERPL CALCULATED.3IONS-SCNC: 13 MMOL/L (ref 9–17)
BASOPHILS # BLD: 1 % (ref 0–2)
BASOPHILS ABSOLUTE: 0.05 K/UL (ref 0–0.2)
BUN BLDV-MCNC: 7 MG/DL (ref 8–23)
BUN/CREAT BLD: ABNORMAL (ref 9–20)
CALCIUM SERPL-MCNC: 8.2 MG/DL (ref 8.6–10.4)
CHLORIDE BLD-SCNC: 104 MMOL/L (ref 98–107)
CO2: 24 MMOL/L (ref 20–31)
CREAT SERPL-MCNC: 0.75 MG/DL (ref 0.5–0.9)
DIFFERENTIAL TYPE: ABNORMAL
EKG ATRIAL RATE: 129 BPM
EKG ATRIAL RATE: 65 BPM
EKG ATRIAL RATE: 67 BPM
EKG P AXIS: 58 DEGREES
EKG P AXIS: 71 DEGREES
EKG P-R INTERVAL: 174 MS
EKG P-R INTERVAL: 178 MS
EKG Q-T INTERVAL: 340 MS
EKG Q-T INTERVAL: 410 MS
EKG Q-T INTERVAL: 432 MS
EKG QRS DURATION: 100 MS
EKG QRS DURATION: 104 MS
EKG QRS DURATION: 116 MS
EKG QTC CALCULATION (BAZETT): 433 MS
EKG QTC CALCULATION (BAZETT): 449 MS
EKG QTC CALCULATION (BAZETT): 513 MS
EKG R AXIS: 34 DEGREES
EKG R AXIS: 35 DEGREES
EKG R AXIS: 51 DEGREES
EKG T AXIS: -119 DEGREES
EKG T AXIS: 55 DEGREES
EKG T AXIS: 68 DEGREES
EKG VENTRICULAR RATE: 137 BPM
EKG VENTRICULAR RATE: 65 BPM
EKG VENTRICULAR RATE: 67 BPM
EOSINOPHILS RELATIVE PERCENT: 4 % (ref 1–4)
GFR AFRICAN AMERICAN: >60 ML/MIN
GFR NON-AFRICAN AMERICAN: >60 ML/MIN
GFR SERPL CREATININE-BSD FRML MDRD: ABNORMAL ML/MIN/{1.73_M2}
GFR SERPL CREATININE-BSD FRML MDRD: ABNORMAL ML/MIN/{1.73_M2}
GLUCOSE BLD-MCNC: 94 MG/DL (ref 70–99)
HCT VFR BLD CALC: 35.9 % (ref 36.3–47.1)
HEMOGLOBIN: 11.5 G/DL (ref 11.9–15.1)
IMMATURE GRANULOCYTES: 0 %
LYMPHOCYTES # BLD: 14 % (ref 24–43)
MAGNESIUM: 1.8 MG/DL (ref 1.6–2.6)
MCH RBC QN AUTO: 31.3 PG (ref 25.2–33.5)
MCHC RBC AUTO-ENTMCNC: 32 G/DL (ref 28.4–34.8)
MCV RBC AUTO: 97.6 FL (ref 82.6–102.9)
MONOCYTES # BLD: 8 % (ref 3–12)
PDW BLD-RTO: 12.4 % (ref 11.8–14.4)
PLATELET # BLD: 142 K/UL (ref 138–453)
PLATELET ESTIMATE: ABNORMAL
PMV BLD AUTO: 10 FL (ref 8.1–13.5)
POTASSIUM SERPL-SCNC: 3.8 MMOL/L (ref 3.7–5.3)
RBC # BLD: 3.68 M/UL (ref 3.95–5.11)
RBC # BLD: ABNORMAL 10*6/UL
SEG NEUTROPHILS: 73 % (ref 36–65)
SEGMENTED NEUTROPHILS ABSOLUTE COUNT: 7.36 K/UL (ref 1.5–8.1)
SODIUM BLD-SCNC: 141 MMOL/L (ref 135–144)
WBC # BLD: 10 K/UL (ref 3.5–11.3)
WBC # BLD: ABNORMAL 10*3/UL

## 2018-01-13 PROCEDURE — 71045 X-RAY EXAM CHEST 1 VIEW: CPT

## 2018-01-13 PROCEDURE — 87804 INFLUENZA ASSAY W/OPTIC: CPT

## 2018-01-13 PROCEDURE — 6360000002 HC RX W HCPCS: Performed by: INTERNAL MEDICINE

## 2018-01-13 PROCEDURE — 6370000000 HC RX 637 (ALT 250 FOR IP): Performed by: INTERNAL MEDICINE

## 2018-01-13 PROCEDURE — 36415 COLL VENOUS BLD VENIPUNCTURE: CPT

## 2018-01-13 PROCEDURE — 94762 N-INVAS EAR/PLS OXIMTRY CONT: CPT

## 2018-01-13 PROCEDURE — 71046 X-RAY EXAM CHEST 2 VIEWS: CPT

## 2018-01-13 PROCEDURE — 6370000000 HC RX 637 (ALT 250 FOR IP): Performed by: STUDENT IN AN ORGANIZED HEALTH CARE EDUCATION/TRAINING PROGRAM

## 2018-01-13 PROCEDURE — 80048 BASIC METABOLIC PNL TOTAL CA: CPT

## 2018-01-13 PROCEDURE — 93226 XTRNL ECG REC<48 HR SCAN A/R: CPT

## 2018-01-13 PROCEDURE — 6360000002 HC RX W HCPCS: Performed by: STUDENT IN AN ORGANIZED HEALTH CARE EDUCATION/TRAINING PROGRAM

## 2018-01-13 PROCEDURE — 83735 ASSAY OF MAGNESIUM: CPT

## 2018-01-13 PROCEDURE — 85025 COMPLETE CBC W/AUTO DIFF WBC: CPT

## 2018-01-13 PROCEDURE — 2580000003 HC RX 258: Performed by: INTERNAL MEDICINE

## 2018-01-13 PROCEDURE — 93225 XTRNL ECG REC<48 HRS REC: CPT

## 2018-01-13 RX ORDER — POTASSIUM CHLORIDE 20 MEQ/1
40 TABLET, EXTENDED RELEASE ORAL 2 TIMES DAILY WITH MEALS
Status: DISCONTINUED | OUTPATIENT
Start: 2018-01-13 | End: 2018-01-13 | Stop reason: HOSPADM

## 2018-01-13 RX ORDER — FUROSEMIDE 10 MG/ML
20 INJECTION INTRAMUSCULAR; INTRAVENOUS ONCE
Status: COMPLETED | OUTPATIENT
Start: 2018-01-13 | End: 2018-01-13

## 2018-01-13 RX ORDER — LEVALBUTEROL INHALATION SOLUTION 1.25 MG/3ML
1.25 SOLUTION RESPIRATORY (INHALATION) 4 TIMES DAILY
Status: DISCONTINUED | OUTPATIENT
Start: 2018-01-13 | End: 2018-01-13 | Stop reason: HOSPADM

## 2018-01-13 RX ORDER — LISINOPRIL 5 MG/1
5 TABLET ORAL DAILY
Qty: 30 TABLET | Refills: 1 | Status: SHIPPED | OUTPATIENT
Start: 2018-01-14 | End: 2020-08-18 | Stop reason: ALTCHOICE

## 2018-01-13 RX ORDER — AMIODARONE HYDROCHLORIDE 200 MG/1
200 TABLET ORAL DAILY
Qty: 30 TABLET | Refills: 0 | Status: SHIPPED | OUTPATIENT
Start: 2018-01-13 | End: 2019-10-31 | Stop reason: ALTCHOICE

## 2018-01-13 RX ORDER — LISINOPRIL 2.5 MG/1
2.5 TABLET ORAL DAILY
Status: DISCONTINUED | OUTPATIENT
Start: 2018-01-13 | End: 2018-01-13 | Stop reason: HOSPADM

## 2018-01-13 RX ADMIN — AMIODARONE HYDROCHLORIDE 200 MG: 200 TABLET ORAL at 08:08

## 2018-01-13 RX ADMIN — FUROSEMIDE 20 MG: 10 INJECTION, SOLUTION INTRAMUSCULAR; INTRAVENOUS at 13:49

## 2018-01-13 RX ADMIN — POTASSIUM CHLORIDE 40 MEQ: 1500 TABLET, EXTENDED RELEASE ORAL at 13:49

## 2018-01-13 RX ADMIN — LISINOPRIL 2.5 MG: 2.5 TABLET ORAL at 13:49

## 2018-01-13 RX ADMIN — ASPIRIN 81 MG: 81 TABLET, COATED ORAL at 08:08

## 2018-01-13 RX ADMIN — Medication 10 ML: at 08:10

## 2018-01-13 RX ADMIN — FUROSEMIDE 20 MG: 10 INJECTION, SOLUTION INTRAMUSCULAR; INTRAVENOUS at 01:37

## 2018-01-13 RX ADMIN — DABIGATRAN ETEXILATE MESYLATE 150 MG: 150 CAPSULE ORAL at 08:08

## 2018-01-13 RX ADMIN — PANTOPRAZOLE SODIUM 40 MG: 40 TABLET, DELAYED RELEASE ORAL at 08:09

## 2018-01-13 NOTE — PLAN OF CARE
Problem: Infection:  Goal: Will remain free from infection  Will remain free from infection   Outcome: Ongoing      Problem: Safety:  Goal: Free from accidental physical injury  Free from accidental physical injury   Outcome: Ongoing    Goal: Free from intentional harm  Free from intentional harm   Outcome: Ongoing      Problem: Daily Care:  Goal: Daily care needs are met  Daily care needs are met   Outcome: Ongoing      Problem: Skin Integrity:  Goal: Skin integrity will stabilize  Skin integrity will stabilize   Outcome: Ongoing      Problem: Discharge Planning:  Goal: Patients continuum of care needs are met  Patients continuum of care needs are met   Outcome: Ongoing

## 2018-01-13 NOTE — PROGRESS NOTES
Dr Ayala Betancourt called to clarify when figure eight suture may be removed. Order to remove four hours post sheath removal given.  Suture to be removed at 18:00
Dr. Jaziel Vaughan returned call.  [facility] Dr. Rachel Ontiveros states to restart amio 200 mg daily
Dr. Mortensen  called to d/c pt. Updated on pt's status. While speaking on phone pt HR increased to 115-123. A flutter noted on monitor. Dr. Mortensen  aware. Verbal order rec'd to get EKG and send via email. STates ok to restart pt's po amio if pt's HR remains elevated and hold lisinopril. States will keep pt another night.
Echo at bedside.
Patient admitted, consent signed, all questions answered. 12 Lead EKG obtained. Pt ready for procedure. Call light to reach with side rails up 2 of 2. Bilateral groins clipped.   at bedside with patient.
Patient converted back to NSR and HR currently in the 80s. Strip posted in patient's chart. Will continue to monitor.
Patient's heart rate jumped up to 130s-140s. EKG obtained and showed Supraventricular Tachycardia along with an incomplete Left Bundle Branch Block. Dr. Gavin Parker was sent a message via perfect serve and telephoned an order for Lopressor 5mg IV every 6 hours PRN tachycardia. He also ordered PO Amiodarone 200mg to be given at 2300. IV Lopressor was administered at [address]. Patient's heart rate dropped between 110s-120s. Dr. Gavin Parker was perfect served again regarding patient's HR. He stated that I go ahead and give her the Amiodarone. Patient was given 200mg Amiodarone PO at 2148.
Smoking Cessation - topics covered   []  Health Risks  []  Benefits of Quitting   []  Smoking Cessation  []  Patient has no history of tobacco use  [x]  Patient is former smoker. [x]  No need for tobacco cessation education. []  Booklet given  []  Patient verbalizes understanding. []  Patient denies need for tobacco cessation education.   Opal Armstrong  8:27 AM
Updated Dr. Chandler Loomis on patient condition and BP 85/48 MAP 57. States ok for now, if SBP drops below 80 notify cardio fellow to possibly start dopamine.
signed on 01/13/18 at 12:12 PM by:    Aldo Montgomery MD   Fellow, [address] [facility] [address] [facility] [address]

## 2018-01-13 NOTE — FLOWSHEET NOTE
Stopped to see pt while rounding on CAR 1. Pt was sitting in a chair talking to her son JONATHAN who was standing next to side wall of room when  entered the room. Pt accepted 's presence & told him that she had had an ablation procedure today, that docs were satisfied, & that she believes she will be released tomorrow. Pt is Buddhism and attends [address] [facility]. She said that the [facility] have brought her Communion more than once since she was admitted. She mentioned that her son \"Bib\" goes by \"TJ\" as does her grandson. Her  in [place]. Pt/ son/  had an upbeat conversation. Pt accepted 's offer of prayer. She & TJ joined  as he said the \"Our Father. \"  Tone Foley will remain available to offer spiritual and emotional support as needed. Rev. Tara Lozano, [address]     01/12/18 0876   Encounter Summary   Services provided to: Patient and family together  (Son Leann Arredondo in room w/ pt)   Referral/Consult From: [address]; Children;Family members   Place of [address]    Continue Visiting (1/12)   Volunteer Visit Yes   Complexity of Encounter Low   Spiritual Assessment Completed Yes   Routine   Type Initial   Assessment Calm; Approachable;Coping; Hopeful   Intervention Sustaining presence/ Ministry of presence; Active listening;Explored feelings, thoughts, concerns;Explored coping resources; Discussed illness/injury and it's impact;Nurtured hope;Prayer  (left Spiritual Care info)   Outcome Receptive; Acceptance; Coping; Hopeful;Encouraged;Engaged in conversation;Expressed gratitude   Spiritual/Temple   Type Spiritual support

## 2018-01-13 NOTE — DISCHARGE SUMMARY
(CORDARONE) 200 MG tablet  Take 1 tablet by mouth daily             aspirin 81 MG tablet  Take 81 mg by mouth daily. dabigatran (PRADAXA) 150 MG capsule  Take 1 capsule by mouth 2 times daily             lisinopril (PRINIVIL;ZESTRIL) 5 MG tablet  Take 1 tablet by mouth daily             metoprolol tartrate (LOPRESSOR) 25 MG tablet  Take 0.5 tablets by mouth 2 times daily             omeprazole (PRILOSEC OTC) 20 MG tablet  Take 1 tablet by mouth daily             simvastatin (ZOCOR) 40 MG tablet  TAKE 1 TABLET AT BEDTIME                    Discussed with patient and nursing. Medications and discharge instructions reviewed with patient and nursing. Patient counseled in detail regarding medication compliance and risk factor modification. Electronically signed by Jose Roberto Francis MD on 1/13/2018 at 2:26 PM  Fellow, [address] [place] [address]     Attending Physician Statement  I have discussed the care of Julieth Beavers, including pertinent history and exam findings,  with the resident. I have seen and examined the patient and the key elements of all parts of the encounter have been performed by me. I agree with the assessment, plan and orders as documented by the resident.

## 2018-01-13 NOTE — PROCEDURES
the 12Fr sheath were simultaneously removed while maintaining the wire in the LSPV. Next, the Flexcath sheath was delivered over the wire into the LA. The wire was removed and the [employer] II was inserted over an Achieve guidewire. After ensuring appropriate sealing following balloon inflation with minimal contrast reflux. Cryoablation was performed. Applications were delivered via a generator ( 9You ATAKAR II), in flow control mode. Two lesions were applied to the following locations: the ostium of the left superior pulmonary vein, the ostium of the left inferior pulmonary vein, the ostium of the right superior branch of right pulmonary vein and inferior branch of the right pulmonary vein. Right-sided lesions were applied during phrenic nerve pacing and CMAP monitoring. Isolation of all right pulmonary veins was demonstrated with circular catheter, the left pulmonary veins were found to have gaps in the inferior aspect of the LIPV. Therefore an ablation cathter was used to \"touch-up\" gaps in the cryo ablation lines. RF ablation was done using Tacti Cath and Agilis sheath. Ablation line was done in the inferior part of the LIPV and  Anteriorly along the ridge area all the way to roof adjacent to the LSPV. Further ablation points were done in the right superior branch for consolidation. Following PV isolation there was evidence of exit block. EP study was done subsequently which showed normal intervals with YO=449, HV=43 and VFDDI=854 ms, no dual AV node physiology    Lesions:   Radiofrequency lesion summary: variable number of applications of variable duration were delivered. Cryoablation lesion summary: 8 applications of variable duration were delivered. Complications:   No complications. No complications. Discharge and follow-up:   1. The patient left the EP laboratory in stable condition.    The patient will remain in the hospital overnight for observation and rest, with anticipated discharge on the following day. The patient has been instructed accordingly. Summary:   Successful cryoballon & RF ablation for atrial fibrillation with isolation of all pulmonary veins.      Recommendations:   -- Admit to Step down/ ICU  -- Bed rest for 4 hrs.   -- Anticoagulation 4 hrs after sheaths are out  -- Remove Figure 8 sutures in 4 hours if no hematoma or bleeding is noted  -- Protonix 40 mg bid for 4 weeks

## 2018-01-13 NOTE — CARE COORDINATION
Discharge [id number] [facility]  Clinical Case Management Department  Written by: Lissette Dunaway RN    Patient Name: Yoselin Chairez  Attending Provider: No att. providers found  Admit Date: 2018  6:33 PM  MRN: [id number]  Account: [de-identified]                     : 1945  Discharge Date: 2018      Disposition: home    Lissette Dunaway RN

## 2018-01-16 NOTE — PROCEDURES
[address] [facility] [address], [address]                                  HOLTER MONITOR    PATIENT NAME: Gilberto Villalba                     :        1945  MED REC NO:   [id number]                             ROOM:       1026  ACCOUNT NO:   [de-identified]                           ADMIT DATE: 2018  PROVIDER:     Jaiden Tadeo    HOLTER MONITOR 48-HOURS    DATE OF STUDY:  2018  INDICATION:  Irregular HR  ORDERINE PHYSICIAN:  SASHA Mae    COMMENTS:  The patient appeared to remain in sinus rhythm throughout the  recording with sinus bradycardia noted. Rare PAC's with pairs were noted. The patient events were noted. IMPRESSION:  1. Sinus rhythm with sinus bradycardia. 2.  Rare PAC's with pairs.       Prudence Hu    D: 2018 14:01:30       T: 2018 14:02:16     FRANCISCO J/BLAINE  Job#: [id number]    Doc#: Unknown

## 2018-01-24 LAB
DIRECT EXAM: NORMAL
DIRECT EXAM: NORMAL
Lab: NORMAL
SPECIMEN DESCRIPTION: NORMAL
STATUS: NORMAL

## 2018-04-23 RX ORDER — SIMVASTATIN 40 MG
TABLET ORAL
Qty: 90 TABLET | Refills: 0 | Status: SHIPPED | OUTPATIENT
Start: 2018-04-23 | End: 2018-06-19 | Stop reason: SDUPTHER

## 2018-06-19 ENCOUNTER — HOSPITAL ENCOUNTER (OUTPATIENT)
Dept: GENERAL RADIOLOGY | Age: 73
Discharge: HOME | End: 2018-06-21
Payer: MEDICARE

## 2018-06-19 ENCOUNTER — HOSPITAL ENCOUNTER (OUTPATIENT)
Age: 73
Discharge: HOME | End: 2018-06-21
Payer: MEDICARE

## 2018-06-19 ENCOUNTER — OFFICE VISIT (OUTPATIENT)
Dept: INTERNAL MEDICINE | Age: 73
End: 2018-06-19
Payer: MEDICARE

## 2018-06-19 VITALS
SYSTOLIC BLOOD PRESSURE: 135 MMHG | HEIGHT: 64 IN | BODY MASS INDEX: 32.61 KG/M2 | DIASTOLIC BLOOD PRESSURE: 74 MMHG | WEIGHT: 191 LBS | OXYGEN SATURATION: 95 % | HEART RATE: 60 BPM

## 2018-06-19 DIAGNOSIS — M54.2 NECK PAIN: Primary | ICD-10-CM

## 2018-06-19 DIAGNOSIS — M54.2 NECK PAIN: ICD-10-CM

## 2018-06-19 DIAGNOSIS — Z12.11 COLON CANCER SCREENING: ICD-10-CM

## 2018-06-19 DIAGNOSIS — L30.9 DERMATITIS: ICD-10-CM

## 2018-06-19 PROCEDURE — 99214 OFFICE O/P EST MOD 30 MIN: CPT | Performed by: INTERNAL MEDICINE

## 2018-06-19 PROCEDURE — 72040 X-RAY EXAM NECK SPINE 2-3 VW: CPT

## 2018-06-19 RX ORDER — CYCLOBENZAPRINE HCL 5 MG
5 TABLET ORAL 3 TIMES DAILY PRN
Qty: 60 TABLET | Refills: 1 | Status: SHIPPED | OUTPATIENT
Start: 2018-06-19 | End: 2021-09-21

## 2018-06-19 ASSESSMENT — ENCOUNTER SYMPTOMS
CHOKING: 1
TROUBLE SWALLOWING: 1
EYES NEGATIVE: 1

## 2018-07-30 RX ORDER — SIMVASTATIN 40 MG
TABLET ORAL
Qty: 90 TABLET | Refills: 3 | Status: SHIPPED | OUTPATIENT
Start: 2018-07-30 | End: 2019-10-31 | Stop reason: SDUPTHER

## 2018-10-08 ENCOUNTER — OFFICE VISIT (OUTPATIENT)
Dept: INTERNAL MEDICINE | Age: 73
End: 2018-10-08
Payer: MEDICARE

## 2018-10-08 VITALS
HEART RATE: 65 BPM | OXYGEN SATURATION: 95 % | BODY MASS INDEX: 31.93 KG/M2 | SYSTOLIC BLOOD PRESSURE: 107 MMHG | DIASTOLIC BLOOD PRESSURE: 69 MMHG | WEIGHT: 186 LBS

## 2018-10-08 DIAGNOSIS — R22.32 LOCALIZED SWELLING ON LEFT HAND: Primary | ICD-10-CM

## 2018-10-08 DIAGNOSIS — Z23 NEED FOR PROPHYLACTIC VACCINATION AND INOCULATION AGAINST VARICELLA: ICD-10-CM

## 2018-10-08 DIAGNOSIS — L65.9 ALOPECIA: ICD-10-CM

## 2018-10-08 DIAGNOSIS — Z12.11 SCREENING FOR COLON CANCER: ICD-10-CM

## 2018-10-08 PROCEDURE — 99214 OFFICE O/P EST MOD 30 MIN: CPT | Performed by: INTERNAL MEDICINE

## 2018-10-08 RX ORDER — CLINDAMYCIN HYDROCHLORIDE 150 MG/1
150 CAPSULE ORAL 4 TIMES DAILY
Qty: 28 CAPSULE | Refills: 0 | Status: SHIPPED | OUTPATIENT
Start: 2018-10-08 | End: 2018-10-15

## 2018-10-08 RX ORDER — CLINDAMYCIN HYDROCHLORIDE 150 MG/1
CAPSULE ORAL
Refills: 0 | COMMUNITY
Start: 2018-07-27 | End: 2019-10-31 | Stop reason: ALTCHOICE

## 2018-10-08 ASSESSMENT — ENCOUNTER SYMPTOMS
EYES NEGATIVE: 1
RESPIRATORY NEGATIVE: 1
ALLERGIC/IMMUNOLOGIC NEGATIVE: 1
COLOR CHANGE: 1
VOMITING: 1

## 2018-10-08 ASSESSMENT — PATIENT HEALTH QUESTIONNAIRE - PHQ9
SUM OF ALL RESPONSES TO PHQ QUESTIONS 1-9: 2
SUM OF ALL RESPONSES TO PHQ QUESTIONS 1-9: 2
SUM OF ALL RESPONSES TO PHQ9 QUESTIONS 1 & 2: 2
1. LITTLE INTEREST OR PLEASURE IN DOING THINGS: 1
2. FEELING DOWN, DEPRESSED OR HOPELESS: 1

## 2018-10-25 ENCOUNTER — HOSPITAL ENCOUNTER (OUTPATIENT)
Age: 73
Discharge: HOME | End: 2018-10-25
Payer: MEDICARE

## 2018-10-25 DIAGNOSIS — L65.9 ALOPECIA: ICD-10-CM

## 2018-10-25 LAB — TSH SERPL DL<=0.05 MIU/L-ACNC: 2.37 MIU/L (ref 0.3–5)

## 2018-10-25 PROCEDURE — 36415 COLL VENOUS BLD VENIPUNCTURE: CPT

## 2018-10-25 PROCEDURE — 84443 ASSAY THYROID STIM HORMONE: CPT

## 2019-01-16 NOTE — PLAN OF CARE
Problem: SAFETY  Goal: Free from intentional harm  Outcome: Ongoing all other ROS negative except as per HPI

## 2019-02-15 ENCOUNTER — TELEPHONE (OUTPATIENT)
Dept: PRIMARY CARE CLINIC | Age: 74
End: 2019-02-15

## 2019-03-15 NOTE — PROGRESS NOTES
Patient and wife informed and Dr. Ibarra appointment scheduled for 3/27/19   EK2017 3:53 PM  Atrial fibrillation with RVR, diffuse st t changes suggesting ischemia         2017 4:35 am   SR, LAE        Previous cardiac testing:      STRESS 17: No ischemia or infarct. EF 65%.      TTE 16: EF 75%. Mild LVH.      CATH 10/31/11: No significant disease with aneurysmal dilation in mid RCA- EF 55%. Assessment / Acute Cardiac Problems:   1. Syncope, cardiogenic from sick sinus syndrome with longest sinus pause > 5 seconds  2. Atrial fibrillation, Paroxysmal, presented with RVR, currently in SR, on long term anticoagulation   3. Hypertension   4. GERD       Plan of Treatment:   Decrease lopressor to 25 mg BID  Continue Flecainide   On anticoagulation with pradaxa   Holter on discharge   OK to d/c from cardiology perspective with OP follow up with Dr Cindy Eddy in 2-4 weeks to consider Ablation for PVI         Hay Patel MD  Fellow, [address]   Pager - [phone]    Attending Cardiologist Addendum: I have reviewed and performed the history, physical, subjective, objective, assessment, and plan with the resident/fellow and agree with the note. I performed the history and physical personally. I have made changes to the note above as needed. Thank you for allowing me to participate in the care of this patient, please do not hesitate to call if you have any questions.     Travis Mcneill MD MSc. [facility]  [phone]

## 2019-10-31 ENCOUNTER — OFFICE VISIT (OUTPATIENT)
Dept: PRIMARY CARE CLINIC | Age: 74
End: 2019-10-31
Payer: MEDICARE

## 2019-10-31 VITALS
HEIGHT: 65 IN | WEIGHT: 189 LBS | HEART RATE: 69 BPM | TEMPERATURE: 97.7 F | OXYGEN SATURATION: 97 % | DIASTOLIC BLOOD PRESSURE: 84 MMHG | SYSTOLIC BLOOD PRESSURE: 141 MMHG | BODY MASS INDEX: 31.49 KG/M2

## 2019-10-31 DIAGNOSIS — Z13.220 SCREENING FOR HYPERLIPIDEMIA: ICD-10-CM

## 2019-10-31 DIAGNOSIS — I48.0 PAROXYSMAL ATRIAL FIBRILLATION (HCC): ICD-10-CM

## 2019-10-31 DIAGNOSIS — I10 ESSENTIAL HYPERTENSION: Primary | ICD-10-CM

## 2019-10-31 DIAGNOSIS — Z23 NEED FOR PROPHYLACTIC VACCINATION AND INOCULATION AGAINST VARICELLA: ICD-10-CM

## 2019-10-31 PROCEDURE — 99214 OFFICE O/P EST MOD 30 MIN: CPT | Performed by: NURSE PRACTITIONER

## 2019-10-31 RX ORDER — SIMVASTATIN 40 MG
TABLET ORAL
Qty: 90 TABLET | Refills: 2 | Status: SHIPPED | OUTPATIENT
Start: 2019-10-31 | End: 2020-07-27

## 2019-10-31 ASSESSMENT — ENCOUNTER SYMPTOMS
SHORTNESS OF BREATH: 0
VOMITING: 0
ABDOMINAL PAIN: 0
BLOOD IN STOOL: 0
DIARRHEA: 1
WHEEZING: 0
TROUBLE SWALLOWING: 0

## 2019-10-31 ASSESSMENT — PATIENT HEALTH QUESTIONNAIRE - PHQ9
2. FEELING DOWN, DEPRESSED OR HOPELESS: 0
SUM OF ALL RESPONSES TO PHQ QUESTIONS 1-9: 0
1. LITTLE INTEREST OR PLEASURE IN DOING THINGS: 0
SUM OF ALL RESPONSES TO PHQ9 QUESTIONS 1 & 2: 0
SUM OF ALL RESPONSES TO PHQ QUESTIONS 1-9: 0

## 2019-11-07 DIAGNOSIS — Z12.11 COLON CANCER SCREENING: Primary | ICD-10-CM

## 2019-11-07 LAB
CONTROL: PRESENT
HEMOCCULT STL QL: POSITIVE

## 2019-11-07 PROCEDURE — 82274 ASSAY TEST FOR BLOOD FECAL: CPT | Performed by: NURSE PRACTITIONER

## 2019-11-12 ENCOUNTER — TELEPHONE (OUTPATIENT)
Dept: PRIMARY CARE CLINIC | Age: 74
End: 2019-11-12

## 2019-11-12 DIAGNOSIS — R19.5 POSITIVE FIT (FECAL IMMUNOCHEMICAL TEST): Primary | ICD-10-CM

## 2019-11-13 ENCOUNTER — TELEPHONE (OUTPATIENT)
Dept: GASTROENTEROLOGY | Age: 74
End: 2019-11-13

## 2019-12-10 ENCOUNTER — OFFICE VISIT (OUTPATIENT)
Dept: GASTROENTEROLOGY | Age: 74
End: 2019-12-10
Payer: MEDICARE

## 2019-12-10 VITALS
WEIGHT: 192 LBS | HEART RATE: 72 BPM | SYSTOLIC BLOOD PRESSURE: 137 MMHG | BODY MASS INDEX: 32.45 KG/M2 | DIASTOLIC BLOOD PRESSURE: 72 MMHG

## 2019-12-10 DIAGNOSIS — R19.5 POSITIVE FIT (FECAL IMMUNOCHEMICAL TEST): Primary | ICD-10-CM

## 2019-12-10 PROCEDURE — 99204 OFFICE O/P NEW MOD 45 MIN: CPT | Performed by: INTERNAL MEDICINE

## 2019-12-10 RX ORDER — SODIUM, POTASSIUM,MAG SULFATES 17.5-3.13G
1 SOLUTION, RECONSTITUTED, ORAL ORAL ONCE
Qty: 1 BOTTLE | Refills: 0 | Status: SHIPPED | OUTPATIENT
Start: 2019-12-10 | End: 2019-12-10

## 2019-12-10 ASSESSMENT — ENCOUNTER SYMPTOMS
COUGH: 1
BLOOD IN STOOL: 1
DIARRHEA: 1
TROUBLE SWALLOWING: 1
ABDOMINAL PAIN: 1
ALLERGIC/IMMUNOLOGIC NEGATIVE: 1
EYES NEGATIVE: 1
BACK PAIN: 1

## 2019-12-27 ENCOUNTER — TELEPHONE (OUTPATIENT)
Dept: GASTROENTEROLOGY | Age: 74
End: 2019-12-27

## 2020-01-02 LAB
A/G RATIO: NORMAL
ALBUMIN SERPL-MCNC: NORMAL G/DL
ALP BLD-CCNC: NORMAL U/L
ALT SERPL-CCNC: NORMAL U/L
AST SERPL-CCNC: NORMAL U/L
BASOPHILS ABSOLUTE: NORMAL
BASOPHILS RELATIVE PERCENT: NORMAL
BILIRUB SERPL-MCNC: NORMAL MG/DL
BILIRUBIN DIRECT: NORMAL
BILIRUBIN, INDIRECT: NORMAL
BUN BLDV-MCNC: NORMAL MG/DL
CALCIUM SERPL-MCNC: NORMAL MG/DL
CHLORIDE BLD-SCNC: NORMAL MMOL/L
CO2: NORMAL
CREAT SERPL-MCNC: NORMAL MG/DL
EOSINOPHILS ABSOLUTE: NORMAL
EOSINOPHILS RELATIVE PERCENT: NORMAL
GFR CALCULATED: NORMAL
GLOBULIN: NORMAL
GLUCOSE BLD-MCNC: NORMAL MG/DL
HCT VFR BLD CALC: NORMAL %
HEMOGLOBIN: NORMAL
LYMPHOCYTES ABSOLUTE: NORMAL
LYMPHOCYTES RELATIVE PERCENT: NORMAL
MCH RBC QN AUTO: NORMAL PG
MCHC RBC AUTO-ENTMCNC: NORMAL G/DL
MCV RBC AUTO: NORMAL FL
MONOCYTES ABSOLUTE: NORMAL
MONOCYTES RELATIVE PERCENT: NORMAL
NEUTROPHILS ABSOLUTE: NORMAL
NEUTROPHILS RELATIVE PERCENT: NORMAL
PDW BLD-RTO: NORMAL %
PLATELET # BLD: NORMAL 10*3/UL
PMV BLD AUTO: NORMAL FL
POTASSIUM SERPL-SCNC: NORMAL MMOL/L
PROTEIN TOTAL: NORMAL
RBC # BLD: NORMAL 10*6/UL
SODIUM BLD-SCNC: NORMAL MMOL/L
WBC # BLD: NORMAL 10*3/UL

## 2020-01-10 ENCOUNTER — ANESTHESIA (OUTPATIENT)
Dept: ENDOSCOPY | Age: 75
End: 2020-01-10
Payer: MEDICARE

## 2020-01-10 ENCOUNTER — ANESTHESIA EVENT (OUTPATIENT)
Dept: ENDOSCOPY | Age: 75
End: 2020-01-10
Payer: MEDICARE

## 2020-01-10 ENCOUNTER — HOSPITAL ENCOUNTER (OUTPATIENT)
Age: 75
Setting detail: OUTPATIENT SURGERY
Discharge: HOME | End: 2020-01-10
Attending: INTERNAL MEDICINE | Admitting: INTERNAL MEDICINE
Payer: MEDICARE

## 2020-01-10 VITALS
BODY MASS INDEX: 31.24 KG/M2 | OXYGEN SATURATION: 99 % | WEIGHT: 183 LBS | HEIGHT: 64 IN | DIASTOLIC BLOOD PRESSURE: 63 MMHG | HEART RATE: 62 BPM | RESPIRATION RATE: 19 BRPM | TEMPERATURE: 99.1 F | SYSTOLIC BLOOD PRESSURE: 128 MMHG

## 2020-01-10 VITALS
OXYGEN SATURATION: 98 % | SYSTOLIC BLOOD PRESSURE: 93 MMHG | RESPIRATION RATE: 20 BRPM | DIASTOLIC BLOOD PRESSURE: 49 MMHG

## 2020-01-10 PROCEDURE — 2500000003 HC RX 250 WO HCPCS: Performed by: NURSE ANESTHETIST, CERTIFIED REGISTERED

## 2020-01-10 PROCEDURE — 3700000000 HC ANESTHESIA ATTENDED CARE: Performed by: INTERNAL MEDICINE

## 2020-01-10 PROCEDURE — 2580000003 HC RX 258: Performed by: INTERNAL MEDICINE

## 2020-01-10 PROCEDURE — 43239 EGD BIOPSY SINGLE/MULTIPLE: CPT | Performed by: INTERNAL MEDICINE

## 2020-01-10 PROCEDURE — 3609012400 HC EGD TRANSORAL BIOPSY SINGLE/MULTIPLE: Performed by: INTERNAL MEDICINE

## 2020-01-10 PROCEDURE — 6360000002 HC RX W HCPCS: Performed by: NURSE ANESTHETIST, CERTIFIED REGISTERED

## 2020-01-10 PROCEDURE — 2709999900 HC NON-CHARGEABLE SUPPLY: Performed by: INTERNAL MEDICINE

## 2020-01-10 PROCEDURE — 7100000011 HC PHASE II RECOVERY - ADDTL 15 MIN: Performed by: INTERNAL MEDICINE

## 2020-01-10 PROCEDURE — 3609010300 HC COLONOSCOPY W/BIOPSY SINGLE/MULTIPLE: Performed by: INTERNAL MEDICINE

## 2020-01-10 PROCEDURE — 88305 TISSUE EXAM BY PATHOLOGIST: CPT

## 2020-01-10 PROCEDURE — 7100000010 HC PHASE II RECOVERY - FIRST 15 MIN: Performed by: INTERNAL MEDICINE

## 2020-01-10 PROCEDURE — 3700000001 HC ADD 15 MINUTES (ANESTHESIA): Performed by: INTERNAL MEDICINE

## 2020-01-10 PROCEDURE — 45380 COLONOSCOPY AND BIOPSY: CPT | Performed by: INTERNAL MEDICINE

## 2020-01-10 RX ORDER — ONDANSETRON 2 MG/ML
INJECTION INTRAMUSCULAR; INTRAVENOUS PRN
Status: DISCONTINUED | OUTPATIENT
Start: 2020-01-10 | End: 2020-01-10 | Stop reason: SDUPTHER

## 2020-01-10 RX ORDER — SODIUM CHLORIDE 9 MG/ML
INJECTION, SOLUTION INTRAVENOUS ONCE
Status: COMPLETED | OUTPATIENT
Start: 2020-01-10 | End: 2020-01-10

## 2020-01-10 RX ORDER — LIDOCAINE HYDROCHLORIDE 10 MG/ML
INJECTION, SOLUTION EPIDURAL; INFILTRATION; INTRACAUDAL; PERINEURAL PRN
Status: DISCONTINUED | OUTPATIENT
Start: 2020-01-10 | End: 2020-01-10 | Stop reason: SDUPTHER

## 2020-01-10 RX ORDER — PROPOFOL 10 MG/ML
INJECTION, EMULSION INTRAVENOUS PRN
Status: DISCONTINUED | OUTPATIENT
Start: 2020-01-10 | End: 2020-01-10 | Stop reason: SDUPTHER

## 2020-01-10 RX ADMIN — SODIUM CHLORIDE: 9 INJECTION, SOLUTION INTRAVENOUS at 12:02

## 2020-01-10 RX ADMIN — PROPOFOL 400 MG: 10 INJECTION, EMULSION INTRAVENOUS at 12:06

## 2020-01-10 RX ADMIN — LIDOCAINE HYDROCHLORIDE 50 MG: 10 INJECTION, SOLUTION EPIDURAL; INFILTRATION; INTRACAUDAL at 12:06

## 2020-01-10 RX ADMIN — ONDANSETRON 4 MG: 2 INJECTION INTRAMUSCULAR; INTRAVENOUS at 12:48

## 2020-01-10 ASSESSMENT — PAIN SCALES - GENERAL
PAINLEVEL_OUTOF10: 0

## 2020-01-10 ASSESSMENT — PAIN - FUNCTIONAL ASSESSMENT: PAIN_FUNCTIONAL_ASSESSMENT: 0-10

## 2020-01-10 NOTE — OP NOTE
[facility]    [facility] ENDOSCOPY     EGD    PROCEDURE DATE: 01/10/20    REFERRING PHYSICIAN: No ref. provider found     PRIMARY CARE PROVIDER: JUDAH Gomez - CNP    ATTENDING PHYSICIAN: Matthew Hill MD     HISTORY: Ms. Romero Hilton is a 66 y.o. female who presents to the [facility] Endoscopy unit for upper endoscopy. The patient's clinical history is remarkable for A-fib, on DOAC, GERD, positive FIT testing, remote history of melena, referred for EGD and Colonoscpoy. She is currently medically stable and appropriate for the planned procedure. PREOPERATIVE DIAGNOSIS: Suspected UGI bleed . PROCEDURES:   1) Transoral Upper Endoscopy with cold biopsy of the stomach and esophagus     POSTOPERATIVE DIAGNOSIS:     Small 2 cm area of salmon appearing mucosa with a slightly irregular Z-line to suggest Guerrero's esophagus (Short segment), biopsy taken for confirmation  Mild antritis, non-specific, cold biopsy taken for H. Pylori testing  No ulcerations, no erosions seen. MEDICATIONS:   MAC per anesthesia     EBL:  <10cc    INSTRUMENT: Olympus GIF-H180  flexible Gastroscope. PREPARATION: The nature and character of the procedure as well as risks, benefits, and alternatives were discussed with the patient and informed consent was obtained. Complications were said to include, but were not limited to: medication allergy, medication reaction, cardiovascular and respiratory problems, bleeding, perforation, infection, and/or missed diagnosis. Following arrival in the endoscopy room, the patient was placed in the left lateral decubitus position and final time-out accomplished in the presence of the nursing staff. Baseline vital signs were obtained and reviewed, and IV sedation was subsequently initiated. FINDINGS:   Esophagus: The esophagus was inspected to the Z-line. The endoscopic exam showed an area of salmon appearing mucosa in the distal esophagus, suggestive of Guerrero's esophagus. concerning lesions identified     MEDICATIONS:     MAC per anesthesia     EBL <10cc    INSTRUMENT: Olympus CF-H180 AL Pediatric flexible Colonoscope. PREPARATION: The nature and character of the procedure as well as risks, benefits, and alternatives were discussed with the patient and informed consent was obtained. Complications were said to include, but were not limited to: medication allergy, medication reaction, cardiovascular and respiratory problems, bleeding, perforation, infection, and/or missed diagnosis. Following arrival in the endoscopy room, the patient was placed in the left lateral decubitus position and final time-out accomplished in the presence of the nursing staff. Baseline vital signs were obtained and reviewed, and IV sedation was subsequently initiated. FINDINGS: Rectal examination demonstrated no significant visible external abnormality and digital palpation was unremarkable. Following adequate conscious sedation the colonoscope was introduced and advanced under direct visualization to the cecum, which was identified by the ileocecal valve and appendiceal orifice. The bowel preparation was felt to be FAIR. This included moderate amounts of green stool that was mostly able to be adequately irrigated and aspirated. Cecal intubation time was 9 minutes. Once maximally inserted, the endoscope was withdrawn and the mucosa was carefully inspected. The mucosal exam was revealed moderate left sided diverticulosis, small polyps in the ascending colon, hepatic flexure, and transverse colon (described below). Retroflexion was performed in the rectum and small internal hemorrhoids. Withdrawal time was 23 minutes.        IMPRESSION:     FAIR PREP    1) Moderate left sided diverticulosis  2) Ascending colon polyp, 5mm, s/p cold biopsy and removal  3) Hepatic flexure polyps (2 separate, 5mm each) s/p cold biopsy and removal  4) Transverse colon polyps (4 separate, small, 3-5mm each) s/p cold

## 2020-01-10 NOTE — ANESTHESIA PRE PROCEDURE
2011 Z98.890    Fluid overload E87.70    Syncope R55    Dermatitis L30.9    Localized swelling on left hand R22.32       Past Medical History:        Diagnosis Date    Aneurysm of abdominal aorta (HCC)     Atrial fibrillation (HCC)     Cancer (HCC)     breast    ETD (eustachian tube dysfunction)     Facial asymmetries     Fracture, ankle     Gastric ulcer     History of radiation therapy     right breast cancer    Hx of cardiac cath     Hx of gastroenteritis hx of noninfective gastroenteritis and colitis    Hyperlipidemia     Hypertension     IBS (irritable bowel syndrome)     Jaw pain, non-TMJ     Mammogram abnormal     Mitral valve prolapse syndrome     Pregnancy, incidental        Past Surgical History:        Procedure Laterality Date    APPENDECTOMY      BREAST LUMPECTOMY Right     COLONOSCOPY  Complete Colonoscopy for Polyp Removal    x2    HYSTERECTOMY      OTHER SURGICAL HISTORY  01/11/2018    A-Fib Ablation with Dr. Misty Panda TRANSESOPHAGEAL ECHOCARDIOGRAM  01/11/2018    with Dr. Jhonatan Guadalupe History:    Social History     Tobacco Use    Smoking status: Former Smoker     Packs/day: 1.00     Years: 10.00     Pack years: 10.00     Types: Cigarettes     Last attempt to quit: 6/19/2009     Years since quitting: 10.5    Smokeless tobacco: Never Used   Substance Use Topics    Alcohol use: Yes     Comment: occ                                Counseling given: Not Answered      Vital Signs (Current):   Vitals:    01/10/20 1036   BP: (!) 150/64   Pulse: 61   Resp: 19   Temp: 36 °C (96.8 °F)   TempSrc: Infrared   SpO2: 95%   Weight: 183 lb (83 kg)   Height: 5' 4\" (1.626 m)                                              BP Readings from Last 3 Encounters:   01/10/20 (!) 150/64   12/10/19 137/72   10/31/19 (!) 141/84       NPO Status: Time of last liquid consumption: 0830(sips with meds)                        Time of last solid consumption: 1800                        Date of last liquid consumption: 01/10/20                        Date of last solid food consumption: 01/08/20    BMI:   Wt Readings from Last 3 Encounters:   01/10/20 183 lb (83 kg)   12/10/19 192 lb (87.1 kg)   10/31/19 189 lb (85.7 kg)     Body mass index is 31.41 kg/m². CBC:   Lab Results   Component Value Date    WBC 10.0 01/13/2018    RBC 3.68 01/13/2018    RBC 4.67 10/29/2011    HGB 11.5 01/13/2018    HCT 35.9 01/13/2018    MCV 97.6 01/13/2018    RDW 12.4 01/13/2018     01/13/2018     10/29/2011       CMP:   Lab Results   Component Value Date     01/13/2018    K 3.8 01/13/2018     01/13/2018    CO2 24 01/13/2018    BUN 7 01/13/2018    CREATININE 0.75 01/13/2018    GFRAA >60 01/13/2018    LABGLOM >60 01/13/2018    GLUCOSE 94 01/13/2018    GLUCOSE 98 10/29/2011    PROT 5.9 06/23/2016    CALCIUM 8.2 01/13/2018    BILITOT 0.33 06/23/2016    ALKPHOS 47 06/23/2016    AST 12 06/23/2016    ALT 7 06/23/2016       POC Tests: No results for input(s): POCGLU, POCNA, POCK, POCCL, POCBUN, POCHEMO, POCHCT in the last 72 hours.     Coags:   Lab Results   Component Value Date    PROTIME 11.4 12/13/2017    PROTIME 9.8 10/29/2011    INR 1.1 12/13/2017    APTT 25.9 12/18/2017       HCG (If Applicable): No results found for: PREGTESTUR, PREGSERUM, HCG, HCGQUANT     ABGs: No results found for: PHART, PO2ART, QYW0LYB, IJI6CZQ, BEART, X5QDEUFT     Type & Screen (If Applicable):  No results found for: LABABO, [address]    Anesthesia Evaluation  Patient summary reviewed and Nursing notes reviewed  Airway: Mallampati: II  TM distance: >3 FB   Neck ROM: limited  Mouth opening: > = 3 FB Dental:          Pulmonary:Negative Pulmonary ROS and normal exam                               Cardiovascular:    (+) hypertension:, dysrhythmias: atrial fibrillation, hyperlipidemia         Beta Blocker:  Dose within 24 Hrs

## 2020-01-14 LAB — SURGICAL PATHOLOGY REPORT: NORMAL

## 2020-01-30 ENCOUNTER — OFFICE VISIT (OUTPATIENT)
Dept: GASTROENTEROLOGY | Age: 75
End: 2020-01-30
Payer: MEDICARE

## 2020-01-30 VITALS
WEIGHT: 185 LBS | BODY MASS INDEX: 31.76 KG/M2 | SYSTOLIC BLOOD PRESSURE: 127 MMHG | DIASTOLIC BLOOD PRESSURE: 71 MMHG | OXYGEN SATURATION: 96 % | HEART RATE: 85 BPM

## 2020-01-30 PROCEDURE — 99214 OFFICE O/P EST MOD 30 MIN: CPT | Performed by: INTERNAL MEDICINE

## 2020-01-30 ASSESSMENT — ENCOUNTER SYMPTOMS
RESPIRATORY NEGATIVE: 1
EYE REDNESS: 0
EYE DISCHARGE: 0
EYE PAIN: 0
EYE ITCHING: 0

## 2020-01-30 NOTE — PROGRESS NOTES
GI CLINIC FOLLOW UP    INTERVAL HISTORY:   No referring provider defined for this encounter. Chief Complaint   Patient presents with    EGD     2 month f/u EGD, Colon,Labs    GI Problem    Discuss Labs           HISTORY OF PRESENT ILLNESS: Ms.Mary Meeta Salvador is a 66 y.o. female with a pasthistory remarkable for HTN, HL, A-fib s/p ablation, on Pradaxa AAA, referred for evaluation of positive FIT test. Prior Colonoscopy ~20 yrs ago, reported history polyps, follow up colonoscopy was negative (per patient).      Hysterectomy   Appendectomy in past  Non-smoker  Non-drinker  Reported isolated episode of melena in past, none recent  No recent NSAID use   No AP/AC therapy    Past Medical,Family, and Social History reviewed and does contribute to the patient presenting condition. Patient's PMH/PSH,SH,PSYCH Hx, MEDs, ALLERGIES, and ROS were all reviewed and updated in the appropriate sections.     PAST MEDICAL HISTORY:  Past Medical History:   Diagnosis Date    Aneurysm of abdominal aorta (HCC)     Atrial fibrillation (HCC)     Cancer (HCC)     breast    ETD (eustachian tube dysfunction)     Facial asymmetries     Fracture, ankle     Gastric ulcer     History of radiation therapy     right breast cancer    Hx of cardiac cath     Hx of gastroenteritis hx of noninfective gastroenteritis and colitis    Hyperlipidemia     Hypertension     IBS (irritable bowel syndrome)     Jaw pain, non-TMJ     Mammogram abnormal     Mitral valve prolapse syndrome     Pregnancy, incidental        Past Surgical History:   Procedure Laterality Date    APPENDECTOMY      BREAST LUMPECTOMY Right     COLONOSCOPY  Complete Colonoscopy for Polyp Removal    x2    COLONOSCOPY N/A 1/10/2020    COLONOSCOPY WITH BIOPSY performed by Mary Hendrickson MD at [address]. 00     Types: Cigarettes     Last attempt to quit: 6/19/2009     Years since quitting: 10.6    Smokeless tobacco: Never Used   Substance and Sexual Activity    Alcohol use: Yes     Comment: occ    Drug use: Never    Sexual activity: Not on file   Lifestyle    Physical activity:     Days per week: Not on file     Minutes per session: Not on file    Stress: Not on file   Relationships    Social connections:     Talks on phone: Not on file     Gets together: Not on file     Attends Yazdanism service: Not on file     Active member of club or organization: Not on file     Attends meetings of clubs or organizations: Not on file     Relationship status: Not on file    Intimate partner violence:     Fear of current or ex partner: Not on file     Emotionally abused: Not on file     Physically abused: Not on file     Forced sexual activity: Not on file   Other Topics Concern    Not on file   Social History Narrative    Not on file       REVIEW OF SYSTEMS: A 12-point review of systems was obtained and pertinent positives and negatives were listed below. REVIEW OF SYSTEMS:     Constitutional: No fever, no chills, no lethargy, no weakness. HEENT:  No headache, otalgia, itchy eyes, nasal discharge or sore throat. Cardiac:  No chest pain, dyspnea, orthopnea or PND. Chest:   No cough, phlegm or wheezing. Abdomen:      Detailed by MA   Neuro:  No focal weakness, abnormal movements or seizure like activity. Skin:   No rashes, no itching. :   No hematuria, no pyuria, no dysuria, no flank pain. Extremities:  No swelling or joint pains. ROS was otherwise negative    Review of Systems    PHYSICAL EXAMINATION: Vital signs reviewed per the nursing documentation. /71 (Site: Left Upper Arm, Position: Sitting, Cuff Size: Large Adult)   Pulse 85   Wt 185 lb (83.9 kg)   SpO2 96%   Breastfeeding Yes   BMI 31.76 kg/m²   Body mass index is 31.76 kg/m².    Physical Exam    Physical Exam   Constitutional: Patient is oriented to person, place, and time. Patient appears well-developed and well-nourished. HENT:   Head: Normocephalic and atraumatic. Eyes: Pupils are equal, round, and reactive to light. EOM are normal.   Neck: Normal range of motion. Neck supple. No JVD present. No tracheal deviation present. No thyromegaly present. Cardiovascular: Normal rate, regular rhythm, normal heart sounds and intact distal pulses. Pulmonary/Chest: Effort normal and breath sounds normal. No stridor. No respiratory distress. He has no wheezes. He has no rales. He exhibits no tenderness. Abdominal: Soft. Bowel sounds are normal. He exhibits no distension and no mass. There is no tenderness. There is no rebound and no guarding. No hernia. Musculoskeletal: Normal range of motion. Lymphadenopathy:    Patient has no cervical adenopathy. Neurological: Patient is alert and oriented to person, place, and time. Psychiatric: Patient has a normal mood and affect.  Patient behavior is normal.       LABORATORY DATA: Reviewed  Lab Results   Component Value Date    WBC 10.0 01/13/2018    HGB 11.5 (L) 01/13/2018    HCT 35.9 (L) 01/13/2018    MCV 97.6 01/13/2018     01/13/2018     01/13/2018    K 3.8 01/13/2018     01/13/2018    CO2 24 01/13/2018    BUN 7 (L) 01/13/2018    CREATININE 0.75 01/13/2018    LABPROT 5.9 (L) 10/31/2011    LABALBU 3.5 06/23/2016    BILITOT 0.33 06/23/2016    ALKPHOS 47 06/23/2016    AST 12 06/23/2016    ALT 7 06/23/2016    INR 1.1 12/13/2017         Lab Results   Component Value Date    RBC 3.68 (L) 01/13/2018    HGB 11.5 (L) 01/13/2018    MCV 97.6 01/13/2018    MCH 31.3 01/13/2018    MCHC 32.0 01/13/2018    RDW 12.4 01/13/2018    MPV 10.0 01/13/2018    BASOPCT 1 01/13/2018    LYMPHSABS 1.36 01/13/2018    MONOSABS 0.77 01/13/2018    NEUTROABS 7.36 01/13/2018    EOSABS 0.43 01/13/2018    BASOSABS 0.05 01/13/2018         DIAGNOSTIC TESTING:     STV ENDOSCOPY      EGD     PROCEDURE DATE:    MEDICATIONS:      MAC per anesthesia      EBL <10cc     INSTRUMENT: Olympus CF-H180 AL Pediatric flexible Colonoscope.      PREPARATION: The nature and character of the procedure as well as risks, benefits, and alternatives were discussed with the patient and informed consent was obtained. Complications were said to include, but were not limited to: medication allergy, medication reaction, cardiovascular and respiratory problems, bleeding, perforation, infection, and/or missed diagnosis. Following arrival in the endoscopy room, the patient was placed in the left lateral decubitus position and final time-out accomplished in the presence of the nursing staff. Baseline vital signs were obtained and reviewed, and IV sedation was subsequently initiated.         FINDINGS: Rectal examination demonstrated no significant visible external abnormality and digital palpation was unremarkable. Following adequate conscious sedation the colonoscope was introduced and advanced under direct visualization to the cecum, which was identified by the ileocecal valve and appendiceal orifice. The bowel preparation was felt to be FAIR. This included moderate amounts of green stool that was mostly able to be adequately irrigated and aspirated. Cecal intubation time was 9 minutes.      Once maximally inserted, the endoscope was withdrawn and the mucosa was carefully inspected. The mucosal exam was revealed moderate left sided diverticulosis, small polyps in the ascending colon, hepatic flexure, and transverse colon (described below). Retroflexion was performed in the rectum and small internal hemorrhoids.  Withdrawal time was 23 minutes.         IMPRESSION:      FAIR PREP     1) Moderate left sided diverticulosis  2) Ascending colon polyp, 5mm, s/p cold biopsy and removal  3) Hepatic flexure polyps (2 separate, 5mm each) s/p cold biopsy and removal  4) Transverse colon polyps (4 separate, small, 3-5mm each) s/p cold biopsy and removal  5)

## 2020-05-21 PROBLEM — D12.6 TUBULAR ADENOMA OF COLON: Status: ACTIVE | Noted: 2020-05-21

## 2020-08-18 ENCOUNTER — TELEPHONE (OUTPATIENT)
Dept: PHARMACY | Facility: CLINIC | Age: 75
End: 2020-08-18

## 2020-08-18 NOTE — TELEPHONE ENCOUNTER
CLINICAL PHARMACY: ADHERENCE REVIEW  Identified care gap per [employer]; fills at [place]: ACE/ARB adherence    Last Office Visit: unable to determine in this EMR; prescriber (Ezekiel Styles) appears to be external cardiologist    Patient also appears to be taking: statin    ASSESSMENT  ACE/ARB ADHERENCE  PDC: 90% in 2019; 1st fill YTD    Per Carlos Johnson   Lisinopril 5mg daily last filled on 3/29/20 for a 90 day supply; rx cancelled/deactivated by Dr. Iraj Black office, so no further active rx. BP Readings from Last 3 Encounters:   01/30/20 127/71   01/10/20 128/63   01/10/20 (!) 93/49     CrCl cannot be calculated (Patient's most recent lab result is older than the maximum 120 days allowed. ). STATIN ADHERENCE  PDC: 80% in 2019; 100% YTD    Per Carlos Johnson   Simvastatin 40mg daily last filled on 7/27/20 for a 90 day supply; 0 refills remaining. (prev fills: #90 1/26/20, 4/3/20)    Per Chart Review: refill had been requested (on time) 6/26/20, but was declined d/t patient needing appt; refill was approved by PCP 7/27/20, but as \"one-time\" and letter sent to patient notifying appointment needed    Lab Results   Component Value Date    CHOL 268 (H) 02/05/2015    TRIG 105 02/05/2015    HDL 69 02/05/2015    LDLCHOLESTEROL 178 (H) 02/05/2015     ALT   Date Value Ref Range Status   06/23/2016 7 5 - 33 U/L Final     AST   Date Value Ref Range Status   06/23/2016 12 <32 U/L Final     The ASCVD Risk score (Rose Garner., et al., 2013) failed to calculate for the following reasons:    Cannot find a previous HDL lab    Cannot find a previous total cholesterol lab       PLAN   No outreach to patient at this time re adherence (appears lisinopril dc'd and simvastatin was late to refill d/t delay in refill approval since patient needs to schedule appointment). Will send patient a [employer] message re needing to schedule an appointment.     Removed lisinopril from [facility]) Medication list.    Russell Sifuentes, PharmD, [place]  Direct: [phone]  Department, toll free: [phone], option 7     =======================================================   For Pharmacy Admin Tracking Only    PHSO: Yes  Total # of Interventions Recommended: 1  - New Order #: 0 New Medication Order Reason(s):  Adherence  - Maintenance Safety Lab Monitoring #: 1  Recommended intervention potential cost savings: 1  Total Interventions Accepted: 0  Time Spent (min): 10

## 2020-10-20 ENCOUNTER — TELEMEDICINE (OUTPATIENT)
Dept: PRIMARY CARE CLINIC | Age: 75
End: 2020-10-20
Payer: MEDICARE

## 2020-10-20 PROCEDURE — 99214 OFFICE O/P EST MOD 30 MIN: CPT | Performed by: NURSE PRACTITIONER

## 2020-10-20 RX ORDER — LISINOPRIL 5 MG/1
5 TABLET ORAL DAILY
Qty: 90 TABLET | Refills: 2 | Status: SHIPPED | OUTPATIENT
Start: 2020-10-20 | End: 2021-09-21 | Stop reason: SDUPTHER

## 2020-10-20 RX ORDER — SIMVASTATIN 40 MG
TABLET ORAL
Qty: 90 TABLET | Refills: 2 | Status: SHIPPED | OUTPATIENT
Start: 2020-10-20 | End: 2021-09-21 | Stop reason: SDUPTHER

## 2020-10-20 SDOH — ECONOMIC STABILITY: TRANSPORTATION INSECURITY
IN THE PAST 12 MONTHS, HAS THE LACK OF TRANSPORTATION KEPT YOU FROM MEDICAL APPOINTMENTS OR FROM GETTING MEDICATIONS?: NO

## 2020-10-20 SDOH — ECONOMIC STABILITY: FOOD INSECURITY: WITHIN THE PAST 12 MONTHS, THE FOOD YOU BOUGHT JUST DIDN'T LAST AND YOU DIDN'T HAVE MONEY TO GET MORE.: NEVER TRUE

## 2020-10-20 SDOH — ECONOMIC STABILITY: FOOD INSECURITY: WITHIN THE PAST 12 MONTHS, YOU WORRIED THAT YOUR FOOD WOULD RUN OUT BEFORE YOU GOT MONEY TO BUY MORE.: NEVER TRUE

## 2020-10-20 SDOH — ECONOMIC STABILITY: INCOME INSECURITY: HOW HARD IS IT FOR YOU TO PAY FOR THE VERY BASICS LIKE FOOD, HOUSING, MEDICAL CARE, AND HEATING?: NOT HARD AT ALL

## 2020-10-20 SDOH — ECONOMIC STABILITY: TRANSPORTATION INSECURITY
IN THE PAST 12 MONTHS, HAS LACK OF TRANSPORTATION KEPT YOU FROM MEETINGS, WORK, OR FROM GETTING THINGS NEEDED FOR DAILY LIVING?: NO

## 2020-10-20 ASSESSMENT — PATIENT HEALTH QUESTIONNAIRE - PHQ9
SUM OF ALL RESPONSES TO PHQ QUESTIONS 1-9: 0
SUM OF ALL RESPONSES TO PHQ QUESTIONS 1-9: 0
1. LITTLE INTEREST OR PLEASURE IN DOING THINGS: 0
SUM OF ALL RESPONSES TO PHQ QUESTIONS 1-9: 0
2. FEELING DOWN, DEPRESSED OR HOPELESS: 0
SUM OF ALL RESPONSES TO PHQ9 QUESTIONS 1 & 2: 0

## 2020-10-20 ASSESSMENT — ENCOUNTER SYMPTOMS
SHORTNESS OF BREATH: 0
TROUBLE SWALLOWING: 0
DIARRHEA: 1
BLOOD IN STOOL: 0
BACK PAIN: 1
WHEEZING: 0
VOMITING: 0
ABDOMINAL PAIN: 0

## 2020-10-20 NOTE — PROGRESS NOTES
Edgardo Jeffrey is a 66 y.o. female evaluated virtual visit via [address] on 10/20/2020. Consent:  She and/or health care decision maker is aware that that she may receive a bill for this telephone service, depending on her insurance coverage, and has provided verbal consent to proceed: Yes      Documentation:  I communicated with the patient and/or health care decision maker about hypertension, atrial fibrillation. Details of this discussion including any medical advice provided below      I affirm this is a Patient Initiated Episode with an Established Patient who has not had a related appointment within my department in the past 7 days or scheduled within the next 24 hours. Total Time: minutes: 11-20 minutes    Note: not billable if this call serves to triage the patient into an appointment for the relevant concern      Cleola Canavan                  10/20/2020    TELEHEALTH EVALUATION -- Audio/Visual (During [facility] public health emergency)    Patient and physician are located in their individual homes    HPI:    Edgardo Jeffrey (:  1945) has requested an audio only/video evaluation for the following concern(s): Ju Zambrano presents today for routine follow-up and medication refills. Last office visit was 1 year ago    Ju Zambrano follows with cardiology, they prescribe her pradaxa for her Atrial Fibrillation. She is interested in getting off of the medication due to expense  She has also had an ablation in the past for treatment of atrial fibrillation. Follows with [facility], saw Dr Mateo Garcias. Wants to place a monitor to see if they can stop her Pradaxa. Has not yet made the appt to place the monitor. Will need to wear it for a month. Chantell's only other complaint today is low back pain which occurs after prolonged standing, such as performing tasks like washing dishes. She thinks she might have arthritis.       Review of Systems   Constitutional: Negative for appetite change, fever and unexpected weight change. HENT: Negative for hearing loss and trouble swallowing. Eyes: Negative for visual disturbance. Respiratory: Negative for shortness of breath and wheezing. Cardiovascular: Negative for chest pain and palpitations. Gastrointestinal: Positive for diarrhea (intermittent, states she has IBS). Negative for abdominal pain, blood in stool and vomiting. Endocrine: Negative for polydipsia and polyuria. Genitourinary: Negative for frequency and hematuria. Musculoskeletal: Positive for back pain. Negative for myalgias and neck pain. Neurological: Negative for dizziness, seizures, numbness and headaches. Psychiatric/Behavioral: Negative for suicidal ideas. The patient is not nervous/anxious. Prior to Visit Medications    Medication Sig Taking?  Authorizing Provider   lisinopril (PRINIVIL;ZESTRIL) 5 MG tablet Take 1 tablet by mouth daily Yes JUDAH Delaney CNP   simvastatin (ZOCOR) 40 MG tablet TAKE 1 TABLET BY MOUTH AT BEDTIME Yes JUDAH Delaney CNP   metoprolol tartrate (LOPRESSOR) 25 MG tablet Take 0.5 tablets by mouth 2 times daily Yes JUDAH Delaney CNP   diclofenac sodium 1 % GEL Apply 2 g topically 2 times daily Yes Rashida Morelos MD   dabigatran (PRADAXA) 150 MG capsule Take 1 capsule by mouth 2 times daily Yes Sri Jackson MD   omeprazole (PRILOSEC OTC) 20 MG tablet Take 1 tablet by mouth daily Yes Rashida Morelos MD   cyclobenzaprine (FLEXERIL) 5 MG tablet Take 1 tablet by mouth 3 times daily as needed for Muscle spasms  Patient not taking: Reported on 2020  Rashida Morelos MD       Social History     Tobacco Use    Smoking status: Former Smoker     Packs/day: 1.00     Years: 10.00     Pack years: 10.00     Types: Cigarettes     Last attempt to quit: 2009     Years since quittin.3    Smokeless tobacco: Never Used   Substance Use Topics    Alcohol use: Yes     Comment: occ    Drug use: Never        Past Medical History:   Diagnosis Date    Aneurysm of abdominal aorta (HCC)     Atrial fibrillation (HCC)     Cancer (HCC)     breast    ETD (eustachian tube dysfunction)     Facial asymmetries     Fracture, ankle     Gastric ulcer     History of radiation therapy     right breast cancer    Hx of cardiac cath     Hx of gastroenteritis hx of noninfective gastroenteritis and colitis    Hyperlipidemia     Hypertension     IBS (irritable bowel syndrome)     Jaw pain, non-TMJ     Mammogram abnormal     Mitral valve prolapse syndrome     Pregnancy, incidental               RECORD REVIEW: Previous medical records were reviewed at today's visit. PHYSICAL EXAMINATION:    Vital Signs: (As obtained by patient/caregiver at home)    No flowsheet data found. Physical Exam  Constitutional:       Appearance: Normal appearance. She is not toxic-appearing. HENT:      Head: Normocephalic. Right Ear: External ear normal.      Left Ear: External ear normal.      Nose: Nose normal.      Mouth/Throat:      Mouth: Mucous membranes are moist.   Eyes:      General: No scleral icterus. Right eye: No discharge. Left eye: No discharge. Conjunctiva/sclera: Conjunctivae normal.   Neck:      Musculoskeletal: Normal range of motion. Pulmonary:      Effort: Pulmonary effort is normal.   Skin:     Coloration: Skin is not jaundiced. Neurological:      Mental Status: She is alert and oriented to person, place, and time. Psychiatric:         Mood and Affect: Mood normal.         Behavior: Behavior normal.         Thought Content: Thought content normal.             RECORD REVIEW: Previous medical records were reviewed at today's visit. The past family, medical and social histories were reviewed and unchanged with the exceptions of what is mentioned in this note.     Due to this being a TeleHealth encounter, evaluation of the following organ systems is limited: Vitals/Constitutional/EENT/Resp/CV/GI//MS/Neuro/Skin/Heme-Lymph-Imm. ASSESSMENT/PLAN:  Jane Montano was seen today for discuss medications. Diagnoses and all orders for this visit:    Essential hypertension  -     lisinopril (PRINIVIL;ZESTRIL) 5 MG tablet; Take 1 tablet by mouth daily  -     simvastatin (ZOCOR) 40 MG tablet; TAKE 1 TABLET BY MOUTH AT BEDTIME  -     Comprehensive Metabolic Panel, Fasting    Paroxysmal atrial fibrillation (HCC)  -     metoprolol tartrate (LOPRESSOR) 25 MG tablet; Take 0.5 tablets by mouth 2 times daily    Screening for hyperlipidemia  -     Lipid, Fasting    -Refills provided, routine labs ordered. Patient advised lab monitoring is necessary when prescribing statin medication as well as ACE inhibitor's.  -I encouraged Chantell to wear supportive shoes inside her home when she is standing or ambulating for a long time. 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 An  electronic signature was used to authenticate this note. --JUDAH Bah - CNP on 10/20/2020 at 1:27 PM    This note is created with the assistance of a speech-recognition program. While intending to generate a document that actually reflects the content of the visit, the document can still have some mistakes which may not have been identified and corrected by editing. 9    Pursuant to the emergency declaration under the [facility]5 waiver authority and the [employer] Act, this Virtual  Visit was conducted, with patient's consent, to reduce the patient's risk of exposure to COVID-19 and provide continuity of care for an established patient.     Services were provided through a video synchronous discussion virtually to substitute for in-person clinic visit.

## 2021-05-06 ENCOUNTER — TELEPHONE (OUTPATIENT)
Dept: PHARMACY | Facility: CLINIC | Age: 76
End: 2021-05-06

## 2021-05-06 NOTE — TELEPHONE ENCOUNTER
[facility] CLINICAL PHARMACY REVIEW: ADHERENCE REVIEW  Identified care gap per [employer]; fills at [place] [place]: ACE/ARB and Statin adherence    Last Visit: 10/20/20    Patient found in Outcomes MTM and is currently eligible for TIP lisinopril    ASSESSMENT  ACE/ARB ADHERENCE    Per Insurance Records through April  (2020 [facility] = 60%; YTD [facility] = filled only once):   LISINOPRIL   TAB 5MG last filled on 1/22/21 for 90 day supply. Next refill due: 4/22/21    Per Outcomes MTM Records:   last filled on 4/22/21 for 90 day supply. BP Readings from Last 3 Encounters:   01/30/20 127/71   01/10/20 128/63   01/10/20 (!) 93/49     CrCl cannot be calculated (Patient's most recent lab result is older than the maximum 120 days allowed. ). STATIN ADHERENCE    Per Insurance Records through April 2021 (2020 [facility] = 99%; YTD PDC = filled only once):   SIMVASTATIN  TAB 40MG last filled on 1/22/21 for 90 day supply. Next refill due: 4/22/21    Per Outcomes MTM Records:   last filled on 4/22 for 90 day supply. Lab Results   Component Value Date    CHOL 268 (H) 02/05/2015    TRIG 105 02/05/2015    HDL 69 02/05/2015    LDLCHOLESTEROL 178 (H) 02/05/2015     ALT   Date Value Ref Range Status   06/23/2016 7 5 - 33 U/L Final     AST   Date Value Ref Range Status   06/23/2016 12 <32 U/L Final     The ASCVD Risk score (Kamar Monte., et al., 2013) failed to calculate for the following reasons: The systolic blood pressure is missing    Cannot find a previous HDL lab    Cannot find a previous total cholesterol lab     PLAN  The following are interventions that have been identified:   - Patient eligible for TIP in Outcomes MTM    Reached patient to review. Review TIP and completed. Education provided. No future appointments. Dayan Giraldo CPhT.    [address] [facility], toll free: [phone], option 7

## 2021-07-22 ENCOUNTER — NURSE TRIAGE (OUTPATIENT)
Dept: OTHER | Facility: CLINIC | Age: 76
End: 2021-07-22

## 2021-07-22 NOTE — TELEPHONE ENCOUNTER
Reason for Disposition   Rash looks like large or small blisters (i.e., fluid filled bubbles or sacs on the skin)    Answer Assessment - Initial Assessment Questions  1. APPEARANCE of RASH: \"Describe the rash. \" (e.g., spots, blisters, raised areas, skin peeling, scaly)      Started out like a bug bite that pt thought was turning into cellulitis, pt was put on abx by the doc that she works for- [employer]- but it didn't help and rash has gotten worse, red/raised/on arm it has blisters but on body it is like hives     2. SIZE: \"How big are the spots? \" (e.g., tip of pen, eraser, coin; inches, centimeters)        Some small areas and some large areas     3. LOCATION: \"Where is the rash located? \"         L arm elbow to wrist -entire circumference          R arm elbow to wrist in patches          Under L breast and on breast         Waist and abd look like hives          Down L side of body to knee          A little on neck to face- 3-4 small hive areas     4. COLOR: \"What color is the rash? \" (Note: It is difficult to assess rash color in people with darker-colored skin. When this situation occurs, simply ask the caller to describe what they see.)         Red - possible purple dots but does shreyas    5. ONSET: \"When did the rash begin? \"      2 weeks     6. FEVER: \"Do you have a fever? \" If so, ask: \"What is your temperature, how was it measured, and when did it start? \"         hasnt checked but doesn't think so - Pt checked while talking to writer and it was normal     7. ITCHING: \"Does the rash itch? \" If so, ask: \"How bad is the itch? \" (Scale 1-10; or mild, moderate, severe)           With no mariana it is severe but once pt uses mraiana it helps calm it down     8. CAUSE: \"What do you think is causing the rash? \"         Unsure- thought it was a bug bite and now possibly poison ivy but doesn't know for sure     9. MEDICATION FACTORS: \"Have you started any new medications within the last 2 weeks? \" (e.g., antibiotics) Denies     10. OTHER SYMPTOMS: \"Do you have any other symptoms? \" (e.g., dizziness, headache, sore throat, joint pain)            Nausea, HA- but has subsided, sore throat since this began but possibly right before     11. PREGNANCY: \"Is there any chance you are pregnant? \" \"When was your last menstrual period? \"        NA    Protocols used: RASH OR REDNESS - WIDESPREAD-ADULT-OH    Received call from Demetria Todd  at [facility] with [employer]. Brief description of triage: see above     Triage indicates for patient to be seen NOW in office for spreading rash     Care advice provided, patient verbalizes understanding; denies any other questions or concerns; instructed to call back for any new or worsening symptoms. Writer provided warm transfer to Ny Abbott  at [facility] for appointment scheduling. Attention Provider: Thank you for allowing me to participate in the care of your patient. The patient was connected to triage in response to information provided to the [facility]. Please do not respond through this encounter as the response is not directed to a shared pool.

## 2021-09-21 ENCOUNTER — OFFICE VISIT (OUTPATIENT)
Dept: PRIMARY CARE CLINIC | Age: 76
End: 2021-09-21
Payer: MEDICARE

## 2021-09-21 VITALS
HEART RATE: 58 BPM | TEMPERATURE: 97.3 F | BODY MASS INDEX: 32.78 KG/M2 | DIASTOLIC BLOOD PRESSURE: 78 MMHG | HEIGHT: 64 IN | SYSTOLIC BLOOD PRESSURE: 122 MMHG | OXYGEN SATURATION: 96 % | WEIGHT: 192 LBS

## 2021-09-21 DIAGNOSIS — I48.0 PAROXYSMAL ATRIAL FIBRILLATION (HCC): ICD-10-CM

## 2021-09-21 DIAGNOSIS — I10 ESSENTIAL HYPERTENSION: Primary | ICD-10-CM

## 2021-09-21 DIAGNOSIS — Z23 NEED FOR INFLUENZA VACCINATION: ICD-10-CM

## 2021-09-21 PROCEDURE — 99214 OFFICE O/P EST MOD 30 MIN: CPT | Performed by: NURSE PRACTITIONER

## 2021-09-21 PROCEDURE — G0008 ADMIN INFLUENZA VIRUS VAC: HCPCS | Performed by: NURSE PRACTITIONER

## 2021-09-21 PROCEDURE — 90694 VACC AIIV4 NO PRSRV 0.5ML IM: CPT | Performed by: NURSE PRACTITIONER

## 2021-09-21 RX ORDER — MULTIVITAMIN
TABLET ORAL
COMMUNITY

## 2021-09-21 RX ORDER — LISINOPRIL 5 MG/1
5 TABLET ORAL DAILY
Qty: 90 TABLET | Refills: 2 | Status: SHIPPED | OUTPATIENT
Start: 2021-09-21 | End: 2022-05-11

## 2021-09-21 RX ORDER — SIMVASTATIN 40 MG
TABLET ORAL
Qty: 90 TABLET | Refills: 2 | Status: SHIPPED | OUTPATIENT
Start: 2021-09-21 | End: 2022-06-20

## 2021-09-21 ASSESSMENT — ENCOUNTER SYMPTOMS
ABDOMINAL PAIN: 0
TROUBLE SWALLOWING: 0
SHORTNESS OF BREATH: 0
WHEEZING: 0
BLOOD IN STOOL: 0
VOMITING: 0
DIARRHEA: 1

## 2021-09-21 ASSESSMENT — PATIENT HEALTH QUESTIONNAIRE - PHQ9
SUM OF ALL RESPONSES TO PHQ9 QUESTIONS 1 & 2: 0
SUM OF ALL RESPONSES TO PHQ QUESTIONS 1-9: 0
SUM OF ALL RESPONSES TO PHQ QUESTIONS 1-9: 0
2. FEELING DOWN, DEPRESSED OR HOPELESS: 0
SUM OF ALL RESPONSES TO PHQ QUESTIONS 1-9: 0
1. LITTLE INTEREST OR PLEASURE IN DOING THINGS: 0

## 2021-09-21 NOTE — PROGRESS NOTES
[facility]  [id number] [facility] [address]  Dept: [phone]  Dept Fax: [phone]    Patient Care Team:  JUDAH Willson CNP as PCP - General (Family Medicine)  Eryn Salinas MD as PCP - Internal Medicine (Internal Medicine)  Eryn Salinas MD as PCP - Family Medicine (Internal Medicine)  Eryn Salinas MD as PCP -  (Internal Medicine)  JUDAH Willson CNP as PCP - [facility] Empaneled Provider  Nick Reeves MD as Consulting Physician (Gastroenterology)    2021     Odella Gowers (:  )LG a 64 y.o. female, here for evaluation of the following medical concerns:   Chief Complaint   Patient presents with    Follow-up    Medication Refill    Health Maintenance     Flu vaccine       Malachi South presents today for routine follow-up and medication refills. Last office visit was 1 year ago    Malachi South follows with cardiology, they prescribe her pradaxa for her Atrial Fibrillation. She is interested in getting off of the medication due to expense  She has also had an ablation in the past for treatment of atrial fibrillation. Follows with [facility], saw Dr Reagan Quiros. Wants to place a monitor to see if they can stop her Pradaxa. Has not yet made the appt to place the monitor. Will need to wear it for a month. Has not made any calls in the last year        . Review of Systems   Constitutional: Negative for appetite change, fever and unexpected weight change. HENT: Negative for hearing loss and trouble swallowing. Eyes: Negative for visual disturbance. Respiratory: Negative for shortness of breath and wheezing. Cardiovascular: Negative for chest pain and palpitations. Gastrointestinal: Positive for diarrhea (intermittent, states she has IBS). Negative for abdominal pain, blood in stool and vomiting. Endocrine: Negative for polydipsia and polyuria.    Genitourinary: Negative for frequency and hematuria. Musculoskeletal: Negative for myalgias and neck pain. Neurological: Negative for dizziness, seizures, numbness and headaches. Psychiatric/Behavioral: Negative for suicidal ideas. The patient is not nervous/anxious. Prior to Visit Medications    Medication Sig Taking? Authorizing Provider   Multiple Vitamin (MULTI-VITAMIN DAILY) TABS Take by mouth Yes Historical Provider, MD   BIOTIN PO Take by mouth Yes Historical Provider, MD   Ascorbic Acid (VITAMIN C PO) Take by mouth Yes Historical Provider, MD   lisinopril (PRINIVIL;ZESTRIL) 5 MG tablet Take 1 tablet by mouth daily Yes Kym Cabot, APRN - CNP   simvastatin (ZOCOR) 40 MG tablet TAKE 1 TABLET BY MOUTH AT BEDTIME Yes Kym Cabot, APRN - CNP   metoprolol tartrate (LOPRESSOR) 25 MG tablet Take 0.5 tablets by mouth 2 times daily Yes Kym Cabot, APRN - CNP   diclofenac sodium 1 % GEL Apply 2 g topically 2 times daily Yes Hortensia Land MD   dabigatran (PRADAXA) 150 MG capsule Take 1 capsule by mouth 2 times daily Yes Michelle Garzon MD   omeprazole (PRILOSEC OTC) 20 MG tablet Take 1 tablet by mouth daily Yes Hortensia Land MD        Social History     Tobacco Use    Smoking status: Former Smoker     Packs/day: 1.00     Years: 10.00     Pack years: 10.00     Types: Cigarettes     Quit date: 2009     Years since quittin.2    Smokeless tobacco: Never Used   Substance Use Topics    Alcohol use: Yes     Comment: occ        Vitals:    21 1128   BP: 122/78   Site: Left Upper Arm   Position: Sitting   Pulse: 58   Temp: 97.3 °F (36.3 °C)   TempSrc: Temporal   SpO2: 96%   Weight: 192 lb (87.1 kg)   Height: 5' 4\" (1.626 m)     Estimated body mass index is 32.96 kg/m² as calculated from the following:    Height as of this encounter: 5' 4\" (1.626 m). Weight as of this encounter: 192 lb (87.1 kg).     DIAGNOSTIC FINDINGS:  CBC:  Lab Results   Component Value Date    WBC 10.0 2018 HGB 11.5 01/13/2018     01/13/2018     10/29/2011       BMP:    Lab Results   Component Value Date     01/13/2018    K 3.8 01/13/2018     01/13/2018    CO2 24 01/13/2018    BUN 7 01/13/2018    CREATININE 0.75 01/13/2018    GLUCOSE 94 01/13/2018    GLUCOSE 98 10/29/2011       HEMOGLOBIN A1C: No results found for: LABA1C    FASTING LIPID PANEL:  Lab Results   Component Value Date    CHOL 268 (H) 02/05/2015    HDL 69 02/05/2015    TRIG 105 02/05/2015       Physical Exam  Vitals and nursing note reviewed. Constitutional:       General: She is not in acute distress. Appearance: Normal appearance. She is well-developed. She is not toxic-appearing. HENT:      Head: Normocephalic. Right Ear: External ear normal.      Left Ear: External ear normal.      Nose: Nose normal.      Mouth/Throat:      Mouth: Mucous membranes are moist.   Eyes:      General: No scleral icterus. Right eye: No discharge. Left eye: No discharge. Conjunctiva/sclera: Conjunctivae normal.      Pupils: Pupils are equal, round, and reactive to light. Cardiovascular:      Rate and Rhythm: Normal rate and regular rhythm. Heart sounds: No murmur heard. Pulmonary:      Effort: Pulmonary effort is normal.      Breath sounds: Normal breath sounds. Abdominal:      Palpations: Abdomen is soft. Musculoskeletal:      Cervical back: Normal range of motion and neck supple. Right lower leg: No edema. Left lower leg: No edema. Skin:     General: Skin is warm and dry. Coloration: Skin is not jaundiced. Neurological:      Mental Status: She is alert and oriented to person, place, and time. Coordination: Coordination normal.   Psychiatric:         Mood and Affect: Mood normal.         Behavior: Behavior normal. Behavior is cooperative. Thought Content: Thought content normal.         Judgment: Judgment normal.         ASSESSMENT     Diagnosis Orders   1.  Essential hypertension  lisinopril (PRINIVIL;ZESTRIL) 5 MG tablet    simvastatin (ZOCOR) 40 MG tablet    CBC   2. Need for influenza vaccination  INFLUENZA, QUADV, ADJUVANTED, 65 YRS =, IM, PF, PREFILL SYR, 0.5ML (FLUAD)   3. Paroxysmal atrial fibrillation (HCC)  metoprolol tartrate (LOPRESSOR) 25 MG tablet    CBC          PLAN:  Orders Placed This Encounter   Medications    lisinopril (PRINIVIL;ZESTRIL) 5 MG tablet     Sig: Take 1 tablet by mouth daily     Dispense:  90 tablet     Refill:  2    simvastatin (ZOCOR) 40 MG tablet     Sig: TAKE 1 TABLET BY MOUTH AT BEDTIME     Dispense:  90 tablet     Refill:  2    metoprolol tartrate (LOPRESSOR) 25 MG tablet     Sig: Take 0.5 tablets by mouth 2 times daily     Dispense:  90 tablet     Refill:  2         1. Blood pressure at goal today, continue current medications. 2. Refills provided today, patient again advised to contact cardiology and schedule follow-up for further evaluation of atrial fibrillation. 3. Patient is I completed labs in the last year, stressed importance of kidney and liver function monitoring while on current medications. 4. Influenza vaccine provided today. FOLLOW UP AND INSTRUCTIONS:  No follow-ups on file. · Chantell received counseling on the following healthy behaviors:medication adherence    · Discussed use, benefit, and side effects of prescribed medications. Barriers to  medication compliance addressed. All patient questions answered. Pt  verbalized understanding of all instructions given. · Patient given educational materials - see patient instructions      · Patient advised to contact scheduling offices for any referrals or imaging orders  placed today if they have not been contacted in 48 hours. No follow-ups on file. An electronic signature was used to authenticate this note.     --JUDAH Delaney - CNP on 9/21/2021 at 12:04 PM  Visit Information    Have you changed or started any medications since your last visit including any over-the-counter medicines, vitamins, or herbal medicines? no   Are you having any side effects from any of your medications? -  no  Have you stopped taking any of your medications? Is so, why? -  no    Have you seen any other physician or provider since your last visit? No  Have you had any other diagnostic tests since your last visit? No  Have you been seen in the emergency room and/or had an admission to a hospital since we last saw you? No  Have you had your routine dental cleaning in the past 6 months? no    Have you activated your [employer] account? If not, what are your barriers?  Yes     Patient Care Team:  JUDAH Mercado CNP as PCP - General (Family Medicine)  Nhung Gomez MD as PCP - Internal Medicine (Internal Medicine)  Nhung Gomez MD as PCP - Family Medicine (Internal Medicine)  Nhung Gomez MD as PCP -  (Internal Medicine)  JUDAH Mercado CNP as PCP - [facility] Empaneled Provider  Moy Nielson MD as Consulting Physician (Gastroenterology)    Medical History Review  Past Medical, Family, and Social History reviewed and does not contribute to the patient presenting condition    Health Maintenance   Topic Date Due    Hepatitis C screen  Never done    Shingles Vaccine (2 of 3) 05/19/2011    Lipid screen  02/05/2016    Annual Wellness Visit (AWV)  Never done    DTaP/Tdap/Td vaccine (2 - Tdap) 10/20/2019    Potassium monitoring  01/02/2021    Creatinine monitoring  01/02/2021    Flu vaccine (1) 09/01/2021    DEXA (modify frequency per FRAX score)  Completed    Pneumococcal 65+ years Vaccine  Completed    COVID-19 Vaccine  Completed    Hepatitis A vaccine  Aged Out    Hepatitis B vaccine  Aged Out    Hib vaccine  Aged Out    Meningococcal (ACWY) vaccine  Aged Out

## 2021-09-22 LAB
ALBUMIN SERPL-MCNC: 4 G/DL
ALP BLD-CCNC: 71 U/L
ALT SERPL-CCNC: 11 U/L
ANION GAP SERPL CALCULATED.3IONS-SCNC: 7 MMOL/L
AST SERPL-CCNC: 16 U/L
BASOPHILS ABSOLUTE: NORMAL
BASOPHILS RELATIVE PERCENT: NORMAL
BILIRUB SERPL-MCNC: 0.3 MG/DL (ref 0.1–1.4)
BUN BLDV-MCNC: 10 MG/DL
CALCIUM SERPL-MCNC: 9.1 MG/DL
CHLORIDE BLD-SCNC: 104 MMOL/L
CHOLESTEROL, TOTAL: 217 MG/DL
CHOLESTEROL/HDL RATIO: 3.4
CO2: 31 MMOL/L
CREAT SERPL-MCNC: 0.72 MG/DL
EOSINOPHILS ABSOLUTE: NORMAL
EOSINOPHILS RELATIVE PERCENT: NORMAL
GFR CALCULATED: >60
GLUCOSE BLD-MCNC: 86 MG/DL
HCT VFR BLD CALC: 41.6 % (ref 36–46)
HDLC SERPL-MCNC: 64 MG/DL (ref 35–70)
HEMOGLOBIN: 14.2 G/DL (ref 12–16)
LDL CHOLESTEROL CALCULATED: 116 MG/DL (ref 0–160)
LYMPHOCYTES ABSOLUTE: NORMAL
LYMPHOCYTES RELATIVE PERCENT: NORMAL
MCH RBC QN AUTO: 32.1 PG
MCHC RBC AUTO-ENTMCNC: 34 G/DL
MCV RBC AUTO: 94 FL
MONOCYTES ABSOLUTE: NORMAL
MONOCYTES RELATIVE PERCENT: NORMAL
NEUTROPHILS ABSOLUTE: NORMAL
NEUTROPHILS RELATIVE PERCENT: NORMAL
NONHDLC SERPL-MCNC: ABNORMAL MG/DL
PLATELET # BLD: 246 K/ΜL
PMV BLD AUTO: 7.2 FL
POTASSIUM SERPL-SCNC: 3.8 MMOL/L
RBC # BLD: 4.41 10^6/ΜL
SODIUM BLD-SCNC: 142 MMOL/L
TOTAL PROTEIN: 6.9
TRIGL SERPL-MCNC: 186 MG/DL
VLDLC SERPL CALC-MCNC: 37 MG/DL
WBC # BLD: 6.2 10^3/ML

## 2021-09-28 ENCOUNTER — TELEPHONE (OUTPATIENT)
Dept: PHARMACY | Facility: CLINIC | Age: 76
End: 2021-09-28

## 2021-09-28 NOTE — TELEPHONE ENCOUNTER
[facility] CLINICAL PHARMACY REVIEW: ADHERENCE REVIEW  Identified care gap per [employer]; fills at [facility]: ACE/ARB and Statin adherence    Last Visit: 9/21/21    Patient found in Outcomes MTM and is currently eligible for TIP    ASSESSMENT  ACE/ARB ADHERENCE    Per Insurance Records through 9/5/21 (2020 [place] = 60%; YTD [place] = 83%; Potential Fail Date: 10/6/21):   Lisinopril last filled on 7/23/21 for 10 day supply. Next refill due: 8/2/21    Per [facility] Records:  Lisinopril last filled on 9/21/21 for 90 day supply. Per [address]   Lisinopril last picked up on 9/21/21 for 90 day supply. 2 refills remaining. BP Readings from Last 3 Encounters:   09/21/21 122/78   01/30/20 127/71   01/10/20 128/63     CrCl cannot be calculated (Patient's most recent lab result is older than the maximum 120 days allowed. ). [address] [facility]    Per Insurance Records through 9/5/21 (2020 [place] = 99%; YTD [place] = 89%; Potential Fail Date: 10/26/21): Simvastatin last filled on 8/13/21 for 30 day supply. Next refill due: 9/12/21    Per [facility] Records:  Simvastatin last filled on 9/21/21 for 90 day supply. Per [address] [facility]   Simvastatin last picked up on 9/21/21 for 90 day supply. 2 refills remaining. Lab Results   Component Value Date    CHOL 268 (H) 02/05/2015    TRIG 105 02/05/2015    HDL 69 02/05/2015    LDLCHOLESTEROL 178 (H) 02/05/2015     ALT   Date Value Ref Range Status   06/23/2016 7 5 - 33 U/L Final     AST   Date Value Ref Range Status   06/23/2016 12 <32 U/L Final     The ASCVD Risk score (Donna Bennett., et al., 2013) failed to calculate for the following reasons:    Cannot find a previous HDL lab    Cannot find a previous total cholesterol lab     PLAN  The following are interventions that have been identified:   - Patient eligible for TIP in Outcomes MTM    Reached patient to review. Patient adherent to medications, no issues/concerns with her medications and taking daily as prescribed.  Adherence TIP completed. Will sign off. No future appointments.     Jersey Ty, PharmD, [place] // Department, toll free [phone], option [address] [facility] in place:  No   Recommendation Provided To: Pharmacy: 1   Intervention Detail: CMR/TIP Completed   Gap Closed?: Yes    Intervention Accepted By: Patient/Caregiver: 1   Time Spent (min): 15

## 2021-10-05 DIAGNOSIS — I48.0 PAROXYSMAL ATRIAL FIBRILLATION (HCC): ICD-10-CM

## 2021-10-05 DIAGNOSIS — I10 ESSENTIAL HYPERTENSION: ICD-10-CM

## 2021-10-26 ENCOUNTER — OFFICE VISIT (OUTPATIENT)
Dept: PRIMARY CARE CLINIC | Age: 76
End: 2021-10-26
Payer: MEDICARE

## 2021-10-26 VITALS
TEMPERATURE: 97.5 F | HEART RATE: 70 BPM | OXYGEN SATURATION: 95 % | SYSTOLIC BLOOD PRESSURE: 160 MMHG | BODY MASS INDEX: 33.75 KG/M2 | WEIGHT: 196.6 LBS | DIASTOLIC BLOOD PRESSURE: 72 MMHG

## 2021-10-26 DIAGNOSIS — D22.9 CHANGE IN COLOR OF SKIN MOLE: Primary | ICD-10-CM

## 2021-10-26 PROCEDURE — 99213 OFFICE O/P EST LOW 20 MIN: CPT | Performed by: NURSE PRACTITIONER

## 2021-10-26 SDOH — ECONOMIC STABILITY: FOOD INSECURITY: WITHIN THE PAST 12 MONTHS, THE FOOD YOU BOUGHT JUST DIDN'T LAST AND YOU DIDN'T HAVE MONEY TO GET MORE.: NEVER TRUE

## 2021-10-26 SDOH — ECONOMIC STABILITY: FOOD INSECURITY: WITHIN THE PAST 12 MONTHS, YOU WORRIED THAT YOUR FOOD WOULD RUN OUT BEFORE YOU GOT MONEY TO BUY MORE.: NEVER TRUE

## 2021-10-26 ASSESSMENT — ENCOUNTER SYMPTOMS
TROUBLE SWALLOWING: 0
COLOR CHANGE: 1
DIARRHEA: 1
WHEEZING: 0
ABDOMINAL PAIN: 0
VOMITING: 0
SHORTNESS OF BREATH: 0
BLOOD IN STOOL: 0

## 2021-10-26 ASSESSMENT — SOCIAL DETERMINANTS OF HEALTH (SDOH): HOW HARD IS IT FOR YOU TO PAY FOR THE VERY BASICS LIKE FOOD, HOUSING, MEDICAL CARE, AND HEATING?: NOT HARD AT ALL

## 2021-10-26 NOTE — PATIENT INSTRUCTIONS
Schedule a Vaccine  When you qualify to receive the vaccine, call the [facility]) COVID-19 Vaccination Hotline to schedule your appointment or to get additional information about the [facility]) locations which are offering the COVID-19 vaccine. To be 94% effective, it's important that you receive two doses of one of the COVID-19 vaccines. -If you are receiving the Thurston Peter vaccine, your second shot will be scheduled as close to 21 days after the first shot as possible. -If you are receiving the Moderna vaccine, your second shot will be scheduled as close to 28 days after the first shot as possible. [facility]) COVID-19 Vaccination Hotline: [phone]    Links to [facility]) website and [facility] website:    [email or web address]    [email or web address]

## 2021-10-26 NOTE — PROGRESS NOTES
[facility]  [id number] [facility] [address]  Dept: [phone]  Dept Fax: [phone]    Patient Care Team:  JUDAH Valle CNP as PCP - General (Family Medicine)  Shante Bowen MD as PCP - Internal Medicine (Internal Medicine)  Shante Bowen MD as PCP - Family Medicine (Internal Medicine)  Shante Bowen MD as PCP -  (Internal Medicine)  JUDAH Valle CNP as PCP - [facility]  Prasanna Gil MD as Consulting Physician (Gastroenterology)    10/26/2021     Guanakito Haney (:  )IJ a 64 y.o. female, here for evaluation of the following medical concerns:   Chief Complaint   Patient presents with    Skin Problem    Mole     center of chest       Guanakito Haney is concerned about a mole to he chest.  States it recently started filling with fluid, then \"dries up\" the last few weeks  Also feels it looks \"funny\"  Denies any open skin or drainage. Does not itch, not painful  It has not changed in size. .    Review of Systems   Constitutional: Negative for appetite change, fever and unexpected weight change. HENT: Negative for hearing loss and trouble swallowing. Eyes: Negative for visual disturbance. Respiratory: Negative for shortness of breath and wheezing. Cardiovascular: Negative for chest pain and palpitations. Gastrointestinal: Positive for diarrhea (intermittent, states she has IBS). Negative for abdominal pain, blood in stool and vomiting. Endocrine: Negative for polydipsia and polyuria. Genitourinary: Negative for frequency and hematuria. Musculoskeletal: Negative for myalgias and neck pain. Skin: Positive for color change. Negative for wound. Neurological: Negative for dizziness, seizures, numbness and headaches. Psychiatric/Behavioral: Negative for suicidal ideas. The patient is not nervous/anxious.         Prior to Visit Medications Medication Sig Taking? Authorizing Provider   Multiple Vitamin (MULTI-VITAMIN DAILY) TABS Take by mouth Yes Historical Provider, MD   BIOTIN PO Take by mouth Yes Historical Provider, MD   Ascorbic Acid (VITAMIN C PO) Take by mouth Yes Historical Provider, MD   lisinopril (PRINIVIL;ZESTRIL) 5 MG tablet Take 1 tablet by mouth daily Yes CindyJUDAH Rueda CNP   simvastatin (ZOCOR) 40 MG tablet TAKE 1 TABLET BY MOUTH AT BEDTIME Yes CindyJUDAH Tran CNP   metoprolol tartrate (LOPRESSOR) 25 MG tablet Take 0.5 tablets by mouth 2 times daily Yes CindyJUDAH Tran CNP   diclofenac sodium 1 % GEL Apply 2 g topically 2 times daily Yes Denis Lagunas MD   dabigatran (PRADAXA) 150 MG capsule Take 1 capsule by mouth 2 times daily Yes Ray Kumar MD   omeprazole (PRILOSEC OTC) 20 MG tablet Take 1 tablet by mouth daily Yes Denis Lagunas MD        Social History     Tobacco Use    Smoking status: Former Smoker     Packs/day: 1.00     Years: 10.00     Pack years: 10.00     Types: Cigarettes     Quit date: 2009     Years since quittin.3    Smokeless tobacco: Never Used   Substance Use Topics    Alcohol use: Yes     Comment: occ        Vitals:    10/26/21 1416   BP: (!) 143/82   Site: Left Upper Arm   Position: Sitting   Pulse: 70   Temp: 97.5 °F (36.4 °C)   TempSrc: Temporal   SpO2: 95%   Weight: 196 lb 9.6 oz (89.2 kg)     Estimated body mass index is 33.75 kg/m² as calculated from the following:    Height as of 21: 5' 4\" (1.626 m). Weight as of this encounter: 196 lb 9.6 oz (89.2 kg).     DIAGNOSTIC FINDINGS:  CBC:  Lab Results   Component Value Date    WBC 6.2 2021    HGB 14.2 2021     2021     10/29/2011       BMP:    Lab Results   Component Value Date     2021    K 3.8 2021     2021    CO2 31 2021    BUN 10 2021    CREATININE 0.72 2021    GLUCOSE 86 2021    GLUCOSE 98 10/29/2011 HEMOGLOBIN A1C: No results found for: LABA1C    FASTING LIPID PANEL:  Lab Results   Component Value Date    CHOL 217 09/22/2021    HDL 64 09/22/2021    TRIG 186 09/22/2021       Physical Exam  Vitals and nursing note reviewed. Constitutional:       General: She is not in acute distress. Appearance: Normal appearance. She is well-developed. She is not toxic-appearing. HENT:      Head: Normocephalic. Right Ear: External ear normal.      Left Ear: External ear normal.      Nose: Nose normal.      Mouth/Throat:      Mouth: Mucous membranes are moist.   Eyes:      General: No scleral icterus. Pupils: Pupils are equal, round, and reactive to light. Cardiovascular:      Rate and Rhythm: Normal rate and regular rhythm. Heart sounds: No murmur heard. Pulmonary:      Effort: Pulmonary effort is normal.      Breath sounds: Normal breath sounds. Abdominal:      Palpations: Abdomen is soft. Musculoskeletal:      Cervical back: Normal range of motion and neck supple. Right lower leg: No edema. Left lower leg: No edema. Skin:     General: Skin is warm and dry. Coloration: Skin is not jaundiced. Findings: Lesion (scabbed, lobulated lesion to mid chest.  No surrounding erythema. Roughly 3 to 4 mm in size. Dry, scabbed overlying skin.) present. Neurological:      Mental Status: She is alert and oriented to person, place, and time. Coordination: Coordination normal.   Psychiatric:         Mood and Affect: Mood normal.         Behavior: Behavior normal. Behavior is cooperative. Thought Content: Thought content normal.         Judgment: Judgment normal.         ASSESSMENT     Diagnosis Orders   1. Change in color of skin mole  Reshma Pedersen MD, Dermatology, [facility] SPECIALTY [facility] CENTER:  No orders of the defined types were placed in this encounter.         1. Agreed to dermatology referral at this time due to sun exposed area and sudden onset change in mole.  2. Blood pressure is mildly elevated today, well controlled at previous visits. Continue to monitor    FOLLOW UP AND INSTRUCTIONS:  No follow-ups on file. · Chantell received counseling on the following healthy behaviors:medication adherence    · Discussed use, benefit, and side effects of prescribed medications. Barriers to  medication compliance addressed. All patient questions answered. Pt  verbalized understanding of all instructions given. · Patient given educational materials - see patient instructions      · Patient advised to contact scheduling offices for any referrals or imaging orders  placed today if they have not been contacted in 48 hours. No follow-ups on file. An electronic signature was used to authenticate this note. --JUDAH Rai CNP on 10/26/2021 at 3:13 PM  Visit Information    Have you changed or started any medications since your last visit including any over-the-counter medicines, vitamins, or herbal medicines? no   Are you having any side effects from any of your medications? -  no  Have you stopped taking any of your medications? Is so, why? -  no    Have you seen any other physician or provider since your last visit? No  Have you had any other diagnostic tests since your last visit? No  Have you been seen in the emergency room and/or had an admission to a hospital since we last saw you? No  Have you had your routine dental cleaning in the past 6 months? no    Have you activated your [employer] account? If not, what are your barriers?  Yes     Patient Care Team:  JUDAH Rai CNP as PCP - General (Family Medicine)  Linda Parra MD as PCP - Internal Medicine (Internal Medicine)  Linda Parra MD as PCP - Family Medicine (Internal Medicine)  Linda Parra MD as PCP -  (Internal Medicine)  JUDAH Rai CNP as PCP - [facility]  Nain Sapp MD as Consulting Physician (Gastroenterology)    [facility] History Review  Past Medical, Family, and Social History reviewed and does not contribute to the patient presenting condition    Health Maintenance   Topic Date Due    Hepatitis C screen  Never done    Shingles Vaccine (2 of 3) 05/19/2011    Annual Wellness Visit (AWV)  Never done    DTaP/Tdap/Td vaccine (2 - Tdap) 10/20/2019    COVID-19 Vaccine (3 - Pfizer booster) 07/23/2021    Lipid screen  09/22/2022    Potassium monitoring  09/22/2022    Creatinine monitoring  09/22/2022    DEXA (modify frequency per FRAX score)  Completed    Flu vaccine  Completed    Pneumococcal 65+ years Vaccine  Completed    Hepatitis A vaccine  Aged Out    Hepatitis B vaccine  Aged Out    Hib vaccine  Aged Out    Meningococcal (ACWY) vaccine  Aged Out

## 2021-10-28 ENCOUNTER — OFFICE VISIT (OUTPATIENT)
Dept: DERMATOLOGY | Age: 76
End: 2021-10-28
Payer: MEDICARE

## 2021-10-28 VITALS
OXYGEN SATURATION: 98 % | HEIGHT: 64 IN | HEART RATE: 72 BPM | BODY MASS INDEX: 33.8 KG/M2 | DIASTOLIC BLOOD PRESSURE: 68 MMHG | TEMPERATURE: 97.3 F | WEIGHT: 198 LBS | SYSTOLIC BLOOD PRESSURE: 136 MMHG

## 2021-10-28 DIAGNOSIS — L82.1 SEBORRHEIC KERATOSIS: ICD-10-CM

## 2021-10-28 DIAGNOSIS — D48.5 NEOPLASM OF UNCERTAIN BEHAVIOR OF SKIN: Primary | ICD-10-CM

## 2021-10-28 PROCEDURE — 99202 OFFICE O/P NEW SF 15 MIN: CPT | Performed by: DERMATOLOGY

## 2021-10-28 PROCEDURE — 11102 TANGNTL BX SKIN SINGLE LES: CPT | Performed by: DERMATOLOGY

## 2021-10-28 NOTE — PROGRESS NOTES
Dermatology Patient Note  [address]  [address]  [address]  Dept: [phone]  Dept Fax: [phone]      VISITDATE: 10/28/2021   REFERRING PROVIDER: JUDAH Mack *      Travis Oconnor is a 64 y.o. female  who presents today in the office for:    Other (mole check. PT states she has a mole changing in color (red) and size in the middle of her chest. PT states shes had the mole most of her life.) and Other (no PH/FH of skincancer)      HISTORY OF PRESENT ILLNESS:  As above. States that the mole fills with fluid and then dries down sometimes. Patient states that she is on blood thinner.      MEDICAL PROBLEMS:  Patient Active Problem List    Diagnosis Date Noted    Tubular adenoma of colon 05/21/2020     x3      Blood in stool     Localized swelling on left hand 10/08/2018    Dermatitis 06/19/2018    Atrial fibrillation with rapid ventricular response ([address] 12/13/2017    Hx of cardiac cath in 2011 12/13/2017    Fluid overload 12/13/2017    Syncope 12/13/2017    Neck pain 09/27/2016    Fatigue     Tachycardia     Atrial fibrillation with RVR ([address] 06/22/2016    Essential hypertension 11/03/2015    GERD (gastroesophageal reflux disease) 01/06/2015    Hypercholesteremia 01/06/2015       CURRENT MEDICATIONS:   Current Outpatient Medications   Medication Sig Dispense Refill    Multiple Vitamin (MULTI-VITAMIN DAILY) TABS Take by mouth      BIOTIN PO Take by mouth      Ascorbic Acid (VITAMIN C PO) Take by mouth      lisinopril (PRINIVIL;ZESTRIL) 5 MG tablet Take 1 tablet by mouth daily 90 tablet 2    simvastatin (ZOCOR) 40 MG tablet TAKE 1 TABLET BY MOUTH AT BEDTIME 90 tablet 2    metoprolol tartrate (LOPRESSOR) 25 MG tablet Take 0.5 tablets by mouth 2 times daily 90 tablet 2    diclofenac sodium 1 % GEL Apply 2 g topically 2 times daily 1 Tube 3    dabigatran (PRADAXA) 150 MG capsule Take 1 capsule by mouth 2 times daily 60 capsule Hemostasis was achieved with aluminum chloride and Vaseline and a bandage were applied. Seborrheic keratoses of chest  - reassurance and education    RTC for FBSE    Future Appointments   Date Time Provider Jerzy Busby   6/27/2022  2:00 PM JUDAH Maradiaga - CNP [facility]         There are no Patient Instructions on file for this visit. This note was created with the assistance of a speech-recognition program.  Although the intention is to generate a document that actually reflects the content of the visit, no guarantees can be provided that every mistake has been identified and corrected by editing. I, Dr. Jamison Saleem, personally performed the services described in this documentation, as scribed by Ayaka Parra in my presence, and it is both accurate and complete.     Electronically signed by Matt Fiore MD on 10/28/21 at 1:26 PM EDT

## 2021-10-28 NOTE — PATIENT INSTRUCTIONS

## 2021-10-29 RX ORDER — LIDOCAINE HYDROCHLORIDE AND EPINEPHRINE 10; 10 MG/ML; UG/ML
20 INJECTION, SOLUTION INFILTRATION; PERINEURAL ONCE
Status: SHIPPED | OUTPATIENT
Start: 2021-10-29

## 2021-11-02 DIAGNOSIS — D48.5 NEOPLASM OF UNCERTAIN BEHAVIOR OF SKIN: ICD-10-CM

## 2022-01-17 ENCOUNTER — OFFICE VISIT (OUTPATIENT)
Dept: PRIMARY CARE CLINIC | Age: 77
End: 2022-01-17
Payer: MEDICARE

## 2022-01-17 ENCOUNTER — TELEPHONE (OUTPATIENT)
Dept: PRIMARY CARE CLINIC | Age: 77
End: 2022-01-17

## 2022-01-17 VITALS
SYSTOLIC BLOOD PRESSURE: 112 MMHG | WEIGHT: 189 LBS | DIASTOLIC BLOOD PRESSURE: 64 MMHG | TEMPERATURE: 97.3 F | BODY MASS INDEX: 32.44 KG/M2 | HEART RATE: 61 BPM | OXYGEN SATURATION: 97 %

## 2022-01-17 DIAGNOSIS — H66.93 OTITIS OF BOTH EARS: Primary | ICD-10-CM

## 2022-01-17 PROCEDURE — 99213 OFFICE O/P EST LOW 20 MIN: CPT | Performed by: NURSE PRACTITIONER

## 2022-01-17 RX ORDER — CIPROFLOXACIN HYDROCHLORIDE 3.5 MG/ML
SOLUTION/ DROPS TOPICAL
Qty: 1 EACH | Refills: 0 | Status: SHIPPED | OUTPATIENT
Start: 2022-01-17 | End: 2022-02-15 | Stop reason: SDUPTHER

## 2022-01-17 RX ORDER — CIPROFLOXACIN AND DEXAMETHASONE 3; 1 MG/ML; MG/ML
4 SUSPENSION/ DROPS AURICULAR (OTIC) 2 TIMES DAILY
Qty: 1 EACH | Refills: 0 | Status: SHIPPED | OUTPATIENT
Start: 2022-01-17 | End: 2022-01-17 | Stop reason: CLARIF

## 2022-01-17 RX ORDER — OFLOXACIN 3 MG/ML
5 SOLUTION AURICULAR (OTIC) 2 TIMES DAILY
Qty: 5 ML | Refills: 0 | Status: SHIPPED | OUTPATIENT
Start: 2022-01-17 | End: 2022-01-17 | Stop reason: CLARIF

## 2022-01-17 RX ORDER — AZITHROMYCIN 250 MG/1
250 TABLET, FILM COATED ORAL SEE ADMIN INSTRUCTIONS
Qty: 6 TABLET | Refills: 0 | Status: SHIPPED | OUTPATIENT
Start: 2022-01-17 | End: 2022-01-22

## 2022-01-17 ASSESSMENT — ENCOUNTER SYMPTOMS
COUGH: 0
SORE THROAT: 0
RHINORRHEA: 0
TROUBLE SWALLOWING: 0

## 2022-01-17 NOTE — TELEPHONE ENCOUNTER
----- Message from Livia Amaro sent at 1/17/2022  1:13 PM EST -----  Subject: Message to Provider    QUESTIONS  Information for Provider? Called in asking if they could get the eardrops   with steroid were expensive and wondering if it could be changed to cipro   eye drops with no steroids which is less money for the patient. Please   advise.   ---------------------------------------------------------------------------  --------------  CALL BACK INFO  What is the best way for the office to contact you? OK to leave message on   voicemail  Preferred Call Back Phone Number? [phone]  ---------------------------------------------------------------------------  --------------  SCRIPT ANSWERS  Relationship to Patient? Third Party  Representative Name?  [id number] [facility]

## 2022-01-17 NOTE — PROGRESS NOTES
[facility]  [id number] [facility] [address]  Dept: [phone]  Dept Fax: [phone]    Patient Care Team:  JUDAH Hampton CNP as PCP - General (Family Medicine)  Mouna Nava MD as PCP - Internal Medicine (Internal Medicine)  Mouna Nava MD as PCP - Family Medicine (Internal Medicine)  Mouna Nava MD as PCP -  (Internal Medicine)  JUDAH Hampton CNP as PCP - [address] [place] [address] Provider  Gigi Chen MD as Consulting Physician (Gastroenterology)    2022     Lenoard Duverney (:  [id number])DN a 64 y.o. female, here for evaluation of the following medical concerns:   Chief Complaint   Patient presents with    Ear Drainage     Left, onset 2 weeks ago    Otalgia       Having symptoms of ear pain and fullness x2 weeks. Has seen yellow discharge on a q-tip when placed in her ear  Denies any bloody dicharge    Otalgia   There is pain in both (started in the left, moed now to the right as well) ears. The current episode started 1 to 4 weeks ago. There has been no fever. Associated symptoms include ear discharge and hearing loss. Pertinent negatives include no coughing, neck pain, rhinorrhea or sore throat. .    Review of Systems   Constitutional: Negative for chills and fever. HENT: Positive for ear discharge, ear pain and hearing loss. Negative for rhinorrhea, sore throat and trouble swallowing. Respiratory: Negative for cough. Musculoskeletal: Negative for neck pain. Prior to Visit Medications    Medication Sig Taking?  Authorizing Provider   azithromycin (ZITHROMAX) 250 MG tablet Take 1 tablet by mouth See Admin Instructions for 5 days 500mg on day 1 followed by 250mg on days 2 - 5 Yes JUDAH Hampton CNP   ciprofloxacin-dexamethasone (CIPRODEX) 0.3-0.1 % otic suspension Place 4 drops into the left ear 2 times daily for 7 days Yes JUDAH Hampton - CNP   Multiple Vitamin (MULTI-VITAMIN DAILY) TABS Take by mouth Yes Historical Provider, MD   BIOTIN PO Take by mouth Yes Historical Provider, MD   Ascorbic Acid (VITAMIN C PO) Take by mouth Yes Historical Provider, MD   lisinopril (PRINIVIL;ZESTRIL) 5 MG tablet Take 1 tablet by mouth daily Yes JUDAH Whitfield Si, CNP   simvastatin (ZOCOR) 40 MG tablet TAKE 1 TABLET BY MOUTH AT BEDTIME Yes JUDAH Whitfield Si, CNP   metoprolol tartrate (LOPRESSOR) 25 MG tablet Take 0.5 tablets by mouth 2 times daily Yes JUDAH Whitfield Si, CNP   diclofenac sodium 1 % GEL Apply 2 g topically 2 times daily Yes Valdez Johnson MD   dabigatran (PRADAXA) 150 MG capsule Take 1 capsule by mouth 2 times daily Yes Jo Salazar MD   omeprazole (PRILOSEC OTC) 20 MG tablet Take 1 tablet by mouth daily Yes Valdez Johnson MD        Social History     Tobacco Use    Smoking status: Former Smoker     Packs/day: 1.00     Years: 10.00     Pack years: 10.00     Types: Cigarettes     Quit date: 2009     Years since quittin.5    Smokeless tobacco: Never Used   Substance Use Topics    Alcohol use: Yes     Comment: occ        Vitals:    22 1143   BP: 112/64   Site: Left Upper Arm   Position: Sitting   Pulse: 61   Temp: 97.3 °F (36.3 °C)   TempSrc: Temporal   SpO2: 97%   Weight: 189 lb (85.7 kg)     Estimated body mass index is 32.44 kg/m² as calculated from the following:    Height as of 10/28/21: 5' 4\" (1.626 m). Weight as of this encounter: 189 lb (85.7 kg).     DIAGNOSTIC FINDINGS:  CBC:  Lab Results   Component Value Date    WBC 6.2 2021    HGB 14.2 2021     2021     10/29/2011       BMP:    Lab Results   Component Value Date     2021    K 3.8 2021     2021    CO2 31 2021    BUN 10 2021    CREATININE 0.72 2021    GLUCOSE 86 2021    GLUCOSE 98 10/29/2011       HEMOGLOBIN A1C: No results found for: LABA1C    FASTING LIPID PANEL:  Lab Results   Component Value Date    CHOL 217 09/22/2021    HDL 64 09/22/2021    TRIG 186 09/22/2021       Physical Exam  Constitutional:       Appearance: Normal appearance. She is not ill-appearing. HENT:      Head: Normocephalic. Right Ear: Ear canal and external ear normal. No drainage. There is no impacted cerumen. No mastoid tenderness. Tympanic membrane is erythematous (Mild, centrally). Tympanic membrane is not perforated. Left Ear: Ear canal and external ear normal. No drainage. A middle ear effusion is present. There is no impacted cerumen. No mastoid tenderness. Tympanic membrane is not perforated. Ears:        Comments: Purulent effusion present from 7:00 to 10:00 outer borders of left TM. Musculoskeletal:      Cervical back: Normal range of motion and neck supple. Neurological:      Mental Status: She is alert. ASSESSMENT     Diagnosis Orders   1. Otitis of both ears  azithromycin (ZITHROMAX) 250 MG tablet    ciprofloxacin-dexamethasone (CIPRODEX) 0.3-0.1 % otic suspension          PLAN:  Orders Placed This Encounter   Medications    azithromycin (ZITHROMAX) 250 MG tablet     Sig: Take 1 tablet by mouth See Admin Instructions for 5 days 500mg on day 1 followed by 250mg on days 2 - 5     Dispense:  6 tablet     Refill:  0    ciprofloxacin-dexamethasone (CIPRODEX) 0.3-0.1 % otic suspension     Sig: Place 4 drops into the left ear 2 times daily for 7 days     Dispense:  1 each     Refill:  0         1. Patient with allergy to penicillin, will prescribe Z-Yazan today. Due to reports of discharge, will also cover with topical antibiotic    FOLLOW UP AND INSTRUCTIONS:  No follow-ups on file. · Chantell received counseling on the following healthy behaviors:medication adherence    · Discussed use, benefit, and side effects of prescribed medications. Barriers to  medication compliance addressed. All patient questions answered.   Pt  verbalized understanding of all instructions given. · Patient given educational materials - see patient instructions      · Patient advised to contact scheduling offices for any referrals or imaging orders  placed today if they have not been contacted in 48 hours. No follow-ups on file. An electronic signature was used to authenticate this note. --JUDAH Bowser CNP on 1/17/2022 at 12:34 PM  Visit Information    Have you changed or started any medications since your last visit including any over-the-counter medicines, vitamins, or herbal medicines? no   Are you having any side effects from any of your medications? -  no  Have you stopped taking any of your medications? Is so, why? -  no    Have you seen any other physician or provider since your last visit? Yes - Records Obtained  Have you had any other diagnostic tests since your last visit? Yes - Records Obtained  Have you been seen in the emergency room and/or had an admission to a hospital since we last saw you? No  Have you had your routine dental cleaning in the past 6 months? no    Have you activated your [employer] account? If not, what are your barriers?  Yes     Patient Care Team:  JUDAH Bowser CNP as PCP - General (Family Medicine)  Ludy Naqvi MD as PCP - Internal Medicine (Internal Medicine)  Ludy Naqvi MD as PCP - Family Medicine (Internal Medicine)  Ludy Naqvi MD as PCP -  (Internal Medicine)  [facility]  Allan Gerardo MD as Consulting Physician (Gastroenterology)    Medical History Review  Past Medical, Family, and Social History reviewed and does not contribute to the patient presenting condition    Health Maintenance   Topic Date Due    Hepatitis C screen  Never done    Shingles Vaccine (2 of 3) 05/19/2011    Annual Wellness Visit (AWV)  Never done    DTaP/Tdap/Td vaccine (2 - Tdap) 10/20/2019    Depression Screen  09/21/2022    Lipid screen  09/22/2022    Potassium monitoring  09/22/2022    Creatinine monitoring  09/22/2022    DEXA (modify frequency per FRAX score)  Completed    Flu vaccine  Completed    Pneumococcal 65+ years Vaccine  Completed    COVID-19 Vaccine  Completed    Hepatitis A vaccine  Aged Out    Hepatitis B vaccine  Aged Out    Hib vaccine  Aged Out    Meningococcal (ACWY) vaccine  Aged Out

## 2022-01-17 NOTE — TELEPHONE ENCOUNTER
Spoke with patient, Informed of medication changes. Voiced understanding. No questions asked at time of call.

## 2022-01-17 NOTE — TELEPHONE ENCOUNTER
[address] [facility]. called to inquire about Cipro Eye Drops with the same dosage be sent to pharmacy , states that patient sit still unable to afford copay for current prescription that was sent to pharmacy and states that patient is ok with Copro eye drops w/o the steroid.

## 2022-02-14 ENCOUNTER — TELEPHONE (OUTPATIENT)
Dept: PRIMARY CARE CLINIC | Age: 77
End: 2022-02-14

## 2022-02-14 NOTE — TELEPHONE ENCOUNTER
Patient is having c/o issues with ear drainage, states that she has completed previous prescription for these symptoms and states that is has not improved, would like to know if something else can be sent to pharmacy.     Health Maintenance   Topic Date Due    Hepatitis C screen  Never done    Shingles Vaccine (2 of 3) 05/19/2011    DTaP/Tdap/Td vaccine (2 - Tdap) 10/20/2019    Annual Wellness Visit (AWV)  Never done    Depression Screen  09/21/2022    Lipid screen  09/22/2022    Potassium monitoring  09/22/2022    Creatinine monitoring  09/22/2022    DEXA (modify frequency per FRAX score)  Completed    Flu vaccine  Completed    Pneumococcal 65+ years Vaccine  Completed    COVID-19 Vaccine  Completed    Hepatitis A vaccine  Aged Out    Hepatitis B vaccine  Aged Out    Hib vaccine  Aged Out    Meningococcal (ACWY) vaccine  Aged Out             (applicable per patient's age: Cancer Screenings, Depression Screening, Fall Risk Screening, Immunizations)    LDL Cholesterol (mg/dL)   Date Value   02/05/2015 178 (H)     LDL Calculated (mg/dL)   Date Value   09/22/2021 116     AST (U/L)   Date Value   09/22/2021 16     ALT (U/L)   Date Value   09/22/2021 11     BUN (mg/dL)   Date Value   09/22/2021 10      (goal A1C is < 7)   (goal LDL is <100) need 30-50% reduction from baseline     BP Readings from Last 3 Encounters:   01/17/22 112/64   10/28/21 136/68   10/26/21 (!) 160/72    (goal /80)      All Future Testing planned in [facility]:  Lab Frequency Next Occurrence       Next Visit Date:  Future Appointments   Date Time Provider Jerzy Busby   6/27/2022  2:00 PM JUDAH Rice -  [place]            Patient Active Problem List:     GERD (gastroesophageal reflux disease)     Hypercholesteremia     Essential hypertension     Atrial fibrillation with RVR (HCC)     Fatigue     Tachycardia     Neck pain     Atrial fibrillation with rapid ventricular response (HCC)     Hx of cardiac cath in 2011     Fluid overload     Syncope     Dermatitis     Localized swelling on left hand     Blood in stool     Tubular adenoma of colon

## 2022-02-15 ENCOUNTER — OFFICE VISIT (OUTPATIENT)
Dept: PRIMARY CARE CLINIC | Age: 77
End: 2022-02-15
Payer: MEDICARE

## 2022-02-15 VITALS
TEMPERATURE: 97.3 F | WEIGHT: 192 LBS | SYSTOLIC BLOOD PRESSURE: 124 MMHG | HEART RATE: 60 BPM | DIASTOLIC BLOOD PRESSURE: 68 MMHG | BODY MASS INDEX: 32.96 KG/M2 | OXYGEN SATURATION: 97 %

## 2022-02-15 DIAGNOSIS — H66.93 OTITIS OF BOTH EARS: ICD-10-CM

## 2022-02-15 DIAGNOSIS — H92.12 OTORRHEA OF LEFT EAR: Primary | ICD-10-CM

## 2022-02-15 PROCEDURE — 99213 OFFICE O/P EST LOW 20 MIN: CPT | Performed by: NURSE PRACTITIONER

## 2022-02-15 RX ORDER — DOXYCYCLINE HYCLATE 100 MG
100 TABLET ORAL 2 TIMES DAILY
Qty: 14 TABLET | Refills: 0 | Status: SHIPPED | OUTPATIENT
Start: 2022-02-15 | End: 2022-02-22

## 2022-02-15 RX ORDER — CIPROFLOXACIN HYDROCHLORIDE 3.5 MG/ML
SOLUTION/ DROPS TOPICAL
Qty: 1 EACH | Refills: 0 | Status: SHIPPED | OUTPATIENT
Start: 2022-02-15 | End: 2022-09-07 | Stop reason: ALTCHOICE

## 2022-02-15 ASSESSMENT — ENCOUNTER SYMPTOMS
RHINORRHEA: 0
SORE THROAT: 0
COUGH: 0

## 2022-02-15 NOTE — PROGRESS NOTES
[facility]  [id number] [facility] [address]  Dept: [phone]  Dept Fax: [phone]    Patient Care Team:  Kandis Brunner, APRN - CNP as PCP - General (Family Medicine)  Mitali Negron MD as PCP - Internal Medicine (Internal Medicine)  Mitali Negron MD as PCP - Family Medicine (Internal Medicine)  Mitali Negron MD as PCP -  (Internal Medicine)  Kandis Brunner, APRN - CNP as PCP - [address] [place] [address] [facility]  Lilliana Hale MD as Consulting Physician (Gastroenterology)    2/15/2022     Jackie Veras (:  )CC a 64 y.o. female, here for evaluation of the following medical concerns:   Chief Complaint   Patient presents with    Ear Problem     Bilateral    Fatigue       Jackie Veras presents today for reevaluation of ear fullness and discharge. Was seen 1 month ago and completed course of oral antibiotics and eardrops and denies any improvement in symptoms. Having symptoms of ear ache and fullness x6 weeks. Did start to have some discomfort to the right ear, but that had since resolved and symptoms are now all in the left ear. Has seen yellow discharge on a q-tip when placed in her ear, has occasionally seen some discharge on pillow case  Denies any bloody dicharge    Otalgia   There is pain in the left ear. The current episode started 1 to 4 weeks ago. There has been no fever. Associated symptoms include ear discharge and hearing loss. Pertinent negatives include no coughing, neck pain, rhinorrhea or sore throat. .    Review of Systems   HENT: Positive for ear discharge, ear pain and hearing loss. Negative for rhinorrhea and sore throat. Respiratory: Negative for cough. Musculoskeletal: Negative for neck pain. Prior to Visit Medications    Medication Sig Taking?  Authorizing Provider   ciprofloxacin (CILOXAN) 0.3 % ophthalmic solution Instill 4 drops to the left ear twice daily for 10 days. Yes JUDAH Atkinson CNP   doxycycline hyclate (VIBRA-TABS) 100 MG tablet Take 1 tablet by mouth 2 times daily for 7 days Yes JUDAH Atkinson CNP   Multiple Vitamin (MULTI-VITAMIN DAILY) TABS Take by mouth Yes Historical Provider, MD   BIOTIN PO Take by mouth Yes Historical Provider, MD   Ascorbic Acid (VITAMIN C PO) Take by mouth Yes Historical Provider, MD   lisinopril (PRINIVIL;ZESTRIL) 5 MG tablet Take 1 tablet by mouth daily Yes JUDAH Atkinson CNP   simvastatin (ZOCOR) 40 MG tablet TAKE 1 TABLET BY MOUTH AT BEDTIME Yes JUDAH Atkinson CNP   metoprolol tartrate (LOPRESSOR) 25 MG tablet Take 0.5 tablets by mouth 2 times daily Yes JUDAH Atkinson CNP   diclofenac sodium 1 % GEL Apply 2 g topically 2 times daily Yes Jessica Devlin MD   dabigatran (PRADAXA) 150 MG capsule Take 1 capsule by mouth 2 times daily Yes Jessica Ortiz MD   omeprazole (PRILOSEC OTC) 20 MG tablet Take 1 tablet by mouth daily Yes Jessica Devlin MD        Social History     Tobacco Use    Smoking status: Former Smoker     Packs/day: 1.00     Years: 10.00     Pack years: 10.00     Types: Cigarettes     Quit date: 2009     Years since quittin.6    Smokeless tobacco: Never Used   Substance Use Topics    Alcohol use: Yes     Comment: occ        Vitals:    02/15/22 1112   BP: 124/68   Site: Left Upper Arm   Position: Sitting   Pulse: 60   Temp: 97.3 °F (36.3 °C)   TempSrc: Temporal   SpO2: 97%   Weight: 192 lb (87.1 kg)     Estimated body mass index is 32.96 kg/m² as calculated from the following:    Height as of 10/28/21: 5' 4\" (1.626 m). Weight as of this encounter: 192 lb (87.1 kg).     DIAGNOSTIC FINDINGS:  CBC:  Lab Results   Component Value Date    WBC 6.2 2021    HGB 14.2 2021     2021     10/29/2011       BMP:    Lab Results   Component Value Date     2021    K 3.8 2021     2021    CO2 31 09/22/2021    BUN 10 09/22/2021    CREATININE 0.72 09/22/2021    GLUCOSE 86 09/22/2021    GLUCOSE 98 10/29/2011       HEMOGLOBIN A1C: No results found for: LABA1C    FASTING LIPID PANEL:  Lab Results   Component Value Date    CHOL 217 09/22/2021    HDL 64 09/22/2021    TRIG 186 09/22/2021       Physical Exam  Constitutional:       Appearance: Normal appearance. She is not ill-appearing. HENT:      Head: Normocephalic. Right Ear: Ear canal and external ear normal. No drainage. There is no impacted cerumen. No mastoid tenderness. Tympanic membrane is erythematous (Mild, centrally). Tympanic membrane is not perforated. Left Ear: Ear canal and external ear normal. No drainage. A middle ear effusion is present. There is no impacted cerumen. No mastoid tenderness. Tympanic membrane is not perforated. Ears:        Comments: Purulent effusion present from 7:00 to 10:00 outer borders of left TM. Musculoskeletal:      Cervical back: Normal range of motion and neck supple. Neurological:      Mental Status: She is alert. ASSESSMENT     Diagnosis Orders   1. Otorrhea of left ear  External Referral To ENT   2. Otitis of both ears  ciprofloxacin (CILOXAN) 0.3 % ophthalmic solution    doxycycline hyclate (VIBRA-TABS) 100 MG tablet          PLAN:  Orders Placed This Encounter   Medications    ciprofloxacin (CILOXAN) 0.3 % ophthalmic solution     Sig: Instill 4 drops to the left ear twice daily for 10 days. Dispense:  1 each     Refill:  0    doxycycline hyclate (VIBRA-TABS) 100 MG tablet     Sig: Take 1 tablet by mouth 2 times daily for 7 days     Dispense:  14 tablet     Refill:  0         1. Patient does report anaphylactic reaction to penicillins, cephalosporins not option at this time for treatment. We will try oral doxycycline and retreat with ciprofloxacin drops x10 days  2. Patient given referral to ear nose and throat and advised to schedule based on who is available in her network. Patient verbalized understanding    FOLLOW UP AND INSTRUCTIONS:  Return if symptoms worsen or fail to improve. · Chantell received counseling on the following healthy behaviors:medication adherence    · Discussed use, benefit, and side effects of prescribed medications. Barriers to  medication compliance addressed. All patient questions answered. Pt  verbalized understanding of all instructions given. · Patient given educational materials - see patient instructions      · Patient advised to contact scheduling offices for any referrals or imaging orders  placed today if they have not been contacted in 48 hours. Return if symptoms worsen or fail to improve. An electronic signature was used to authenticate this note. --JUDAH Atkinson CNP on 2/15/2022 at 12:07 PM  Visit Information    Have you changed or started any medications since your last visit including any over-the-counter medicines, vitamins, or herbal medicines? no   Are you having any side effects from any of your medications? -  no  Have you stopped taking any of your medications? Is so, why? -  no    Have you seen any other physician or provider since your last visit? Yes - Records Obtained  Have you had any other diagnostic tests since your last visit? Yes - Records Obtained  Have you been seen in the emergency room and/or had an admission to a hospital since we last saw you? No  Have you had your routine dental cleaning in the past 6 months? no    Have you activated your [email or web address] account? If not, what are your barriers?  Yes     Patient Care Team:  JUDAH Atkinson CNP as PCP - General (Family Medicine)  Jessica Devlin MD as PCP - Internal Medicine (Internal Medicine)  Jessica Devlin MD as PCP - Family Medicine (Internal Medicine)  Jessica Devlin MD as PCP -  (Internal Medicine)  JUDAH Atkinson CNP as PCP [facility]  Viviana Peters MD as Consulting Physician (Gastroenterology)    Medical History Review  Past Medical, Family, and Social History reviewed and does not contribute to the patient presenting condition    Health Maintenance   Topic Date Due    Hepatitis C screen  Never done    Shingles Vaccine (2 of 3) 05/19/2011    DTaP/Tdap/Td vaccine (2 - Tdap) 10/20/2019    Annual Wellness Visit (AWV)  Never done    Depression Screen  09/21/2022    Lipid screen  09/22/2022    Potassium monitoring  09/22/2022    Creatinine monitoring  09/22/2022    DEXA (modify frequency per FRAX score)  Completed    Flu vaccine  Completed    Pneumococcal 65+ years Vaccine  Completed    COVID-19 Vaccine  Completed    Hepatitis A vaccine  Aged Out    Hepatitis B vaccine  Aged Out    Hib vaccine  Aged Out    Meningococcal (ACWY) vaccine  Aged Out

## 2022-05-11 DIAGNOSIS — I10 ESSENTIAL HYPERTENSION: ICD-10-CM

## 2022-05-11 RX ORDER — LISINOPRIL 5 MG/1
TABLET ORAL
Qty: 90 TABLET | Refills: 0 | Status: SHIPPED | OUTPATIENT
Start: 2022-05-11 | End: 2022-09-20

## 2022-05-11 NOTE — TELEPHONE ENCOUNTER
Health Maintenance   Topic Date Due    Annual Wellness Visit (AWV)  Never done    Hepatitis C screen  Never done    Shingles vaccine (2 of 3) 05/19/2011    DTaP/Tdap/Td vaccine (2 - Tdap) 10/20/2019    Depression Screen  09/21/2022    Lipids  09/22/2022    DEXA (modify frequency per FRAX score)  Completed    Flu vaccine  Completed    Pneumococcal 65+ years Vaccine  Completed    COVID-19 Vaccine  Completed    Hepatitis A vaccine  Aged Out    Hepatitis B vaccine  Aged Out    Hib vaccine  Aged Out    Meningococcal (ACWY) vaccine  Aged Out             (applicable per patient's age: Cancer Screenings, Depression Screening, Fall Risk Screening, Immunizations)    LDL Cholesterol (mg/dL)   Date Value   02/05/2015 178 (H)     LDL Calculated (mg/dL)   Date Value   09/22/2021 116     AST (U/L)   Date Value   09/22/2021 16     ALT (U/L)   Date Value   09/22/2021 11     BUN (mg/dL)   Date Value   09/22/2021 10      (goal A1C is < 7)   (goal LDL is <100) need 30-50% reduction from baseline     BP Readings from Last 3 Encounters:   02/15/22 124/68   01/17/22 112/64   10/28/21 136/68    (goal /80)      All Future Testing planned in [facility]:  Lab Frequency Next Occurrence       Next Visit Date:  Future Appointments   Date Time Provider Jerzy Busby   6/27/2022  2:00 PM JUDAH Davila - CNP [facility]            Patient Active Problem List:     GERD (gastroesophageal reflux disease)     Hypercholesteremia     Essential hypertension     Atrial fibrillation with RVR (HCC)     Fatigue     Tachycardia     Neck pain     Atrial fibrillation with rapid ventricular response (HCC)     Hx of cardiac cath in 2011     Fluid overload     Syncope     Dermatitis     Localized swelling on left hand     Blood in stool     Tubular adenoma of colon

## 2022-06-19 DIAGNOSIS — I10 ESSENTIAL HYPERTENSION: ICD-10-CM

## 2022-06-20 RX ORDER — SIMVASTATIN 40 MG
TABLET ORAL
Qty: 90 TABLET | Refills: 0 | Status: SHIPPED | OUTPATIENT
Start: 2022-06-20 | End: 2022-09-20

## 2022-06-20 NOTE — TELEPHONE ENCOUNTER
Health Maintenance   Topic Date Due    Annual Wellness Visit (AWV)  Never done    Hepatitis C screen  Never done    Shingles vaccine (2 of 3) 05/19/2011    DTaP/Tdap/Td vaccine (2 - Tdap) 10/20/2019    Depression Screen  09/21/2022    Lipids  09/22/2022    DEXA (modify frequency per FRAX score)  Completed    Flu vaccine  Completed    Pneumococcal 65+ years Vaccine  Completed    COVID-19 Vaccine  Completed    Hepatitis A vaccine  Aged Out    Hepatitis B vaccine  Aged Out    Hib vaccine  Aged Out    Meningococcal (ACWY) vaccine  Aged Out             (applicable per patient's age: Cancer Screenings, Depression Screening, Fall Risk Screening, Immunizations)    LDL Cholesterol (mg/dL)   Date Value   02/05/2015 178 (H)     LDL Calculated (mg/dL)   Date Value   09/22/2021 116     AST (U/L)   Date Value   09/22/2021 16     ALT (U/L)   Date Value   09/22/2021 11     BUN (mg/dL)   Date Value   09/22/2021 10      (goal A1C is < 7)   (goal LDL is <100) need 30-50% reduction from baseline     BP Readings from Last 3 Encounters:   02/15/22 124/68   01/17/22 112/64   10/28/21 136/68    (goal /80)      All Future Testing planned in [facility]:  Lab Frequency Next Occurrence       Next Visit Date:  No future appointments.          Patient Active Problem List:     GERD (gastroesophageal reflux disease)     Hypercholesteremia     Essential hypertension     Atrial fibrillation with RVR (HCC)     Fatigue     Tachycardia     Neck pain     Atrial fibrillation with rapid ventricular response (HCC)     Hx of cardiac cath in 2011     Fluid overload     Syncope     Dermatitis     Localized swelling on left hand     Blood in stool     Tubular adenoma of colon

## 2022-06-22 ENCOUNTER — TELEPHONE (OUTPATIENT)
Dept: PRIMARY CARE CLINIC | Age: 77
End: 2022-06-22

## 2022-06-22 NOTE — TELEPHONE ENCOUNTER
Writer contacted pharmacy, staff confirmed e-script was received 6/20 and will be ready for pick-up. Informed pt, voiced understanding.

## 2022-08-09 DIAGNOSIS — I48.0 PAROXYSMAL ATRIAL FIBRILLATION (HCC): ICD-10-CM

## 2022-08-09 DIAGNOSIS — I10 ESSENTIAL HYPERTENSION: ICD-10-CM

## 2022-08-09 RX ORDER — SIMVASTATIN 40 MG
TABLET ORAL
Qty: 90 TABLET | Refills: 0 | OUTPATIENT
Start: 2022-08-09

## 2022-09-07 ENCOUNTER — HOSPITAL ENCOUNTER (OUTPATIENT)
Age: 77
Discharge: HOME | End: 2022-09-07
Payer: MEDICARE

## 2022-09-07 ENCOUNTER — OFFICE VISIT (OUTPATIENT)
Dept: PRIMARY CARE CLINIC | Age: 77
End: 2022-09-07
Payer: MEDICARE

## 2022-09-07 ENCOUNTER — HOSPITAL ENCOUNTER (OUTPATIENT)
Dept: CT IMAGING | Age: 77
Discharge: HOME | End: 2022-09-09
Payer: MEDICARE

## 2022-09-07 VITALS
WEIGHT: 192 LBS | SYSTOLIC BLOOD PRESSURE: 135 MMHG | OXYGEN SATURATION: 97 % | DIASTOLIC BLOOD PRESSURE: 72 MMHG | TEMPERATURE: 98.2 F | BODY MASS INDEX: 32.96 KG/M2 | HEART RATE: 77 BPM

## 2022-09-07 DIAGNOSIS — R51.9 NEW ONSET OF HEADACHES AFTER AGE 50: ICD-10-CM

## 2022-09-07 DIAGNOSIS — I48.0 PAROXYSMAL ATRIAL FIBRILLATION (HCC): ICD-10-CM

## 2022-09-07 DIAGNOSIS — I10 ESSENTIAL HYPERTENSION: ICD-10-CM

## 2022-09-07 DIAGNOSIS — R51.9 NEW ONSET OF HEADACHES AFTER AGE 50: Primary | ICD-10-CM

## 2022-09-07 DIAGNOSIS — H53.9 VISION DISTURBANCE: ICD-10-CM

## 2022-09-07 LAB
ABSOLUTE EOS #: 0.43 K/UL (ref 0–0.44)
ABSOLUTE IMMATURE GRANULOCYTE: <0.03 K/UL (ref 0–0.3)
ABSOLUTE LYMPH #: 2.03 K/UL (ref 1.1–3.7)
ABSOLUTE MONO #: 0.53 K/UL (ref 0.1–1.2)
ANION GAP SERPL CALCULATED.3IONS-SCNC: 13 MMOL/L (ref 9–17)
BASOPHILS # BLD: 1 % (ref 0–2)
BASOPHILS ABSOLUTE: 0.07 K/UL (ref 0–0.2)
BUN BLDV-MCNC: 11 MG/DL (ref 8–23)
C-REACTIVE PROTEIN: 3.9 MG/L (ref 0–5)
CALCIUM SERPL-MCNC: 9.3 MG/DL (ref 8.6–10.4)
CHLORIDE BLD-SCNC: 101 MMOL/L (ref 98–107)
CO2: 28 MMOL/L (ref 20–31)
CREAT SERPL-MCNC: 0.73 MG/DL (ref 0.5–0.9)
EOSINOPHILS RELATIVE PERCENT: 5 % (ref 1–4)
GFR AFRICAN AMERICAN: >60 ML/MIN
GFR NON-AFRICAN AMERICAN: >60 ML/MIN
GFR SERPL CREATININE-BSD FRML MDRD: ABNORMAL ML/MIN/{1.73_M2}
GLUCOSE BLD-MCNC: 100 MG/DL (ref 70–99)
HCT VFR BLD CALC: 41.9 % (ref 36.3–47.1)
HEMOGLOBIN: 13.5 G/DL (ref 11.9–15.1)
IMMATURE GRANULOCYTES: 0 %
LYMPHOCYTES # BLD: 25 % (ref 24–43)
MCH RBC QN AUTO: 31.4 PG (ref 25.2–33.5)
MCHC RBC AUTO-ENTMCNC: 32.2 G/DL (ref 28.4–34.8)
MCV RBC AUTO: 97.4 FL (ref 82.6–102.9)
MONOCYTES # BLD: 7 % (ref 3–12)
NRBC AUTOMATED: 0 PER 100 WBC
PDW BLD-RTO: 13.3 % (ref 11.8–14.4)
PLATELET # BLD: 218 K/UL (ref 138–453)
PMV BLD AUTO: 9.3 FL (ref 8.1–13.5)
POTASSIUM SERPL-SCNC: 4.5 MMOL/L (ref 3.7–5.3)
RBC # BLD: 4.3 M/UL (ref 3.95–5.11)
SEDIMENTATION RATE, ERYTHROCYTE: 5 MM/HR (ref 0–30)
SEG NEUTROPHILS: 62 % (ref 36–65)
SEGMENTED NEUTROPHILS ABSOLUTE COUNT: 5.09 K/UL (ref 1.5–8.1)
SODIUM BLD-SCNC: 142 MMOL/L (ref 135–144)
TSH SERPL DL<=0.05 MIU/L-ACNC: 2.77 UIU/ML (ref 0.3–5)
VITAMIN B-12: 343 PG/ML (ref 232–1245)
WBC # BLD: 8.2 K/UL (ref 3.5–11.3)

## 2022-09-07 PROCEDURE — 36415 COLL VENOUS BLD VENIPUNCTURE: CPT

## 2022-09-07 PROCEDURE — 85652 RBC SED RATE AUTOMATED: CPT

## 2022-09-07 PROCEDURE — 82607 VITAMIN B-12: CPT

## 2022-09-07 PROCEDURE — 85025 COMPLETE CBC W/AUTO DIFF WBC: CPT

## 2022-09-07 PROCEDURE — 80048 BASIC METABOLIC PNL TOTAL CA: CPT

## 2022-09-07 PROCEDURE — 1123F ACP DISCUSS/DSCN MKR DOCD: CPT | Performed by: NURSE PRACTITIONER

## 2022-09-07 PROCEDURE — 86140 C-REACTIVE PROTEIN: CPT

## 2022-09-07 PROCEDURE — 84443 ASSAY THYROID STIM HORMONE: CPT

## 2022-09-07 PROCEDURE — 70450 CT HEAD/BRAIN W/O DYE: CPT

## 2022-09-07 PROCEDURE — 99213 OFFICE O/P EST LOW 20 MIN: CPT | Performed by: NURSE PRACTITIONER

## 2022-09-07 ASSESSMENT — ENCOUNTER SYMPTOMS
CHEST TIGHTNESS: 0
SHORTNESS OF BREATH: 0
PHOTOPHOBIA: 0

## 2022-09-07 ASSESSMENT — PATIENT HEALTH QUESTIONNAIRE - PHQ9
SUM OF ALL RESPONSES TO PHQ9 QUESTIONS 1 & 2: 0
SUM OF ALL RESPONSES TO PHQ QUESTIONS 1-9: 0
2. FEELING DOWN, DEPRESSED OR HOPELESS: 0
SUM OF ALL RESPONSES TO PHQ QUESTIONS 1-9: 0
1. LITTLE INTEREST OR PLEASURE IN DOING THINGS: 0

## 2022-09-07 NOTE — PROGRESS NOTES
[facility]  [id number] [facility] [address]  Dept: [phone]  Dept Fax: [phone]    Patient Care Team:  JUDAH Oleary CNP as PCP - General (Family Medicine)  Luciano Kanner, MD as PCP - Internal Medicine (Internal Medicine)  Luciano Kanner, MD as PCP - Family Medicine (Internal Medicine)  Luciano Kanner, MD as PCP -  (Internal Medicine)  JUDAH Oleary CNP as PCP - [facility] Empaneled Provider  Olga Walker MD as Consulting Physician (Gastroenterology)    2022     Binu Machado (:  )XT a 76 y.o. female, here for evaluation of the following medical concerns:   Chief Complaint   Patient presents with    Migraine     States feels like \"electrical/shock currents\"       C/O sensation of electric like shock going through her head. No hx of headache or migraine    Had occurred a few weeks ago, on 1 day and lasted a few minutes. 5-6 in a row. Shoots across forehead from left to right side. Yesterday she states it reoccurred and they just kept coming for over 2 hours. Denies LOC, no facial numbness or tingling. Felt as if she might pass out and decided to lie down. Lying down did not resolve them  Denies general weakness or unilateral weakness. Intermittent vision loss, \"Things go black for a split second\" in regards to vision. Had to stop Pradaxa a few days prior to the most recent episode for dental work, routine cleaning. .    Review of Systems   Constitutional:  Negative for chills and fever. Eyes:  Positive for visual disturbance. Negative for photophobia. Respiratory:  Negative for chest tightness and shortness of breath. Cardiovascular:  Negative for chest pain. Neurological:  Negative for dizziness, tremors, seizures, syncope, facial asymmetry, speech difficulty, weakness and light-headedness. Prior to Visit Medications    Medication Sig Taking? Authorizing Provider   simvastatin (ZOCOR) 40 MG tablet TAKE 1 TABLET BY MOUTH AT BEDTIME Yes JUDAH Rai CNP   lisinopril (PRINIVIL;ZESTRIL) 5 MG tablet TAKE 1 TABLET BY MOUTH EVERY DAY Yes JUDAH Rai CNP   Multiple Vitamin (MULTI-VITAMIN DAILY) TABS Take by mouth Yes Historical Provider, MD   BIOTIN PO Take by mouth Yes Historical Provider, MD   Ascorbic Acid (VITAMIN C PO) Take by mouth Yes Historical Provider, MD   metoprolol tartrate (LOPRESSOR) 25 MG tablet Take 0.5 tablets by mouth 2 times daily Yes JUDAH Rai CNP   diclofenac sodium 1 % GEL Apply 2 g topically 2 times daily Yes Linda Parra MD   dabigatran (PRADAXA) 150 MG capsule Take 1 capsule by mouth 2 times daily Yes Ana Gloria MD   omeprazole (PRILOSEC OTC) 20 MG tablet Take 1 tablet by mouth daily Yes Linda Parra MD        Social History     Tobacco Use    Smoking status: Former     Packs/day: 1.00     Years: 10.00     Pack years: 10.00     Types: Cigarettes     Quit date: 2009     Years since quittin.2    Smokeless tobacco: Never   Substance Use Topics    Alcohol use: Yes     Comment: occ        Vitals:    22 1334   BP: 135/72   Site: Left Upper Arm   Position: Sitting   Pulse: 77   Temp: 98.2 °F (36.8 °C)   TempSrc: Infrared   SpO2: 97%   Weight: 192 lb (87.1 kg)     Estimated body mass index is 32.96 kg/m² as calculated from the following:    Height as of 10/28/21: 5' 4\" (1.626 m). Weight as of this encounter: 192 lb (87.1 kg).     DIAGNOSTIC FINDINGS:  CBC:  Lab Results   Component Value Date/Time    WBC 6.2 2021 12:00 AM    HGB 14.2 2021 12:00 AM     2021 12:00 AM     10/29/2011 09:26 PM       BMP:    Lab Results   Component Value Date/Time     2021 12:00 AM    K 3.8 2021 12:00 AM     2021 12:00 AM    CO2 31 2021 12:00 AM    BUN 10 2021 12:00 AM    CREATININE 0.72 2021 12:00 AM    GLUCOSE 86 09/22/2021 12:00 AM    GLUCOSE 98 10/29/2011 09:26 PM       HEMOGLOBIN A1C: No results found for: LABA1C    FASTING LIPID PANEL:  Lab Results   Component Value Date    CHOL 217 09/22/2021    HDL 64 09/22/2021    TRIG 186 09/22/2021       Physical Exam  Vitals and nursing note reviewed. Constitutional:       General: She is not in acute distress. Appearance: She is well-developed. HENT:      Head: Normocephalic. Eyes:      General: No scleral icterus. Pupils: Pupils are equal, round, and reactive to light. Cardiovascular:      Rate and Rhythm: Normal rate and regular rhythm. Pulmonary:      Effort: Pulmonary effort is normal.      Breath sounds: Normal breath sounds. Abdominal:      Palpations: Abdomen is soft. Musculoskeletal:      Cervical back: Normal range of motion and neck supple. Skin:     General: Skin is warm and dry. Neurological:      General: No focal deficit present. Mental Status: She is alert and oriented to person, place, and time. Cranial Nerves: No cranial nerve deficit or facial asymmetry. Coordination: Coordination normal. Finger-Nose-Finger Test normal. Rapid alternating movements normal.      Gait: Gait normal.   Psychiatric:         Behavior: Behavior normal. Behavior is cooperative. Thought Content: Thought content normal.         Judgment: Judgment normal.       ASSESSMENT     Diagnosis Orders   1. New onset of headaches after age 48  Sedimentation Rate    C-Reactive Protein    CT HEAD WO CONTRAST      2. Essential hypertension  CBC with Auto Differential    Basic Metabolic Panel    Vitamin B12    TSH With Reflex Ft4      3. Paroxysmal atrial fibrillation (HCC)        4. Vision disturbance  CT HEAD WO CONTRAST             PLAN:  No orders of the defined types were placed in this encounter. DDx trigeminal neuralgia. Given recent onset electric boat like headache and over 48year-old, evaluate with stat head CT.   Presenting visual disturbance and loss during episodes. Labs as ordered. Low suspicion for temporal arteritis, blood pressure is at goal without pain over temporal artery. Patient was advised to report to ER if symptoms recur for immediate evaluation    FOLLOW UP AND INSTRUCTIONS:  No follow-ups on file. Discussed use, benefit, and side effects of prescribed medications. Barriers to  medication compliance addressed. All patient questions answered. Pt  verbalized understanding of all instructions given. Patient given educational materials - see patient instructions      Patient advised to contact scheduling offices for any referrals or imaging orders  placed today if they have not been contacted in 48 hours. No follow-ups on file. An electronic signature was used to authenticate this note. --JUDAH Perez CNP on 9/7/2022 at 2:09 PM  Visit Information    Have you changed or started any medications since your last visit including any over-the-counter medicines, vitamins, or herbal medicines? no   Are you having any side effects from any of your medications? -  no  Have you stopped taking any of your medications? Is so, why? -  no    Have you seen any other physician or provider since your last visit? No  Have you had any other diagnostic tests since your last visit? No  Have you been seen in the emergency room and/or had an admission to a hospital since we last saw you? No  Have you had your routine dental cleaning in the past 6 months? no    Have you activated your "[employer]" account? If not, what are your barriers?  Yes     Patient Care Team:  JUDAH Perez CNP as PCP - General (Family Medicine)  Laura Tinajero MD as PCP - Internal Medicine (Internal Medicine)  Laura Tinajero MD as PCP - Family Medicine (Internal Medicine)  Laura Tinajero MD as PCP -  (Internal Medicine)  JUDAH Perez CNP as PCP - [facility] Empaneled Provider  Leti Bob MD as Consulting Physician (Gastroenterology)    Medical History Review  Past Medical, Family, and Social History reviewed and does not contribute to the patient presenting condition    Health Maintenance   Topic Date Due    Annual Wellness Visit (AWV)  Never done    Hepatitis C screen  Never done    Shingles vaccine (2 of 3) 05/19/2011    DTaP/Tdap/Td vaccine (2 - Tdap) 10/20/2019    COVID-19 Vaccine (4 - Booster for Pfizer series) 02/28/2022    Flu vaccine (1) 09/01/2022    Depression Screen  09/21/2022    Lipids  09/22/2022    DEXA (modify frequency per FRAX score)  Completed    Pneumococcal 65+ years Vaccine  Completed    Hepatitis A vaccine  Aged Out    Hepatitis B vaccine  Aged Out    Hib vaccine  Aged Out    Meningococcal (ACWY) vaccine  Aged Out

## 2022-09-17 DIAGNOSIS — I10 ESSENTIAL HYPERTENSION: ICD-10-CM

## 2022-09-19 NOTE — TELEPHONE ENCOUNTER
Health Maintenance   Topic Date Due    Hepatitis C screen  Never done    Shingles vaccine (2 of 3) 05/19/2011    DTaP/Tdap/Td vaccine (2 - Tdap) 10/20/2019    Annual Wellness Visit (AWV)  Never done    COVID-19 Vaccine (4 - Booster for Pfizer series) 02/28/2022    Flu vaccine (1) 09/01/2022    Lipids  09/22/2022    Depression Screen  09/07/2023    DEXA (modify frequency per FRAX score)  Completed    Pneumococcal 65+ years Vaccine  Completed    Hepatitis A vaccine  Aged Out    Hepatitis B vaccine  Aged Out    Hib vaccine  Aged Out    Meningococcal (ACWY) vaccine  Aged Out             (applicable per patient's age: Cancer Screenings, Depression Screening, Fall Risk Screening, Immunizations)    LDL Cholesterol (mg/dL)   Date Value   02/05/2015 178 (H)     LDL Calculated (mg/dL)   Date Value   09/22/2021 116     AST (U/L)   Date Value   09/22/2021 16     ALT (U/L)   Date Value   09/22/2021 11     BUN (mg/dL)   Date Value   09/07/2022 11      (goal A1C is < 7)   (goal LDL is <100) need 30-50% reduction from baseline     BP Readings from Last 3 Encounters:   09/07/22 135/72   02/15/22 124/68   01/17/22 112/64    (goal /80)      All Future Testing planned in [facility]:  Lab Frequency Next Occurrence       Next Visit Date:  No future appointments.          Patient Active Problem List:     GERD (gastroesophageal reflux disease)     Hypercholesteremia     Essential hypertension     Atrial fibrillation with RVR (HCC)     Fatigue     Tachycardia     Neck pain     Atrial fibrillation with rapid ventricular response (HCC)     Hx of cardiac cath in 2011     Fluid overload     Syncope     Dermatitis     Localized swelling on left hand     Blood in stool     Tubular adenoma of colon

## 2022-09-20 ENCOUNTER — TELEPHONE (OUTPATIENT)
Dept: PRIMARY CARE CLINIC | Age: 77
End: 2022-09-20

## 2022-09-20 DIAGNOSIS — L24.7 IRRITANT CONTACT DERMATITIS DUE TO PLANTS, EXCEPT FOOD: Primary | ICD-10-CM

## 2022-09-20 RX ORDER — LISINOPRIL 5 MG/1
TABLET ORAL
Qty: 90 TABLET | Refills: 0 | Status: SHIPPED | OUTPATIENT
Start: 2022-09-20

## 2022-09-20 RX ORDER — SIMVASTATIN 40 MG
TABLET ORAL
Qty: 90 TABLET | Refills: 0 | Status: SHIPPED | OUTPATIENT
Start: 2022-09-20

## 2022-09-20 NOTE — TELEPHONE ENCOUNTER
Pt states she has poison ivy , she went to urgent care on [address]  they gave her doxycycline hyclate 100 mg  she's taking 1 dose every 12 hours  and its not working for her both eyes are swollen shut ,dosages papi 2 hours ,she took she went to urgent care 9/19 and they gave her benadryl ,every 6 hours she take 2 dosea and pt states

## 2022-09-22 ENCOUNTER — OFFICE VISIT (OUTPATIENT)
Dept: PRIMARY CARE CLINIC | Age: 77
End: 2022-09-22
Payer: MEDICARE

## 2022-09-22 VITALS
SYSTOLIC BLOOD PRESSURE: 172 MMHG | WEIGHT: 185 LBS | TEMPERATURE: 97.5 F | DIASTOLIC BLOOD PRESSURE: 89 MMHG | OXYGEN SATURATION: 97 % | HEART RATE: 61 BPM | BODY MASS INDEX: 31.76 KG/M2

## 2022-09-22 DIAGNOSIS — L23.7 POISON IVY DERMATITIS: ICD-10-CM

## 2022-09-22 DIAGNOSIS — H10.13 ALLERGIC CONJUNCTIVITIS OF BOTH EYES: Primary | ICD-10-CM

## 2022-09-22 PROCEDURE — 99213 OFFICE O/P EST LOW 20 MIN: CPT | Performed by: NURSE PRACTITIONER

## 2022-09-22 PROCEDURE — 1123F ACP DISCUSS/DSCN MKR DOCD: CPT | Performed by: NURSE PRACTITIONER

## 2022-09-22 RX ORDER — DOXYCYCLINE HYCLATE 100 MG
TABLET ORAL
COMMUNITY
Start: 2022-09-19

## 2022-09-22 RX ORDER — PREDNISONE 10 MG/1
10 TABLET ORAL 2 TIMES DAILY
Qty: 20 TABLET | Refills: 0 | Status: SHIPPED | OUTPATIENT
Start: 2022-09-22 | End: 2022-10-02

## 2022-09-22 RX ORDER — OLOPATADINE HYDROCHLORIDE 1 MG/ML
1 SOLUTION/ DROPS OPHTHALMIC 2 TIMES DAILY
Qty: 1 EACH | Refills: 0 | Status: SHIPPED | OUTPATIENT
Start: 2022-09-22 | End: 2022-10-02

## 2022-09-22 RX ORDER — PREDNISONE 10 MG/1
TABLET ORAL
COMMUNITY
Start: 2022-09-20

## 2022-09-22 ASSESSMENT — ENCOUNTER SYMPTOMS
CHOKING: 0
TROUBLE SWALLOWING: 0
SHORTNESS OF BREATH: 0
CHEST TIGHTNESS: 0
COUGH: 0

## 2022-09-22 NOTE — PROGRESS NOTES
(PATANOL) 0.1 % ophthalmic solution Place 1 drop into both eyes 2 times daily for 10 days Yes JUDAH Brink CNP   predniSONE (DELTASONE) 10 MG tablet Take 1 tablet by mouth 2 times daily for 10 days Yes JUDAH Brink CNP   lisinopril (PRINIVIL;ZESTRIL) 5 MG tablet TAKE 1 TABLET BY MOUTH EVERY DAY Yes JUDAH Brink CNP   simvastatin (ZOCOR) 40 MG tablet TAKE 1 TABLET BY MOUTH AT BEDTIME Yes JUDAH Brink CNP   Multiple Vitamin (MULTI-VITAMIN DAILY) TABS Take by mouth Yes Historical Provider, MD   BIOTIN PO Take by mouth Yes Historical Provider, MD   Ascorbic Acid (VITAMIN C PO) Take by mouth Yes Historical Provider, MD   diclofenac sodium 1 % GEL Apply 2 g topically 2 times daily Yes Nataliya Moore MD   dabigatran (PRADAXA) 150 MG capsule Take 1 capsule by mouth 2 times daily Yes Neto Casanova MD   omeprazole (PRILOSEC OTC) 20 MG tablet Take 1 tablet by mouth daily Yes Nataliya Moore MD   predniSONE (DELTASONE) 10 MG tablet TAKE 1 TABLET BY MOUTH TWO TIMES A DAY  Patient not taking: Reported on 2022  Historical Provider, MD   metoprolol tartrate (LOPRESSOR) 25 MG tablet Take 0.5 tablets by mouth 2 times daily  JUDAH Brink CNP        Social History     Tobacco Use    Smoking status: Former     Packs/day: 1.00     Years: 10.00     Pack years: 10.00     Types: Cigarettes     Quit date: 2009     Years since quittin.2    Smokeless tobacco: Never   Substance Use Topics    Alcohol use: Yes     Comment: occ        Vitals:    22 0811   BP: (!) 172/89   Site: Right Upper Arm   Position: Sitting   Pulse: 61   Temp: 97.5 °F (36.4 °C)   TempSrc: Infrared   SpO2: 97%   Weight: 185 lb (83.9 kg)     Estimated body mass index is 31.76 kg/m² as calculated from the following:    Height as of 10/28/21: 5' 4\" (1.626 m). Weight as of this encounter: 185 lb (83.9 kg).     DIAGNOSTIC FINDINGS:  CBC:  Lab Results   Component Value Date/Time    WBC 8.2 09/07/2022 02:47 PM    HGB 13.5 09/07/2022 02:47 PM     09/07/2022 02:47 PM     10/29/2011 09:26 PM       BMP:    Lab Results   Component Value Date/Time     09/07/2022 02:47 PM    K 4.5 09/07/2022 02:47 PM     09/07/2022 02:47 PM    CO2 28 09/07/2022 02:47 PM    BUN 11 09/07/2022 02:47 PM    CREATININE 0.73 09/07/2022 02:47 PM    GLUCOSE 100 09/07/2022 02:47 PM    GLUCOSE 98 10/29/2011 09:26 PM       HEMOGLOBIN A1C: No results found for: LABA1C    FASTING LIPID PANEL:  Lab Results   Component Value Date    CHOL 217 09/22/2021    HDL 64 09/22/2021    TRIG 186 09/22/2021       Physical Exam  Constitutional:       Appearance: She is not toxic-appearing. HENT:      Mouth/Throat:      Mouth: Mucous membranes are moist.      Pharynx: No oropharyngeal exudate or posterior oropharyngeal erythema. Eyes:      General: No scleral icterus. Right eye: No discharge. Left eye: No discharge. Extraocular Movements: Extraocular movements intact. Conjunctiva/sclera:      Right eye: Right conjunctiva is not injected. Left eye: Left conjunctiva is not injected. Pupils: Pupils are equal, round, and reactive to light. Pulmonary:      Effort: Pulmonary effort is normal.      Breath sounds: Normal breath sounds. Skin:     Coloration: Skin is not jaundiced. Findings: Rash (Few vesicular lesions to bilateral hands and interdigital spaces. Also with erythema extending to bilateral extremities and left flank region.) present. Neurological:      Mental Status: She is alert. ASSESSMENT     Diagnosis Orders   1. Allergic conjunctivitis of both eyes  olopatadine (PATANOL) 0.1 % ophthalmic solution      2.  Poison ivy dermatitis  predniSONE (DELTASONE) 10 MG tablet             PLAN:  Orders Placed This Encounter   Medications    olopatadine (PATANOL) 0.1 % ophthalmic solution     Sig: Place 1 drop into both eyes 2 times daily for 10 days     Dispense:  1 each Refill:  0    predniSONE (DELTASONE) 10 MG tablet     Sig: Take 1 tablet by mouth 2 times daily for 10 days     Dispense:  20 tablet     Refill:  0         Will extend prednisone dosing for a total of 15 days. Persistent eye itching, will start antihistamine eyedrops, provided with coupon. Advised patient to wash bedding, suspect she is reintroducing oils from the plant causing persistent symptoms. Lungs clear bilaterally  She has not yet taken blood pressure medication    FOLLOW UP AND INSTRUCTIONS:  Return if symptoms worsen or fail to improve. Chantell received counseling on the following healthy behaviors:medication adherence    Discussed use, benefit, and side effects of prescribed medications. Barriers to  medication compliance addressed. All patient questions answered. Pt  verbalized understanding of all instructions given. Patient given educational materials - see patient instructions      Patient advised to contact scheduling offices for any referrals or imaging orders  placed today if they have not been contacted in 48 hours. Return if symptoms worsen or fail to improve. An electronic signature was used to authenticate this note. --JUDAH Calvert - CNP on 9/22/2022 at 8:42 AM  Visit Information    Have you changed or started any medications since your last visit including any over-the-counter medicines, vitamins, or herbal medicines? no   Are you having any side effects from any of your medications? -  no  Have you stopped taking any of your medications? Is so, why? -  no    Have you seen any other physician or provider since your last visit? Yes - Records Obtained  Have you had any other diagnostic tests since your last visit? Yes - Records Obtained  Have you been seen in the emergency room and/or had an admission to a hospital since we last saw you? No  Have you had your routine dental cleaning in the past 6 months? no    Have you activated your [employer] account?  If not, what are your barriers?  Yes     Patient Care Team:  JUDAH Calles CNP as PCP - General (Family Medicine)  Boaz Kwon MD as PCP - Internal Medicine (Internal Medicine)  Boaz Kwon MD as PCP - Family Medicine (Internal Medicine)  Boaz Kwon MD as PCP -  (Internal Medicine)  JUDAH Calles CNP as PCP - [facility] Empaneled Provider  Medina Huang MD as Consulting Physician (Gastroenterology)    Medical History Review  Past Medical, Family, and Social History reviewed and does not contribute to the patient presenting condition    Health Maintenance   Topic Date Due    Hepatitis C screen  Never done    Shingles vaccine (2 of 3) 05/19/2011    DTaP/Tdap/Td vaccine (2 - Tdap) 10/20/2019    Annual Wellness Visit (AWV)  Never done    COVID-19 Vaccine (4 - Booster for Thurston Peter series) 02/28/2022    Flu vaccine (1) 09/01/2022    Lipids  09/22/2022    Depression Screen  09/07/2023    DEXA (modify frequency per FRAX score)  Completed    Pneumococcal 65+ years Vaccine  Completed    Hepatitis A vaccine  Aged Out    Hepatitis B vaccine  Aged Out    Hib vaccine  Aged Out    Meningococcal (ACWY) vaccine  Aged Out

## 2022-10-06 NOTE — TELEPHONE ENCOUNTER
Pt states she does not take any antiallergic medications. Informed of pcp recommendations listed below. Voiced understanding. Pt would like to try topical steroid. Send Rx to [facility].

## 2022-10-06 NOTE — TELEPHONE ENCOUNTER
Following previous message from 9/20 . \"Pt eyes aren't swollen anymore\" but Pt called stating she's still having issues with itching since shes left her appt the prescriptions prescribed did help alittle bit but she still has a rash on her stomach and states she feels terrible, she would like to know what else pcp recommends she can do .

## 2022-10-06 NOTE — TELEPHONE ENCOUNTER
Please let Ana Officer know that I reviewed our last office visit. My notes do not state if she is taking any oral antiallergy medication like Zyrtec, Claritin, or Allegra. That would be my first suggestion to alleviate the itching and continue to address the histamine response caused by the poison ivy.   As she is complaining of a rash localized to her stomach, a topical steroid would be appropriate if she is comfortable

## 2022-10-07 RX ORDER — TRIAMCINOLONE ACETONIDE 1 MG/G
CREAM TOPICAL
Qty: 45 G | Refills: 0 | Status: SHIPPED | OUTPATIENT
Start: 2022-10-07

## 2022-12-21 DIAGNOSIS — I10 ESSENTIAL HYPERTENSION: ICD-10-CM

## 2022-12-21 DIAGNOSIS — I48.0 PAROXYSMAL ATRIAL FIBRILLATION (HCC): ICD-10-CM

## 2022-12-21 RX ORDER — SIMVASTATIN 40 MG
TABLET ORAL
Qty: 90 TABLET | Refills: 0 | Status: SHIPPED | OUTPATIENT
Start: 2022-12-21

## 2022-12-21 RX ORDER — LISINOPRIL 5 MG/1
5 TABLET ORAL DAILY
Qty: 90 TABLET | Refills: 0 | Status: SHIPPED | OUTPATIENT
Start: 2022-12-21

## 2022-12-22 RX ORDER — SIMVASTATIN 40 MG
TABLET ORAL
Qty: 90 TABLET | Refills: 0 | OUTPATIENT
Start: 2022-12-22

## 2022-12-22 RX ORDER — LISINOPRIL 5 MG/1
TABLET ORAL
Qty: 90 TABLET | Refills: 0 | OUTPATIENT
Start: 2022-12-22

## 2023-03-22 DIAGNOSIS — I10 ESSENTIAL HYPERTENSION: ICD-10-CM

## 2023-03-22 RX ORDER — SIMVASTATIN 40 MG
TABLET ORAL
Qty: 90 TABLET | Refills: 0 | Status: SHIPPED | OUTPATIENT
Start: 2023-03-22

## 2023-03-22 RX ORDER — LISINOPRIL 5 MG/1
TABLET ORAL
Qty: 90 TABLET | Refills: 0 | OUTPATIENT
Start: 2023-03-22

## 2023-03-22 RX ORDER — LISINOPRIL 5 MG/1
TABLET ORAL
Qty: 90 TABLET | Refills: 0 | Status: SHIPPED | OUTPATIENT
Start: 2023-03-22

## 2023-03-23 DIAGNOSIS — I10 ESSENTIAL HYPERTENSION: ICD-10-CM

## 2023-03-23 RX ORDER — SIMVASTATIN 40 MG
TABLET ORAL
Qty: 90 TABLET | Refills: 0 | OUTPATIENT
Start: 2023-03-23

## 2023-06-21 DIAGNOSIS — I10 ESSENTIAL HYPERTENSION: ICD-10-CM

## 2023-06-23 DIAGNOSIS — I10 ESSENTIAL HYPERTENSION: ICD-10-CM

## 2023-06-23 RX ORDER — LISINOPRIL 5 MG/1
TABLET ORAL
Qty: 90 TABLET | Refills: 0 | Status: SHIPPED | OUTPATIENT
Start: 2023-06-23

## 2023-06-23 RX ORDER — SIMVASTATIN 40 MG
TABLET ORAL
Qty: 90 TABLET | Refills: 0 | Status: SHIPPED | OUTPATIENT
Start: 2023-06-23

## 2023-09-19 DIAGNOSIS — I10 ESSENTIAL HYPERTENSION: ICD-10-CM

## 2023-09-19 RX ORDER — LISINOPRIL 5 MG/1
TABLET ORAL
Qty: 90 TABLET | Refills: 0 | OUTPATIENT
Start: 2023-09-19

## 2023-09-19 NOTE — TELEPHONE ENCOUNTER
Patient without appointment in the last year, also patient overdue to complete lab work to monitor kidney function.   Please contact to schedule

## 2023-09-19 NOTE — TELEPHONE ENCOUNTER
Patient scheduled for 10/3/23 states she is completely  out of medication if she can have some to hold her over until her appt

## 2023-09-20 DIAGNOSIS — I48.0 PAROXYSMAL ATRIAL FIBRILLATION (HCC): ICD-10-CM

## 2023-09-20 RX ORDER — LISINOPRIL 5 MG/1
5 TABLET ORAL DAILY
Qty: 90 TABLET | Refills: 0 | Status: SHIPPED | OUTPATIENT
Start: 2023-09-20

## 2023-10-02 DIAGNOSIS — I10 ESSENTIAL HYPERTENSION: ICD-10-CM

## 2023-10-03 ENCOUNTER — OFFICE VISIT (OUTPATIENT)
Dept: PRIMARY CARE CLINIC | Age: 78
End: 2023-10-03
Payer: COMMERCIAL

## 2023-10-03 VITALS
OXYGEN SATURATION: 96 % | WEIGHT: 186 LBS | HEART RATE: 76 BPM | DIASTOLIC BLOOD PRESSURE: 73 MMHG | SYSTOLIC BLOOD PRESSURE: 120 MMHG | BODY MASS INDEX: 31.93 KG/M2

## 2023-10-03 DIAGNOSIS — E78.00 HYPERCHOLESTEREMIA: ICD-10-CM

## 2023-10-03 DIAGNOSIS — I10 ESSENTIAL HYPERTENSION: Primary | ICD-10-CM

## 2023-10-03 DIAGNOSIS — I48.0 PAROXYSMAL ATRIAL FIBRILLATION (HCC): ICD-10-CM

## 2023-10-03 DIAGNOSIS — R11.2 NAUSEA AND VOMITING, UNSPECIFIED VOMITING TYPE: ICD-10-CM

## 2023-10-03 DIAGNOSIS — R68.81 EARLY SATIETY: ICD-10-CM

## 2023-10-03 PROCEDURE — 3078F DIAST BP <80 MM HG: CPT | Performed by: NURSE PRACTITIONER

## 2023-10-03 PROCEDURE — 3074F SYST BP LT 130 MM HG: CPT | Performed by: NURSE PRACTITIONER

## 2023-10-03 PROCEDURE — 99214 OFFICE O/P EST MOD 30 MIN: CPT | Performed by: NURSE PRACTITIONER

## 2023-10-03 PROCEDURE — 1123F ACP DISCUSS/DSCN MKR DOCD: CPT | Performed by: NURSE PRACTITIONER

## 2023-10-03 RX ORDER — SIMVASTATIN 40 MG
TABLET ORAL
Qty: 90 TABLET | Refills: 0 | OUTPATIENT
Start: 2023-10-03

## 2023-10-03 SDOH — ECONOMIC STABILITY: HOUSING INSECURITY
IN THE LAST 12 MONTHS, WAS THERE A TIME WHEN YOU DID NOT HAVE A STEADY PLACE TO SLEEP OR SLEPT IN A SHELTER (INCLUDING NOW)?: NO

## 2023-10-03 SDOH — ECONOMIC STABILITY: FOOD INSECURITY: WITHIN THE PAST 12 MONTHS, THE FOOD YOU BOUGHT JUST DIDN'T LAST AND YOU DIDN'T HAVE MONEY TO GET MORE.: NEVER TRUE

## 2023-10-03 SDOH — ECONOMIC STABILITY: INCOME INSECURITY: HOW HARD IS IT FOR YOU TO PAY FOR THE VERY BASICS LIKE FOOD, HOUSING, MEDICAL CARE, AND HEATING?: NOT HARD AT ALL

## 2023-10-03 SDOH — ECONOMIC STABILITY: FOOD INSECURITY: WITHIN THE PAST 12 MONTHS, YOU WORRIED THAT YOUR FOOD WOULD RUN OUT BEFORE YOU GOT MONEY TO BUY MORE.: NEVER TRUE

## 2023-10-03 ASSESSMENT — ENCOUNTER SYMPTOMS
TROUBLE SWALLOWING: 0
SHORTNESS OF BREATH: 0
DIARRHEA: 1
BLOOD IN STOOL: 0
ABDOMINAL PAIN: 1
WHEEZING: 0
VOMITING: 0

## 2023-10-03 ASSESSMENT — PATIENT HEALTH QUESTIONNAIRE - PHQ9
SUM OF ALL RESPONSES TO PHQ QUESTIONS 1-9: 0
SUM OF ALL RESPONSES TO PHQ9 QUESTIONS 1 & 2: 0
2. FEELING DOWN, DEPRESSED OR HOPELESS: 0
SUM OF ALL RESPONSES TO PHQ QUESTIONS 1-9: 0
1. LITTLE INTEREST OR PLEASURE IN DOING THINGS: 0

## 2023-10-03 NOTE — PROGRESS NOTES
few months. Can easily vomit, even with smells. Has not found any triggering foods that cause vomiting. + abd pain after eating meals, in lower stomach. Sometimes accompanied by loose stools. Rarely feels food sticking    Abdominal Pain  This is a chronic problem. The current episode started more than 1 month ago (4-5 months). The problem occurs every several days. The problem has been unchanged. The pain is located in the LLQ, RLQ and periumbilical region. Associated symptoms include diarrhea (intermittent, states she has IBS). Pertinent negatives include no fever, frequency, headaches, hematuria, myalgias or vomiting. The pain is aggravated by eating. She has tried proton pump inhibitors for the symptoms. Her past medical history is significant for PUD. There is no history of gallstones, irritable bowel syndrome or ulcerative colitis. Review of Systems   Constitutional:  Negative for appetite change, fever and unexpected weight change. HENT:  Negative for hearing loss and trouble swallowing. Eyes:  Negative for visual disturbance. Respiratory:  Negative for shortness of breath and wheezing. Cardiovascular:  Negative for chest pain and palpitations. Gastrointestinal:  Positive for abdominal pain and diarrhea (intermittent, states she has IBS). Negative for blood in stool and vomiting. Endocrine: Negative for polydipsia and polyuria. Genitourinary:  Negative for difficulty urinating, frequency and hematuria. Musculoskeletal:  Negative for myalgias and neck pain. Neurological:  Negative for dizziness, seizures, numbness and headaches. Psychiatric/Behavioral:  Negative for suicidal ideas. The patient is not nervous/anxious.            Objective     DIAGNOSTIC FINDINGS:  CBC:  Lab Results   Component Value Date/Time    WBC 8.2 09/07/2022 02:47 PM    HGB 13.5 09/07/2022 02:47 PM     09/07/2022 02:47 PM     10/29/2011 09:26 PM       BMP:    Lab Results   Component Value Date/Time

## 2023-10-10 DIAGNOSIS — I10 ESSENTIAL HYPERTENSION: ICD-10-CM

## 2023-10-10 RX ORDER — SIMVASTATIN 40 MG
TABLET ORAL
Qty: 90 TABLET | Refills: 0 | Status: SHIPPED | OUTPATIENT
Start: 2023-10-10

## 2023-10-13 ENCOUNTER — HOSPITAL ENCOUNTER (OUTPATIENT)
Age: 78
Setting detail: SPECIMEN
Discharge: HOME | End: 2023-10-13

## 2023-10-13 DIAGNOSIS — R11.2 NAUSEA AND VOMITING, UNSPECIFIED VOMITING TYPE: ICD-10-CM

## 2023-10-13 DIAGNOSIS — E78.00 HYPERCHOLESTEREMIA: ICD-10-CM

## 2023-10-13 DIAGNOSIS — R68.81 EARLY SATIETY: ICD-10-CM

## 2023-10-13 DIAGNOSIS — I10 ESSENTIAL HYPERTENSION: ICD-10-CM

## 2023-10-13 DIAGNOSIS — I48.0 PAROXYSMAL ATRIAL FIBRILLATION (HCC): ICD-10-CM

## 2023-10-13 LAB
ANION GAP SERPL CALCULATED.3IONS-SCNC: 12 MMOL/L (ref 9–17)
BUN SERPL-MCNC: 12 MG/DL (ref 8–23)
CALCIUM SERPL-MCNC: 9.5 MG/DL (ref 8.6–10.4)
CHLORIDE SERPL-SCNC: 103 MMOL/L (ref 98–107)
CHOLEST SERPL-MCNC: 190 MG/DL
CHOLESTEROL/HDL RATIO: 2.5
CO2 SERPL-SCNC: 26 MMOL/L (ref 20–31)
CREAT SERPL-MCNC: 0.8 MG/DL (ref 0.5–0.9)
GFR SERPL CREATININE-BSD FRML MDRD: >60 ML/MIN/1.73M2
GLUCOSE SERPL-MCNC: 93 MG/DL (ref 70–99)
HDLC SERPL-MCNC: 75 MG/DL
LDLC SERPL CALC-MCNC: 97 MG/DL (ref 0–130)
POTASSIUM SERPL-SCNC: 4.5 MMOL/L (ref 3.7–5.3)
SODIUM SERPL-SCNC: 141 MMOL/L (ref 135–144)
TRIGL SERPL-MCNC: 89 MG/DL

## 2023-10-13 ASSESSMENT — LIFESTYLE VARIABLES
HOW MANY STANDARD DRINKS CONTAINING ALCOHOL DO YOU HAVE ON A TYPICAL DAY: 1
HOW OFTEN DO YOU HAVE A DRINK CONTAINING ALCOHOL: 2-3 TIMES A WEEK
HOW MANY STANDARD DRINKS CONTAINING ALCOHOL DO YOU HAVE ON A TYPICAL DAY: 1 OR 2
HOW OFTEN DO YOU HAVE A DRINK CONTAINING ALCOHOL: 4
HOW OFTEN DO YOU HAVE SIX OR MORE DRINKS ON ONE OCCASION: 1

## 2023-10-13 ASSESSMENT — PATIENT HEALTH QUESTIONNAIRE - PHQ9
SUM OF ALL RESPONSES TO PHQ QUESTIONS 1-9: 0
2. FEELING DOWN, DEPRESSED OR HOPELESS: 0
SUM OF ALL RESPONSES TO PHQ QUESTIONS 1-9: 0
SUM OF ALL RESPONSES TO PHQ9 QUESTIONS 1 & 2: 0
1. LITTLE INTEREST OR PLEASURE IN DOING THINGS: 0
SUM OF ALL RESPONSES TO PHQ QUESTIONS 1-9: 0
SUM OF ALL RESPONSES TO PHQ QUESTIONS 1-9: 0

## 2023-10-14 LAB
MICROORGANISM/AGENT SPEC: NEGATIVE
SPECIMEN DESCRIPTION: NORMAL

## 2023-10-17 ENCOUNTER — OFFICE VISIT (OUTPATIENT)
Dept: PRIMARY CARE CLINIC | Age: 78
End: 2023-10-17
Payer: COMMERCIAL

## 2023-10-17 VITALS — WEIGHT: 188.8 LBS | OXYGEN SATURATION: 97 % | HEIGHT: 64 IN | BODY MASS INDEX: 32.23 KG/M2 | HEART RATE: 68 BPM

## 2023-10-17 DIAGNOSIS — Z00.00 INITIAL MEDICARE ANNUAL WELLNESS VISIT: Primary | ICD-10-CM

## 2023-10-17 DIAGNOSIS — Z71.89 ACP (ADVANCE CARE PLANNING): ICD-10-CM

## 2023-10-17 PROCEDURE — 90694 VACC AIIV4 NO PRSRV 0.5ML IM: CPT | Performed by: NURSE PRACTITIONER

## 2023-10-17 PROCEDURE — 99497 ADVNCD CARE PLAN 30 MIN: CPT | Performed by: NURSE PRACTITIONER

## 2023-10-17 PROCEDURE — G0438 PPPS, INITIAL VISIT: HCPCS | Performed by: NURSE PRACTITIONER

## 2023-10-17 PROCEDURE — 1123F ACP DISCUSS/DSCN MKR DOCD: CPT | Performed by: NURSE PRACTITIONER

## 2023-10-17 PROCEDURE — G0008 ADMIN INFLUENZA VIRUS VAC: HCPCS | Performed by: NURSE PRACTITIONER

## 2023-10-17 NOTE — PATIENT INSTRUCTIONS
After Visit Summary for your review. Other Preventive Recommendations:    A preventive eye exam performed by an eye specialist is recommended every 1-2 years to screen for glaucoma; cataracts, macular degeneration, and other eye disorders. A preventive dental visit is recommended every 6 months. Try to get at least 150 minutes of exercise per week or 10,000 steps per day on a pedometer . Order or download the FREE \"Exercise & Physical Activity: Your Everyday Guide\" from [employer]. Call 4-680.955.5134 or search [employer] online. You need 6714-8661 mg of calcium and 8323-1990 IU of vitamin D per day. It is possible to meet your calcium requirement with diet alone, but a vitamin D supplement is usually necessary to meet this goal.  When exposed to the sun, use a sunscreen that protects against both UVA and UVB radiation with an SPF of 30 or greater. Reapply every 2 to 3 hours or after sweating, drying off with a towel, or swimming. Always wear a seat belt when traveling in a car. Always wear a helmet when riding a bicycle or motorcycle.

## 2023-10-26 ENCOUNTER — HOSPITAL ENCOUNTER (OUTPATIENT)
Dept: ULTRASOUND IMAGING | Age: 78
Discharge: HOME | End: 2023-10-28
Payer: COMMERCIAL

## 2023-10-26 DIAGNOSIS — R68.81 EARLY SATIETY: ICD-10-CM

## 2023-10-26 DIAGNOSIS — R11.2 NAUSEA AND VOMITING, UNSPECIFIED VOMITING TYPE: ICD-10-CM

## 2023-10-26 PROCEDURE — 76705 ECHO EXAM OF ABDOMEN: CPT

## 2023-11-06 DIAGNOSIS — R68.81 EARLY SATIETY: Primary | ICD-10-CM

## 2023-11-06 DIAGNOSIS — R11.2 NAUSEA AND VOMITING, UNSPECIFIED VOMITING TYPE: ICD-10-CM

## 2023-11-11 ENCOUNTER — APPOINTMENT (OUTPATIENT)
Dept: CT IMAGING | Age: 78
DRG: 309 | End: 2023-11-11
Payer: COMMERCIAL

## 2023-11-11 ENCOUNTER — APPOINTMENT (OUTPATIENT)
Dept: GENERAL RADIOLOGY | Age: 78
DRG: 309 | End: 2023-11-11
Payer: COMMERCIAL

## 2023-11-11 ENCOUNTER — HOSPITAL ENCOUNTER (INPATIENT)
Age: 78
LOS: 3 days | Discharge: HOME | DRG: 309 | End: 2023-11-14
Attending: EMERGENCY MEDICINE | Admitting: INTERNAL MEDICINE
Payer: COMMERCIAL

## 2023-11-11 DIAGNOSIS — M79.661 PAIN OF RIGHT CALF: ICD-10-CM

## 2023-11-11 DIAGNOSIS — I48.0 PAROXYSMAL ATRIAL FIBRILLATION (HCC): Primary | ICD-10-CM

## 2023-11-11 PROBLEM — I48.91 A-FIB (HCC): Status: ACTIVE | Noted: 2023-11-11

## 2023-11-11 PROBLEM — R91.1 LUNG NODULE: Status: ACTIVE | Noted: 2023-11-11

## 2023-11-11 PROBLEM — N39.0 UTI (URINARY TRACT INFECTION): Status: ACTIVE | Noted: 2023-11-11

## 2023-11-11 LAB
ALBUMIN SERPL-MCNC: 4.1 G/DL (ref 3.5–5.2)
ALBUMIN/GLOB SERPL: 1.2 {RATIO} (ref 1–2.5)
ALP SERPL-CCNC: 78 U/L (ref 35–104)
ALT SERPL-CCNC: 15 U/L (ref 5–33)
ANION GAP SERPL CALCULATED.3IONS-SCNC: 14 MMOL/L (ref 9–17)
AST SERPL-CCNC: 24 U/L
BACTERIA URNS QL MICRO: ABNORMAL
BASOPHILS # BLD: 0.08 K/UL (ref 0–0.2)
BASOPHILS NFR BLD: 1 % (ref 0–2)
BILIRUB SERPL-MCNC: 0.7 MG/DL (ref 0.3–1.2)
BILIRUB UR QL STRIP: NEGATIVE
BUN SERPL-MCNC: 9 MG/DL (ref 8–23)
CALCIUM SERPL-MCNC: 9.8 MG/DL (ref 8.6–10.4)
CHLORIDE SERPL-SCNC: 102 MMOL/L (ref 98–107)
CLARITY UR: ABNORMAL
CO2 SERPL-SCNC: 24 MMOL/L (ref 20–31)
COLOR UR: YELLOW
CREAT SERPL-MCNC: 0.7 MG/DL (ref 0.5–0.9)
EOSINOPHIL # BLD: 0.2 K/UL (ref 0–0.44)
EOSINOPHILS RELATIVE PERCENT: 2 % (ref 1–4)
EPI CELLS #/AREA URNS HPF: ABNORMAL /HPF (ref 0–5)
ERYTHROCYTE [DISTWIDTH] IN BLOOD BY AUTOMATED COUNT: 13.1 % (ref 11.8–14.4)
GFR SERPL CREATININE-BSD FRML MDRD: >60 ML/MIN/1.73M2
GLUCOSE SERPL-MCNC: 110 MG/DL (ref 70–99)
GLUCOSE UR STRIP-MCNC: NEGATIVE MG/DL
HCT VFR BLD AUTO: 44.1 % (ref 36.3–47.1)
HGB BLD-MCNC: 14.3 G/DL (ref 11.9–15.1)
HGB UR QL STRIP.AUTO: ABNORMAL
IMM GRANULOCYTES # BLD AUTO: 0.04 K/UL (ref 0–0.3)
IMM GRANULOCYTES NFR BLD: 0 %
KETONES UR STRIP-MCNC: NEGATIVE MG/DL
LEUKOCYTE ESTERASE UR QL STRIP: ABNORMAL
LIPASE SERPL-CCNC: 16 U/L (ref 13–60)
LYMPHOCYTES NFR BLD: 1.52 K/UL (ref 1.1–3.7)
LYMPHOCYTES RELATIVE PERCENT: 16 % (ref 24–43)
MCH RBC QN AUTO: 31.4 PG (ref 25.2–33.5)
MCHC RBC AUTO-ENTMCNC: 32.4 G/DL (ref 28.4–34.8)
MCV RBC AUTO: 96.9 FL (ref 82.6–102.9)
MONOCYTES NFR BLD: 0.7 K/UL (ref 0.1–1.2)
MONOCYTES NFR BLD: 7 % (ref 3–12)
NEUTROPHILS NFR BLD: 74 % (ref 36–65)
NEUTS SEG NFR BLD: 7.22 K/UL (ref 1.5–8.1)
NITRITE UR QL STRIP: NEGATIVE
NRBC BLD-RTO: 0 PER 100 WBC
PH UR STRIP: 6 [PH] (ref 5–8)
PLATELET # BLD AUTO: 243 K/UL (ref 138–453)
PMV BLD AUTO: 9.5 FL (ref 8.1–13.5)
POTASSIUM SERPL-SCNC: 3.8 MMOL/L (ref 3.7–5.3)
PROT SERPL-MCNC: 7.4 G/DL (ref 6.4–8.3)
PROT UR STRIP-MCNC: NEGATIVE MG/DL
RBC # BLD AUTO: 4.55 M/UL (ref 3.95–5.11)
RBC #/AREA URNS HPF: ABNORMAL /HPF (ref 0–2)
SODIUM SERPL-SCNC: 140 MMOL/L (ref 135–144)
SP GR UR STRIP: 1.06 (ref 1–1.03)
TROPONIN I SERPL HS-MCNC: 14 NG/L (ref 0–14)
TROPONIN I SERPL HS-MCNC: 15 NG/L (ref 0–14)
TSH SERPL DL<=0.05 MIU/L-ACNC: 2.72 UIU/ML (ref 0.3–5)
UROBILINOGEN UR STRIP-ACNC: NORMAL EU/DL (ref 0–1)
WBC #/AREA URNS HPF: ABNORMAL /HPF (ref 0–5)
WBC OTHER # BLD: 9.8 K/UL (ref 3.5–11.3)

## 2023-11-11 PROCEDURE — 2580000003 HC RX 258

## 2023-11-11 PROCEDURE — 74174 CTA ABD&PLVS W/CONTRAST: CPT

## 2023-11-11 PROCEDURE — 36415 COLL VENOUS BLD VENIPUNCTURE: CPT

## 2023-11-11 PROCEDURE — 93005 ELECTROCARDIOGRAM TRACING: CPT

## 2023-11-11 PROCEDURE — 87086 URINE CULTURE/COLONY COUNT: CPT

## 2023-11-11 PROCEDURE — 96365 THER/PROPH/DIAG IV INF INIT: CPT

## 2023-11-11 PROCEDURE — 71275 CT ANGIOGRAPHY CHEST: CPT

## 2023-11-11 PROCEDURE — 84443 ASSAY THYROID STIM HORMONE: CPT

## 2023-11-11 PROCEDURE — 6370000000 HC RX 637 (ALT 250 FOR IP)

## 2023-11-11 PROCEDURE — 83690 ASSAY OF LIPASE: CPT

## 2023-11-11 PROCEDURE — 94640 AIRWAY INHALATION TREATMENT: CPT

## 2023-11-11 PROCEDURE — 6360000002 HC RX W HCPCS

## 2023-11-11 PROCEDURE — 85025 COMPLETE CBC W/AUTO DIFF WBC: CPT

## 2023-11-11 PROCEDURE — 81001 URINALYSIS AUTO W/SCOPE: CPT

## 2023-11-11 PROCEDURE — 2500000003 HC RX 250 WO HCPCS

## 2023-11-11 PROCEDURE — 80053 COMPREHEN METABOLIC PANEL: CPT

## 2023-11-11 PROCEDURE — 2060000000 HC ICU INTERMEDIATE R&B

## 2023-11-11 PROCEDURE — 96376 TX/PRO/DX INJ SAME DRUG ADON: CPT

## 2023-11-11 PROCEDURE — 71045 X-RAY EXAM CHEST 1 VIEW: CPT

## 2023-11-11 PROCEDURE — 99223 1ST HOSP IP/OBS HIGH 75: CPT | Performed by: INTERNAL MEDICINE

## 2023-11-11 PROCEDURE — 94761 N-INVAS EAR/PLS OXIMETRY MLT: CPT

## 2023-11-11 PROCEDURE — 99285 EMERGENCY DEPT VISIT HI MDM: CPT

## 2023-11-11 PROCEDURE — 84484 ASSAY OF TROPONIN QUANT: CPT

## 2023-11-11 PROCEDURE — 6360000004 HC RX CONTRAST MEDICATION

## 2023-11-11 PROCEDURE — 96366 THER/PROPH/DIAG IV INF ADDON: CPT

## 2023-11-11 RX ORDER — POTASSIUM CHLORIDE 20 MEQ/1
40 TABLET, EXTENDED RELEASE ORAL PRN
Status: DISCONTINUED | OUTPATIENT
Start: 2023-11-11 | End: 2023-11-14 | Stop reason: HOSPADM

## 2023-11-11 RX ORDER — CIPROFLOXACIN 500 MG/1
500 TABLET, FILM COATED ORAL ONCE
Status: COMPLETED | OUTPATIENT
Start: 2023-11-11 | End: 2023-11-11

## 2023-11-11 RX ORDER — POTASSIUM CHLORIDE 7.45 MG/ML
10 INJECTION INTRAVENOUS PRN
Status: DISCONTINUED | OUTPATIENT
Start: 2023-11-11 | End: 2023-11-14 | Stop reason: HOSPADM

## 2023-11-11 RX ORDER — ACETAMINOPHEN 650 MG/1
650 SUPPOSITORY RECTAL EVERY 6 HOURS PRN
Status: DISCONTINUED | OUTPATIENT
Start: 2023-11-11 | End: 2023-11-14 | Stop reason: HOSPADM

## 2023-11-11 RX ORDER — FUROSEMIDE 10 MG/ML
20 INJECTION INTRAMUSCULAR; INTRAVENOUS ONCE
Status: COMPLETED | OUTPATIENT
Start: 2023-11-11 | End: 2023-11-11

## 2023-11-11 RX ORDER — ASPIRIN 81 MG/1
324 TABLET, CHEWABLE ORAL ONCE
Status: COMPLETED | OUTPATIENT
Start: 2023-11-11 | End: 2023-11-11

## 2023-11-11 RX ORDER — ONDANSETRON 2 MG/ML
4 INJECTION INTRAMUSCULAR; INTRAVENOUS EVERY 6 HOURS PRN
Status: DISCONTINUED | OUTPATIENT
Start: 2023-11-11 | End: 2023-11-14 | Stop reason: HOSPADM

## 2023-11-11 RX ORDER — LEVALBUTEROL INHALATION SOLUTION 0.63 MG/3ML
0.63 SOLUTION RESPIRATORY (INHALATION) EVERY 6 HOURS PRN
Status: DISCONTINUED | OUTPATIENT
Start: 2023-11-11 | End: 2023-11-14 | Stop reason: HOSPADM

## 2023-11-11 RX ORDER — SODIUM CHLORIDE 0.9 % (FLUSH) 0.9 %
5-40 SYRINGE (ML) INJECTION EVERY 12 HOURS SCHEDULED
Status: DISCONTINUED | OUTPATIENT
Start: 2023-11-11 | End: 2023-11-14 | Stop reason: HOSPADM

## 2023-11-11 RX ORDER — DILTIAZEM HYDROCHLORIDE 5 MG/ML
20 INJECTION INTRAVENOUS ONCE
Status: COMPLETED | OUTPATIENT
Start: 2023-11-11 | End: 2023-11-11

## 2023-11-11 RX ORDER — MAGNESIUM SULFATE IN WATER 40 MG/ML
2000 INJECTION, SOLUTION INTRAVENOUS PRN
Status: DISCONTINUED | OUTPATIENT
Start: 2023-11-11 | End: 2023-11-14 | Stop reason: HOSPADM

## 2023-11-11 RX ORDER — PANTOPRAZOLE SODIUM 40 MG/1
40 TABLET, DELAYED RELEASE ORAL
Status: DISCONTINUED | OUTPATIENT
Start: 2023-11-12 | End: 2023-11-14 | Stop reason: HOSPADM

## 2023-11-11 RX ORDER — BUDESONIDE AND FORMOTEROL FUMARATE DIHYDRATE 160; 4.5 UG/1; UG/1
2 AEROSOL RESPIRATORY (INHALATION)
Status: DISCONTINUED | OUTPATIENT
Start: 2023-11-11 | End: 2023-11-14 | Stop reason: HOSPADM

## 2023-11-11 RX ORDER — POLYETHYLENE GLYCOL 3350 17 G/17G
17 POWDER, FOR SOLUTION ORAL DAILY PRN
Status: DISCONTINUED | OUTPATIENT
Start: 2023-11-11 | End: 2023-11-14 | Stop reason: HOSPADM

## 2023-11-11 RX ORDER — ONDANSETRON 4 MG/1
4 TABLET, ORALLY DISINTEGRATING ORAL EVERY 8 HOURS PRN
Status: DISCONTINUED | OUTPATIENT
Start: 2023-11-11 | End: 2023-11-14 | Stop reason: HOSPADM

## 2023-11-11 RX ORDER — SODIUM CHLORIDE 0.9 % (FLUSH) 0.9 %
5-40 SYRINGE (ML) INJECTION PRN
Status: DISCONTINUED | OUTPATIENT
Start: 2023-11-11 | End: 2023-11-14 | Stop reason: HOSPADM

## 2023-11-11 RX ORDER — IPRATROPIUM BROMIDE AND ALBUTEROL SULFATE 2.5; .5 MG/3ML; MG/3ML
1 SOLUTION RESPIRATORY (INHALATION)
Status: DISCONTINUED | OUTPATIENT
Start: 2023-11-11 | End: 2023-11-11

## 2023-11-11 RX ORDER — ACETAMINOPHEN 325 MG/1
650 TABLET ORAL EVERY 6 HOURS PRN
Status: DISCONTINUED | OUTPATIENT
Start: 2023-11-11 | End: 2023-11-14 | Stop reason: HOSPADM

## 2023-11-11 RX ORDER — SODIUM CHLORIDE 9 MG/ML
INJECTION, SOLUTION INTRAVENOUS PRN
Status: DISCONTINUED | OUTPATIENT
Start: 2023-11-11 | End: 2023-11-14 | Stop reason: HOSPADM

## 2023-11-11 RX ORDER — LISINOPRIL 5 MG/1
5 TABLET ORAL DAILY
Status: DISCONTINUED | OUTPATIENT
Start: 2023-11-12 | End: 2023-11-12

## 2023-11-11 RX ORDER — ATORVASTATIN CALCIUM 40 MG/1
40 TABLET, FILM COATED ORAL NIGHTLY
Status: DISCONTINUED | OUTPATIENT
Start: 2023-11-11 | End: 2023-11-14 | Stop reason: HOSPADM

## 2023-11-11 RX ADMIN — IPRATROPIUM BROMIDE AND ALBUTEROL SULFATE 1 DOSE: 2.5; .5 SOLUTION RESPIRATORY (INHALATION) at 19:59

## 2023-11-11 RX ADMIN — IOPAMIDOL 100 ML: 755 INJECTION, SOLUTION INTRAVENOUS at 12:52

## 2023-11-11 RX ADMIN — METOPROLOL TARTRATE 25 MG: 25 TABLET ORAL at 19:01

## 2023-11-11 RX ADMIN — DILTIAZEM HYDROCHLORIDE 5 MG/HR: 5 INJECTION, SOLUTION INTRAVENOUS at 11:46

## 2023-11-11 RX ADMIN — DESMOPRESSIN ACETATE 40 MG: 0.2 TABLET ORAL at 21:02

## 2023-11-11 RX ADMIN — CIPROFLOXACIN HYDROCHLORIDE 500 MG: 500 TABLET, FILM COATED ORAL at 15:53

## 2023-11-11 RX ADMIN — BUDESONIDE AND FORMOTEROL FUMARATE DIHYDRATE 2 PUFF: 160; 4.5 AEROSOL RESPIRATORY (INHALATION) at 20:00

## 2023-11-11 RX ADMIN — ONDANSETRON 4 MG: 2 INJECTION INTRAMUSCULAR; INTRAVENOUS at 20:05

## 2023-11-11 RX ADMIN — DILTIAZEM HYDROCHLORIDE 20 MG: 5 INJECTION, SOLUTION INTRAVENOUS at 11:22

## 2023-11-11 RX ADMIN — APIXABAN 5 MG: 5 TABLET, FILM COATED ORAL at 21:02

## 2023-11-11 RX ADMIN — SODIUM CHLORIDE, PRESERVATIVE FREE 10 ML: 5 INJECTION INTRAVENOUS at 20:05

## 2023-11-11 RX ADMIN — DILTIAZEM HYDROCHLORIDE 20 MG: 5 INJECTION, SOLUTION INTRAVENOUS at 11:04

## 2023-11-11 RX ADMIN — FUROSEMIDE 20 MG: 10 INJECTION, SOLUTION INTRAMUSCULAR; INTRAVENOUS at 16:49

## 2023-11-11 RX ADMIN — ASPIRIN 324 MG: 81 TABLET, CHEWABLE ORAL at 11:01

## 2023-11-11 RX ADMIN — DILTIAZEM HYDROCHLORIDE 7.5 MG/HR: 5 INJECTION, SOLUTION INTRAVENOUS at 19:28

## 2023-11-11 ASSESSMENT — HEART SCORE: ECG: 1

## 2023-11-11 ASSESSMENT — ENCOUNTER SYMPTOMS: SHORTNESS OF BREATH: 1

## 2023-11-11 NOTE — ED NOTES
ED to inpatient nurses report      Chief Complaint:  Chief Complaint   Patient presents with    Shortness of Breath     Present to ED from: home    MOA:     LOC: alert and orientated to name, place, date  Mobility: Independent  Oxygen Baseline: RA    Current needs required: none  Pending ED orders: none  Present condition: stable    Why did the patient come to the ED? Pt presents to the ER via triage in wheelchair. Pt able to ambulate to bed from wheelchair on own power. Pt c/o shortness of breath and chest heaviness that has been going off and on for weeks. Pt on arrival to ER denies any N&V or diaphoresis. Pt is c/o slight chest pain. Pt having to sit straight up to breath. Pt is 95% on RA. Pt denies any chest palpitations. Pt states she takes blood thinners with hx of a-fib 10 years ago but got an ablasion. Pt placed on bedside cardiac monitoring, ekg taken, line established, labs drawn. What is the plan? Cardiac consult  Any procedures or intervention occur?  CT scan, x-ray  Any safety concerns?? none    Mental Status:  Level of Consciousness: Alert (0)    Psych Assessment:   Psychosocial  Psychosocial (WDL): Within Defined Limits  Vital signs   Vitals:    11/11/23 1153 11/11/23 1158 11/11/23 1513 11/11/23 1528   BP:  105/82 111/70 120/75   Pulse: 78 80 59 68   Resp: 29 17 21 18   Temp:       TempSrc:       SpO2: 93% 97% 94% 94%        Vitals:  Patient Vitals for the past 24 hrs:   BP Temp Temp src Pulse Resp SpO2   11/11/23 1528 120/75 -- -- 68 18 94 %   11/11/23 1513 111/70 -- -- 59 21 94 %   11/11/23 1158 105/82 -- -- 80 17 97 %   11/11/23 1153 -- -- -- 78 29 93 %   11/11/23 1148 112/61 -- -- 87 20 92 %   11/11/23 1138 -- -- -- 73 20 92 %   11/11/23 1128 102/64 -- -- 81 23 94 %   11/11/23 1119 (!) 137/110 -- -- (!) 147 27 94 %   11/11/23 1106 -- -- -- (!) 146 (!) 31 94 %   11/11/23 1059 (!) 146/108 -- -- (!) 145 (!) 32 90 %   11/11/23 1057 -- -- -- (!) 128 10 94 %   11/11/23 1056 -- -- -- (!) 147 26 95 %

## 2023-11-11 NOTE — ED NOTES
The following labs were labeled with appropriate pt sticker and tubed to lab:     [x] Blue     [x] Lavender   [] on ice  [x] Green/yellow  [x] Green/black [] on ice  [] Ahmad Vianney  [] on ice  [] Yellow  [] Red  [] Pink  [] Type/ Screen  [] ABG  [] VBG    [] COVID-19 swab    [] Rapid  [] PCR  [] Flu swab  [] Peds Viral Panel     [] Urine Sample  [] Fecal Sample  [] Pelvic Cultures  [] Blood Cultures  [] X 2  [] STREP Cultures       Sonu Enrique RN  11/11/23 5966

## 2023-11-11 NOTE — ED PROVIDER NOTES
[address] ED  Emergency Department Encounter  Emergency Medicine Resident     Pt Name:Chantell Way  MRN: [id number]  [facility] [place] 1945  Date of evaluation: 11/11/23  PCP:  JUDAH Mendieta CNP  Note Started: 10:43 AM EST      CHIEF COMPLAINT       Chief Complaint   Patient presents with    Shortness of Breath       HISTORY OF PRESENT ILLNESS  (Location/Symptom, Timing/Onset, Context/Setting, Quality, Duration, Modifying Factors, Severity.)      Mis Wellington is a 78 y.o. female who presents with Shortness of breath. Patient states that she has been short of breath for the past week constantly. Has had intermittent chest pain that has only lasted a few seconds. States that she had lower abdominal pain earlier today with 1 episode of emesis. States that she has been worked up by her physician for quadrant ultrasound that showed possible sludge in the gallbladder. Patient states that she has had a prior history of A-fib and ablation 10 years ago and has not been in A-fib since. Is currently on Eliquis. States that she takes metoprolol. 3 of abdominal aortic aneurysm. States that she does have a history of breast cancer but has not had any treatments in many years. PAST MEDICAL / SURGICAL / SOCIAL / FAMILY HISTORY      has a past medical history of Aneurysm of abdominal aorta ([address]), Atrial fibrillation ([address]), Cancer ([address]), ETD (eustachian tube dysfunction), Facial asymmetries, Fracture, ankle, Gastric ulcer, History of radiation therapy, Hx of cardiac cath, Hx of gastroenteritis, Hyperlipidemia, Hypertension, IBS (irritable bowel syndrome), Jaw pain, non-TMJ, Mammogram abnormal, Mitral valve prolapse syndrome, and Pregnancy, incidental.       has a past surgical history that includes Colonoscopy (Complete Colonoscopy for Polyp Removal); transesophageal echocardiogram (01/11/2018); other surgical history (01/11/2018); Appendectomy;  Upper gastrointestinal endoscopy; with Keflex. We will give her dose of Keflex here. Believe that the patient was told to be admitted to the hospital for her A-fib. She is currently on diltiazem drip heart rate is 110s. [GI]   1508 Discussed case with cardiology who like the patient to continue to be on a diltiazem drip and they will see her in the morning. Discussed case with internal medicine as well who will be admitting the patient. [GI]   1924 History: 1  EC  Patient Age: 1  *Risk factors for Atherosclerotic disease: Hypertension; Hypercholesterolemia  Risk Factors: 1  Troponin: 1  Heart Score Total: 6   [GI]      ED Course User Index  [GI] Daphne Craft DO       PROCEDURES:      CONSULTS:  IP CONSULT TO CARDIOLOGY  IP CONSULT TO INTERNAL MEDICINE  IP CONSULT TO CASE MANAGEMENT    CRITICAL CARE:  There was significant risk of life threatening deterioration of patient's condition requiring my direct management. Critical care time  minutes, excluding any documented procedures. FINAL IMPRESSION      1. Paroxysmal atrial fibrillation ([address])          DISPOSITION / PLAN     DISPOSITION Admitted 2023 03:27:55 PM      PATIENT REFERRED TO:  No follow-up provider specified.     DISCHARGE MEDICATIONS:  New Prescriptions    No medications on file       Daphne Craft DO  Emergency Medicine Resident    (Please note that portions of this note were completed with a voice recognition program.  Efforts were made to edit the dictations but occasionally words are mis-transcribed.)       Daphne Craft DO  Resident  23 [phone]

## 2023-11-11 NOTE — ED NOTES
Pt presents to the ER via triage in wheelchair. Pt able to ambulate to bed from wheelchair on own power. Pt c/o shortness of breath and chest heaviness that has been going off and on for weeks. Pt on arrival to ER denies any N&V or diaphoresis. Pt is c/o slight chest pain. Pt having to sit straight up to breath. Pt is 95% on RA. Pt denies any chest palpitations. Pt states she takes blood thinners with hx of a-fib 10 years ago but got an ablasion. Pt placed on bedside cardiac monitoring, ekg taken, line established, labs drawn.       Christian Galdamez, RN  11/11/23 7912

## 2023-11-11 NOTE — ED NOTES
Patient registered with the social security number given to nurse. Patient verified identity with 2 identifying factors. Patient states that she is short of breath, and had to sleep sitting up last night. Patient is clammy to the touch. O2 sats in triage 99 , patient has a history of afib .      Jr Daniels RN  11/11/23 9520

## 2023-11-11 NOTE — ED NOTES
The following labs were labeled with appropriate pt sticker and tubed to lab:     [] Blue     [] Lavender   [] on ice  [] Green/yellow  [] Green/black [] on ice  [] Elva Jacqueline  [] on ice  [] Yellow  [] Red  [] Pink  [] Type/ Screen  [] ABG  [] VBG    [] COVID-19 swab    [] Rapid  [] PCR  [] Flu swab  [] Peds Viral Panel     [x] Urine Sample  [] Fecal Sample  [] Pelvic Cultures  [] Blood Cultures  [] X 2  [] STREP Cultures       Deepak Goodwin RN  11/11/23 0135

## 2023-11-11 NOTE — PROGRESS NOTES
Attending Physician Statement  I have discussed the case, including pertinent history and exam findings with the resident and the team.  I have seen and examined the patient and the key elements of the encounter have been performed by me. I agree with the assessment, plan and orders as documented by the resident. Review of Systems:   In addition to the pertinent positives and negatives as stated within HPI and the review of systems as documented in their notes, all other systems were reviewed when able to and are reported negative. 78years old female presented because of coughing and shortness of breath. Found to be in A-fib with RVR. Patient is on Eliquis and metoprolol at home. denies use of any over-the-counter nasal decongestants because of her recent history  EKG has not been scanned into the system yet but is nonacute except for A-fib with RVR and nonspecific ST changes per ER physician  Monitor QTc specially given antibiotics for UTI  Outpatient follow-up for pulmonary nodule through PCP  Check echocardiogram  Currently heart rate controlled but cardizem drip was stopped in ED. We will order orals, so as not to go back into RVR.        Britni Pablo MD, [address] [place] [address]  Attending Physician, Internal Medicine Service    Internal Medicine Residency Program  11/11/2023, 11:43 PM

## 2023-11-11 NOTE — PROGRESS NOTES
Direct oral anticoagulant (DOAC) - Initial Pharmacy Review  New start? No, home med  DOAC/dose: apixaban 5mg 2 times daily  Indication: A Fib/A Flutter    Recent Labs     11/11/23  1120   HGB 14.3   HCT 44.1        No results for input(s): \"INR\" in the last 72 hours. Recent Labs     11/11/23  1120   CREATININE 0.7     CrCl cannot be calculated (Unknown ideal weight.). Significant Drug-Drug Interactions: No interactions/no new drug interactions identified requiring action. Notes: No obvious intervention needed.

## 2023-11-12 PROBLEM — I48.0 PAROXYSMAL ATRIAL FIBRILLATION (HCC): Status: ACTIVE | Noted: 2023-11-11

## 2023-11-12 LAB
ANION GAP SERPL CALCULATED.3IONS-SCNC: 11 MMOL/L (ref 9–17)
BASOPHILS # BLD: 0.05 K/UL (ref 0–0.2)
BASOPHILS NFR BLD: 1 % (ref 0–2)
BUN SERPL-MCNC: 10 MG/DL (ref 8–23)
CALCIUM SERPL-MCNC: 8.8 MG/DL (ref 8.6–10.4)
CHLORIDE SERPL-SCNC: 101 MMOL/L (ref 98–107)
CO2 SERPL-SCNC: 26 MMOL/L (ref 20–31)
CREAT SERPL-MCNC: 0.7 MG/DL (ref 0.5–0.9)
EOSINOPHIL # BLD: 0.16 K/UL (ref 0–0.44)
EOSINOPHILS RELATIVE PERCENT: 2 % (ref 1–4)
ERYTHROCYTE [DISTWIDTH] IN BLOOD BY AUTOMATED COUNT: 13 % (ref 11.8–14.4)
GFR SERPL CREATININE-BSD FRML MDRD: >60 ML/MIN/1.73M2
GLUCOSE BLD-MCNC: 109 MG/DL (ref 65–105)
GLUCOSE BLD-MCNC: 111 MG/DL (ref 65–105)
GLUCOSE BLD-MCNC: 129 MG/DL (ref 65–105)
GLUCOSE BLD-MCNC: 98 MG/DL (ref 65–105)
GLUCOSE SERPL-MCNC: 95 MG/DL (ref 70–99)
HCT VFR BLD AUTO: 37.9 % (ref 36.3–47.1)
HGB BLD-MCNC: 12.1 G/DL (ref 11.9–15.1)
IMM GRANULOCYTES # BLD AUTO: 0.03 K/UL (ref 0–0.3)
IMM GRANULOCYTES NFR BLD: 0 %
LYMPHOCYTES NFR BLD: 1.21 K/UL (ref 1.1–3.7)
LYMPHOCYTES RELATIVE PERCENT: 14 % (ref 24–43)
MCH RBC QN AUTO: 31.8 PG (ref 25.2–33.5)
MCHC RBC AUTO-ENTMCNC: 31.9 G/DL (ref 28.4–34.8)
MCV RBC AUTO: 99.5 FL (ref 82.6–102.9)
MICROORGANISM SPEC CULT: NORMAL
MONOCYTES NFR BLD: 0.71 K/UL (ref 0.1–1.2)
MONOCYTES NFR BLD: 8 % (ref 3–12)
NEUTROPHILS NFR BLD: 75 % (ref 36–65)
NEUTS SEG NFR BLD: 6.54 K/UL (ref 1.5–8.1)
NRBC BLD-RTO: 0 PER 100 WBC
PLATELET # BLD AUTO: 186 K/UL (ref 138–453)
PMV BLD AUTO: 9.5 FL (ref 8.1–13.5)
POTASSIUM SERPL-SCNC: 3.7 MMOL/L (ref 3.7–5.3)
RBC # BLD AUTO: 3.81 M/UL (ref 3.95–5.11)
SODIUM SERPL-SCNC: 138 MMOL/L (ref 135–144)
SPECIMEN DESCRIPTION: NORMAL
WBC OTHER # BLD: 8.7 K/UL (ref 3.5–11.3)

## 2023-11-12 PROCEDURE — 2580000003 HC RX 258

## 2023-11-12 PROCEDURE — 94640 AIRWAY INHALATION TREATMENT: CPT

## 2023-11-12 PROCEDURE — 2500000003 HC RX 250 WO HCPCS

## 2023-11-12 PROCEDURE — 6370000000 HC RX 637 (ALT 250 FOR IP)

## 2023-11-12 PROCEDURE — 36415 COLL VENOUS BLD VENIPUNCTURE: CPT

## 2023-11-12 PROCEDURE — 2700000000 HC OXYGEN THERAPY PER DAY

## 2023-11-12 PROCEDURE — 99232 SBSQ HOSP IP/OBS MODERATE 35: CPT | Performed by: INTERNAL MEDICINE

## 2023-11-12 PROCEDURE — 6360000002 HC RX W HCPCS: Performed by: STUDENT IN AN ORGANIZED HEALTH CARE EDUCATION/TRAINING PROGRAM

## 2023-11-12 PROCEDURE — 6360000002 HC RX W HCPCS

## 2023-11-12 PROCEDURE — 94761 N-INVAS EAR/PLS OXIMETRY MLT: CPT

## 2023-11-12 PROCEDURE — 85025 COMPLETE CBC W/AUTO DIFF WBC: CPT

## 2023-11-12 PROCEDURE — 80048 BASIC METABOLIC PNL TOTAL CA: CPT

## 2023-11-12 PROCEDURE — 82947 ASSAY GLUCOSE BLOOD QUANT: CPT

## 2023-11-12 PROCEDURE — 2060000000 HC ICU INTERMEDIATE R&B

## 2023-11-12 PROCEDURE — 6370000000 HC RX 637 (ALT 250 FOR IP): Performed by: INTERNAL MEDICINE

## 2023-11-12 PROCEDURE — 99222 1ST HOSP IP/OBS MODERATE 55: CPT | Performed by: INTERNAL MEDICINE

## 2023-11-12 RX ORDER — POTASSIUM CHLORIDE 20 MEQ/1
20 TABLET, EXTENDED RELEASE ORAL ONCE
Status: COMPLETED | OUTPATIENT
Start: 2023-11-12 | End: 2023-11-12

## 2023-11-12 RX ORDER — DIGOXIN 0.25 MG/ML
250 INJECTION INTRAMUSCULAR; INTRAVENOUS ONCE
Status: COMPLETED | OUTPATIENT
Start: 2023-11-12 | End: 2023-11-12

## 2023-11-12 RX ORDER — DIGOXIN 0.25 MG/ML
125 INJECTION INTRAMUSCULAR; INTRAVENOUS ONCE
Status: DISCONTINUED | OUTPATIENT
Start: 2023-11-12 | End: 2023-11-12

## 2023-11-12 RX ORDER — FUROSEMIDE 10 MG/ML
20 INJECTION INTRAMUSCULAR; INTRAVENOUS ONCE
Status: COMPLETED | OUTPATIENT
Start: 2023-11-12 | End: 2023-11-12

## 2023-11-12 RX ORDER — DIGOXIN 0.25 MG/ML
250 INJECTION INTRAMUSCULAR; INTRAVENOUS EVERY 6 HOURS
Status: DISCONTINUED | OUTPATIENT
Start: 2023-11-12 | End: 2023-11-12

## 2023-11-12 RX ADMIN — METOPROLOL TARTRATE 25 MG: 25 TABLET ORAL at 09:07

## 2023-11-12 RX ADMIN — SODIUM CHLORIDE, PRESERVATIVE FREE 10 ML: 5 INJECTION INTRAVENOUS at 09:07

## 2023-11-12 RX ADMIN — APIXABAN 5 MG: 5 TABLET, FILM COATED ORAL at 21:46

## 2023-11-12 RX ADMIN — BUDESONIDE AND FORMOTEROL FUMARATE DIHYDRATE 2 PUFF: 160; 4.5 AEROSOL RESPIRATORY (INHALATION) at 08:43

## 2023-11-12 RX ADMIN — ONDANSETRON 4 MG: 2 INJECTION INTRAMUSCULAR; INTRAVENOUS at 19:32

## 2023-11-12 RX ADMIN — FUROSEMIDE 20 MG: 10 INJECTION, SOLUTION INTRAMUSCULAR; INTRAVENOUS at 12:51

## 2023-11-12 RX ADMIN — ONDANSETRON 4 MG: 2 INJECTION INTRAMUSCULAR; INTRAVENOUS at 12:28

## 2023-11-12 RX ADMIN — Medication 1000 MG: at 09:07

## 2023-11-12 RX ADMIN — POTASSIUM CHLORIDE 40 MEQ: 1500 TABLET, EXTENDED RELEASE ORAL at 13:19

## 2023-11-12 RX ADMIN — DILTIAZEM HYDROCHLORIDE 2.5 MG/HR: 5 INJECTION, SOLUTION INTRAVENOUS at 21:47

## 2023-11-12 RX ADMIN — METOPROLOL TARTRATE 25 MG: 25 TABLET ORAL at 21:46

## 2023-11-12 RX ADMIN — DESMOPRESSIN ACETATE 40 MG: 0.2 TABLET ORAL at 21:46

## 2023-11-12 RX ADMIN — BENZOCAINE AND MENTHOL 1 LOZENGE: 15; 3.6 LOZENGE ORAL at 22:29

## 2023-11-12 RX ADMIN — POTASSIUM CHLORIDE 20 MEQ: 1500 TABLET, EXTENDED RELEASE ORAL at 13:27

## 2023-11-12 RX ADMIN — DIGOXIN 250 MCG: 0.25 INJECTION INTRAMUSCULAR; INTRAVENOUS at 11:00

## 2023-11-12 RX ADMIN — APIXABAN 5 MG: 5 TABLET, FILM COATED ORAL at 09:07

## 2023-11-12 RX ADMIN — SODIUM CHLORIDE, PRESERVATIVE FREE 10 ML: 5 INJECTION INTRAVENOUS at 21:46

## 2023-11-12 RX ADMIN — BUDESONIDE AND FORMOTEROL FUMARATE DIHYDRATE 2 PUFF: 160; 4.5 AEROSOL RESPIRATORY (INHALATION) at 20:41

## 2023-11-12 ASSESSMENT — PAIN DESCRIPTION - LOCATION: LOCATION: THROAT

## 2023-11-12 ASSESSMENT — PAIN SCALES - GENERAL: PAINLEVEL_OUTOF10: 4

## 2023-11-12 ASSESSMENT — PAIN DESCRIPTION - DESCRIPTORS: DESCRIPTORS: SORE

## 2023-11-12 NOTE — H&P
[address]     Department of Internal Medicine - Staff Internal Medicine Teaching Service          ADMISSION NOTE/HISTORY AND PHYSICAL EXAMINATION   Date: 11/11/2023  Patient Name: Darren Ontiveros  Date of admission: 11/11/2023 10:43 AM  YOB: 1945  PCP: JUDAH Caban CNP  History Obtained From:  patient, electronic medical record    CHIEF COMPLAINT     Chief complaint: Shortness of breath    HISTORY OF PRESENTING ILLNESS     The patient is a pleasant 78 y.o. female with a past medical history of A-fib s/p 2018 ablation, hyperlipidemia, hypertension, smoking, right-sided breast cancer s/p radiation and right lumpectomy in 2011 presents with a chief complaint of 1-week history of shortness of breath worsened by laying flat. No prior history of CHF. Reports mild improvement in shortness of breath after taking dose of her 's furosemide 80 mg pill. No lower extremity edema present. Associated with nonproductive cough. Denies any fevers, chills, sore throat, sick contacts, abdominal pain, urinary symptoms, constipation, or diarrhea. Bilateral expiratory wheezing throughout all lung fields appreciated on auscultation of the chest.  Denies history of asthma or COPD or inhaler use. PERC rule positive. CT PE showed no PE, however found to have bilateral pleural effusion L>R, and an incidental 3 mm left upper lobe nodule. She also reports intermittent episodes of nonradiating, sharp, stabbing left-sided chest pain lasting several minutes that are unprovoked. Chest pain is not associated with nausea, vomiting, shortness of breath, diaphoresis, or weakness. Endorses palpitations. States that she has not experienced chest pain like this before. EKG shows A-fib with RVR, QTc greater than 500. She received diltiazem 20 x2 doses in the ED with minimal response, so was started on IV Cardizem drip. Troponin 14, repeat 15. CBC, BMP, lipase unremarkable. lung fields   -No history of asthma or COPD   -Given longstanding history of smoking, likely the patient may have a component of COPD versus CHF. Concern for SOB 2/2 CHF due to typical symptoms worsening SOB while laying flat and patient reported improvement s/p dose of 's Lasix   -We will treat as COPD exacerbation for now. Will start on Symbicort, prednisone 40, and xonepex PRN    UTI   - U/A: Large LES, few bacteria   -Started on Rocephin   -Follow-up urine culture    Hypertension   -Home dose lisinopril 5 mg resumed    Lung nodule   -Incidental 3 mm left upper lobe lung nodule found on CT PE   -Concern for malignancy given that patient is a current smoker with a 50-pack-year smoking history   -Will need outpatient follow-up imaging to monitor progression of nodule    Smoker   -Nicotine patch ordered      DVT ppx: Eliquis  GI ppx: Protonix    PT/OT/SW consulted  Discharge Planning: CM on board    Sheila Mccullough MD  Internal Medicine Resident, PGY-1  [facility];  [place], [place]  11/11/2023, 7:06 PM

## 2023-11-12 NOTE — PROGRESS NOTES
[facility]  Internal Medicine Teaching Residency Program  Inpatient Daily Progress Note  ______________________________________________________________________________    Patient: Lily Ortega  YOB: 1945   GOI:[id number]    Acct: [de-identified]     Room: [address] [place] [address]  Admit date: 11/11/2023  Today's date: 11/12/23  Number of days in the hospital: 1    SUBJECTIVE   Admitting Diagnosis: A-fib ([address])  CC: SOB  Pt examined at bedside. Chart & results reviewed. Afebrile. A-fib with RVR overnight, started on Cardizem drip with rate control. Patient reports no complaints this morning. N.p.o. tomorrow for cardioversion.,    ROS:  Constitutional:  negative for chills, fevers, sweats  Respiratory:  negative for cough, dyspnea on exertion, hemoptysis, shortness of breath, wheezing  Cardiovascular:  negative for chest pain, chest pressure/discomfort, lower extremity edema, palpitations  Gastrointestinal:  negative for abdominal pain, constipation, diarrhea, nausea, vomiting  Neurological:  negative for dizziness, headache  BRIEF HISTORY     The patient is a pleasant 78 y.o. female with a past medical history of A-fib s/p 2018 ablation, hyperlipidemia, hypertension, smoking, right-sided breast cancer s/p radiation and right lumpectomy in 2011 presents with a chief complaint of 1-week history of shortness of breath worsened by laying flat. No prior history of CHF. Reports mild improvement in shortness of breath after taking dose of her 's furosemide 80 mg pill. No lower extremity edema present. Associated with nonproductive cough. PERC rule positive. CT PE showed no PE, however found to have bilateral pleural effusion L>R, and an incidental 3 mm left upper lobe nodule. She also reports intermittent episodes of nonradiating, sharp, stabbing left-sided chest pain lasting several minutes that are unprovoked.   Chest pain is not associated with nausea, months, no further follow-up is recommended. These guidelines do not apply to immunocompromised patients and patients with cancer. Follow up in patients with significant comorbidities as clinically warranted. For lung cancer screening, adhere to Lung-RADS guidelines. Reference: Radiology. 2017; 284(1):228-43. CTA CHEST W WO CONTRAST    Result Date: 11/11/2023  1. No evidence of thoracic aortic aneurysm, dissection, or intramural hematoma. 2. No evidence of pulmonary embolus. 3. No acute abnormality in the abdomen or pelvis. 4. Small to moderate left pleural effusion and small right pleural effusion. 5. Sigmoid diverticulosis. No evidence of diverticulitis. 6. Incidental 3 mm left upper lobe nodule. Please see below for follow-up recommendations. RECOMMENDATIONS: Pathology: 3 mm left solid pulmonary nodule within the upper lobe. Per Fleischner Society Guidelines, a non-contrast Chest CT at 12 months is optional. If performed and the nodule is stable at 12 months, no further follow-up is recommended. These guidelines do not apply to immunocompromised patients and patients with cancer. Follow up in patients with significant comorbidities as clinically warranted. For lung cancer screening, adhere to Lung-RADS guidelines. Reference: Radiology. 2017; 284(1):228-27. XR CHEST PORTABLE    Result Date: 11/11/2023  Cardiomegaly with interstitial edema and trace left pleural effusion. ASSESSMENT & PLAN     ASSESSMENT / PLAN:     A-fib with RVR             -S/p ablation in 2018             -On Lopressor 12.5 twice daily and Eliquis 5 twice daily at home             -Denies any subsequent episodes of A-fib s/p ablation in 2018             -TSH 2.72.              -No identifiable triggers such as alcohol use, stimulants, PNA, PE             -Lopressor increased to 25 BID.   Home dose Eliquis resumed             - Continue on Cardizem drip             -Cardiology consulted   -Dose of IV digoxin ordered   -N.p.o.

## 2023-11-12 NOTE — PROGRESS NOTES
Patient ambulated to bathroom and talked to  on phone. Patient became sob and heart rate went up to 145. Attempted to perfect serve and there is error in perfect serve at this time. Patients 8 pm metoprolol po given.

## 2023-11-12 NOTE — PLAN OF CARE
Problem: Discharge Planning  Goal: Discharge to home or other facility with appropriate resources  Outcome: Progressing  Flowsheets  Taken 11/11/2023 1930 by Daniel Rodriguez RN  Discharge to home or other facility with appropriate resources:   Identify barriers to discharge with patient and caregiver   Arrange for needed discharge resources and transportation as appropriate   Identify discharge learning needs (meds, wound care, etc)   Refer to discharge planning if patient needs post-hospital services based on physician order or complex needs related to functional status, cognitive ability or social support system      Problem: Safety - Adult  Goal: Free from fall injury  Outcome: Progressing     Problem: Respiratory - Adult  Goal: Achieves optimal ventilation and oxygenation  Outcome: Progressing     Problem: Cardiovascular - Adult  Goal: Absence of cardiac dysrhythmias or at baseline  Outcome: Progressing

## 2023-11-12 NOTE — PLAN OF CARE
Problem: Discharge Planning  Goal: Discharge to home or other facility with appropriate resources  11/12/2023 0919 by Luli Sorto RN  Outcome: Progressing  11/12/2023 0051 by Sean Lindquist RN  Outcome: Progressing  Flowsheets  Taken 11/11/2023 1930 by Sean Lindquist RN  Discharge to home or other facility with appropriate resources:   Identify barriers to discharge with patient and caregiver   Arrange for needed discharge resources and transportation as appropriate   Identify discharge learning needs (meds, wound care, etc)   Refer to discharge planning if patient needs post-hospital services based on physician order or complex needs related to functional status, cognitive ability or social support system  Taken 11/11/2023 1650 by Binu Lino RN  Discharge to home or other facility with appropriate resources: Identify barriers to discharge with patient and caregiver  Taken 11/11/2023 1645 by Radha Fiore RN  Discharge to home or other facility with appropriate resources:   Identify barriers to discharge with patient and caregiver   Arrange for needed discharge resources and transportation as appropriate   Identify discharge learning needs (meds, wound care, etc)   Refer to discharge planning if patient needs post-hospital services based on physician order or complex needs related to functional status, cognitive ability or social support system     Problem: ABCDS Injury Assessment  Goal: Absence of physical injury  11/12/2023 0919 by Luli Sorto RN  Outcome: Progressing  11/12/2023 0051 by Sean Lindquist RN  Outcome: Progressing  Flowsheets (Taken 11/11/2023 2000)  Absence of Physical Injury: Implement safety measures based on patient assessment     Problem: Safety - Adult  Goal: Free from fall injury  11/12/2023 0919 by Luli Sorto RN  Outcome: Progressing  11/12/2023 0051 by Sean Lindquist RN  Outcome: Progressing     Problem: Respiratory - Adult  Goal: Achieves

## 2023-11-12 NOTE — CONSULTS
Attestation signed by      Attending Physician Statement:    I have discussed the care of  Yudi Meza , including pertinent history and exam findings, with the Cardiology fellow/resident. I have seen and examined the patient and the key elements of all parts of the encounter have been performed by me. I agree with the assessment, plan and orders as documented by the fellow/resident, after I modified exam findings and plan of treatments, and the final version is my approved version of the assessment. Additional Comments: H/o Afib ablation 2018. Symptoms likely due to afib with RVR. Patient reports compliance with Eliquis. Continue IV diltiazem drip. Digoxin 0.25mg x1 IV. DC lisinopril. Recommend cardioversion tomorrow. TTE to be done after cardioversion. Consider addition of rhythmol after TTE results. Leonor Hennessy, [facility] Cardiology Cardiology    Consult / H&P               Today's Date: 11/12/2023  Patient Name: Yudi Meza  Date of admission: 11/11/2023 10:43 AM  Patient's age: 78 y.o., 1945  Admission Dx: Paroxysmal atrial fibrillation ([address]) [I48.0]  A-fib ([address]) [I48.91]    Reason for Consult:  Cardiac evaluation    Requesting Physician: Rubens Alvarenga MD    CHIEF COMPLAINT:  SOB    History Obtained From:  patient    HISTORY OF PRESENT ILLNESS:      The patient is a 78 y.o. female pmh A-fib status post 2018 ablation, hypertension, hyperlipidemia, history of smoking, right-sided breast cancer status post radiation and lumpectomy who is admitted to the hospital for shortness of breath. Apparently patient was short of breath and worsened whenever she tried laying flat. Patient states that she had mild improvement after taking a dose of her 's furosemide 80 mg pill. Cardiology consulted due to an episode of A-fib with RVR with associated chest pain that was nonradiating sharp stabbing left-sided.   Patient is currently on Cardizem drip due to nonresponsive doses of MD Jemima   omeprazole (PRILOSEC OTC) 20 MG tablet Take 1 tablet by mouth daily 11/3/15   Shiv Nieves MD      Current Facility-Administered Medications: [COMPLETED] dilTIAZem injection 20 mg, 20 mg, IntraVENous, Once **FOLLOWED BY** dilTIAZem 125 mg in sodium chloride 0.9 % 125 mL infusion, 2.5-15 mg/hr, IntraVENous, Continuous  sodium chloride flush 0.9 % injection 5-40 mL, 5-40 mL, IntraVENous, 2 times per day  sodium chloride flush 0.9 % injection 5-40 mL, 5-40 mL, IntraVENous, PRN  0.9 % sodium chloride infusion, , IntraVENous, PRN  potassium chloride (KLOR-CON M) extended release tablet 40 mEq, 40 mEq, Oral, PRN **OR** potassium bicarb-citric acid (EFFER-K) effervescent tablet 40 mEq, 40 mEq, Oral, PRN **OR** potassium chloride 10 mEq/100 mL IVPB (Peripheral Line), 10 mEq, IntraVENous, PRN  magnesium sulfate 2000 mg in 50 mL IVPB premix, 2,000 mg, IntraVENous, PRN  ondansetron (ZOFRAN-ODT) disintegrating tablet 4 mg, 4 mg, Oral, Q8H PRN **OR** ondansetron (ZOFRAN) injection 4 mg, 4 mg, IntraVENous, Q6H PRN  polyethylene glycol (GLYCOLAX) packet 17 g, 17 g, Oral, Daily PRN  acetaminophen (TYLENOL) tablet 650 mg, 650 mg, Oral, Q6H PRN **OR** acetaminophen (TYLENOL) suppository 650 mg, 650 mg, Rectal, Q6H PRN  apixaban (ELIQUIS) tablet 5 mg, 5 mg, Oral, BID  lisinopril (PRINIVIL;ZESTRIL) tablet 5 mg, 5 mg, Oral, Daily  pantoprazole (PROTONIX) tablet 40 mg, 40 mg, Oral, QAM AC  atorvastatin (LIPITOR) tablet 40 mg, 40 mg, Oral, Nightly  budesonide-formoterol (SYMBICORT) 160-4.5 MCG/ACT inhaler 2 puff, 2 puff, Inhalation, BID RT  metoprolol tartrate (LOPRESSOR) tablet 25 mg, 25 mg, Oral, BID  cefTRIAXone (ROCEPHIN) 1000 mg in sterile water 10 mL IV syringe, 1,000 mg, IntraVENous, Q24H  levalbuterol (XOPENEX) nebulization 0.63 mg, 0.63 mg, Nebulization, Q6H PRN    Allergies:  Penicillins and Sulfa antibiotics    Social History:   reports that she quit smoking about 14 years ago.  Her smoking use included

## 2023-11-12 NOTE — CARE COORDINATION
Case Management Assessment  Initial Evaluation    Date/Time of Evaluation: 11/12/2023 11:26 AM  Assessment Completed by: Kate Keith RN    If patient is discharged prior to next notation, then this note serves as note for discharge by case management. Patient Name: Mehdi Smallwood                   YOB: 1945  Diagnosis: Paroxysmal atrial fibrillation ([address]) [I48.0]  A-fib [facility]) [I48.91]                   Date / Time: 11/11/2023 10:43 AM    Patient Admission Status: Inpatient   Readmission Risk (Low < 19, Mod (19-27), High > 27): Readmission Risk Score: 6    Current PCP: JUDAH Araujo CNP  PCP verified by CM? Yes    Chart Reviewed: Yes      History Provided by: Patient  Patient Orientation: Alert and Oriented    Patient Cognition: Alert    Hospitalization in the last 30 days (Readmission):  No    If yes, Readmission Assessment in CM Navigator will be completed. Advance Directives:      Code Status: Full Code   Patient's Primary Decision Maker is: Patient Declined (Legal Next of Kin Remains as Decision Maker)    Primary Decision MakerTsandra Ted - Spouse - [phone]    Secondary Decision Maker: Silvana Salaspedrito  Child - [phone]    Discharge Planning:    Patient lives with: Spouse/Significant Other Type of Home: House  Primary Care Giver: Self  Patient Support Systems include: Spouse/Significant Other   Current Financial resources: Medicare  Current community resources: None  Current services prior to admission: None            Current DME:              Type of Home Care services:  None    ADLS  Prior functional level: Independent in ADLs/IADLs  Current functional level: Independent in ADLs/IADLs    PT AM-PAC:   /24  OT AM-PAC:   /24    Family can provide assistance at DC: Yes  Would you like Case Management to discuss the discharge plan with any other family members/significant others, and if so, who?  No  Plans to Return to Present Housing: Yes  Other Identified Issues/Barriers

## 2023-11-12 NOTE — PROGRESS NOTES
Writer spoke with IM resident and notified of BP 99/74(83). Cardizem gtt is running at the lowest rate, 2.5 mg/hr. Per IM resident, leave the drip running at the lowest rate and keep map >65.  Will continue to monitor VS.

## 2023-11-12 NOTE — PLAN OF CARE
Problem: Respiratory - Adult  Goal: Achieves optimal ventilation and oxygenation  11/12/2023 0847 by Jamarcus Martines RCP  Flowsheets (Taken 11/12/2023 5499)  Achieves optimal ventilation and oxygenation: Respiratory therapy support as indicated

## 2023-11-13 ENCOUNTER — APPOINTMENT (OUTPATIENT)
Age: 78
DRG: 309 | End: 2023-11-13
Payer: COMMERCIAL

## 2023-11-13 ENCOUNTER — APPOINTMENT (OUTPATIENT)
Dept: GENERAL RADIOLOGY | Age: 78
DRG: 309 | End: 2023-11-13
Payer: COMMERCIAL

## 2023-11-13 PROBLEM — M79.661 PAIN OF RIGHT CALF: Status: ACTIVE | Noted: 2023-11-13

## 2023-11-13 PROBLEM — M79.662 PAIN OF LEFT CALF: Status: ACTIVE | Noted: 2023-11-13

## 2023-11-13 PROBLEM — M79.662 PAIN OF LEFT CALF: Status: RESOLVED | Noted: 2023-11-13 | Resolved: 2023-11-13

## 2023-11-13 LAB
ANION GAP SERPL CALCULATED.3IONS-SCNC: 7 MMOL/L (ref 9–17)
BASOPHILS # BLD: 0.06 K/UL (ref 0–0.2)
BASOPHILS NFR BLD: 1 % (ref 0–2)
BUN SERPL-MCNC: 9 MG/DL (ref 8–23)
CALCIUM SERPL-MCNC: 8.7 MG/DL (ref 8.6–10.4)
CHLORIDE SERPL-SCNC: 101 MMOL/L (ref 98–107)
CO2 SERPL-SCNC: 28 MMOL/L (ref 20–31)
CREAT SERPL-MCNC: 0.7 MG/DL (ref 0.5–0.9)
ECHO AO ASC DIAM: 3.2 CM
ECHO AO ASCENDING AORTA INDEX: 1.68 CM/M2
ECHO AO ROOT DIAM: 3.1 CM
ECHO AO ROOT INDEX: 1.62 CM/M2
ECHO AV AREA PEAK VELOCITY: 2.6 CM2
ECHO AV AREA VTI: 2.9 CM2
ECHO AV AREA/BSA PEAK VELOCITY: 1.4 CM2/M2
ECHO AV AREA/BSA VTI: 1.5 CM2/M2
ECHO AV MEAN GRADIENT: 3 MMHG
ECHO AV MEAN VELOCITY: 0.8 M/S
ECHO AV PEAK GRADIENT: 4 MMHG
ECHO AV PEAK VELOCITY: 1 M/S
ECHO AV VELOCITY RATIO: 1.2
ECHO AV VTI: 18.2 CM
ECHO BSA: 1.97 M2
ECHO EST RA PRESSURE: 3 MMHG
ECHO LA AREA 2C: 18.6 CM2
ECHO LA AREA 4C: 18.8 CM2
ECHO LA DIAMETER INDEX: 2.3 CM/M2
ECHO LA DIAMETER: 4.4 CM
ECHO LA MAJOR AXIS: 5.8 CM
ECHO LA MINOR AXIS: 5.3 CM
ECHO LA TO AORTIC ROOT RATIO: 1.42
ECHO LA VOL BP: 51 ML (ref 22–52)
ECHO LA VOL MOD A2C: 51 ML (ref 22–52)
ECHO LA VOL MOD A4C: 50 ML (ref 22–52)
ECHO LA VOL/BSA BIPLANE: 27 ML/M2 (ref 16–34)
ECHO LA VOLUME INDEX MOD A2C: 27 ML/M2 (ref 16–34)
ECHO LA VOLUME INDEX MOD A4C: 26 ML/M2 (ref 16–34)
ECHO LV EDV A2C: 76 ML
ECHO LV EDV A4C: 104 ML
ECHO LV EDV INDEX A4C: 54 ML/M2
ECHO LV EDV NDEX A2C: 40 ML/M2
ECHO LV EJECTION FRACTION A2C: 60 %
ECHO LV EJECTION FRACTION A4C: 63 %
ECHO LV EJECTION FRACTION BIPLANE: 61 % (ref 55–100)
ECHO LV ESV A2C: 30 ML
ECHO LV ESV A4C: 38 ML
ECHO LV ESV INDEX A2C: 16 ML/M2
ECHO LV ESV INDEX A4C: 20 ML/M2
ECHO LV FRACTIONAL SHORTENING: 36 % (ref 28–44)
ECHO LV INTERNAL DIMENSION DIASTOLE INDEX: 2.04 CM/M2
ECHO LV INTERNAL DIMENSION DIASTOLIC: 3.9 CM (ref 3.9–5.3)
ECHO LV INTERNAL DIMENSION SYSTOLIC INDEX: 1.31 CM/M2
ECHO LV INTERNAL DIMENSION SYSTOLIC: 2.5 CM
ECHO LV IVSD: 1.1 CM (ref 0.6–0.9)
ECHO LV MASS 2D: 122.1 G (ref 67–162)
ECHO LV MASS INDEX 2D: 63.9 G/M2 (ref 43–95)
ECHO LV POSTERIOR WALL DIASTOLIC: 0.9 CM (ref 0.6–0.9)
ECHO LV RELATIVE WALL THICKNESS RATIO: 0.46
ECHO LVOT AREA: 2.3 CM2
ECHO LVOT AV VTI INDEX: 1.27
ECHO LVOT DIAM: 1.7 CM
ECHO LVOT MEAN GRADIENT: 3 MMHG
ECHO LVOT PEAK GRADIENT: 5 MMHG
ECHO LVOT PEAK VELOCITY: 1.2 M/S
ECHO LVOT STROKE VOLUME INDEX: 27.6 ML/M2
ECHO LVOT SV: 52.6 ML
ECHO LVOT VTI: 23.2 CM
ECHO MV AREA VTI: 3.4 CM2
ECHO MV LVOT VTI INDEX: 0.67
ECHO MV MAX VELOCITY: 1.6 M/S
ECHO MV MEAN GRADIENT: 4 MMHG
ECHO MV MEAN VELOCITY: 0.9 M/S
ECHO MV PEAK GRADIENT: 10 MMHG
ECHO MV VTI: 15.5 CM
ECHO PV MAX VELOCITY: 0.7 M/S
ECHO PV PEAK GRADIENT: 2 MMHG
ECHO RIGHT VENTRICULAR SYSTOLIC PRESSURE (RVSP): 24 MMHG
ECHO RV FREE WALL PEAK S': 12 CM/S
ECHO RV TAPSE: 1.8 CM (ref 1.7–?)
ECHO TV REGURGITANT MAX VELOCITY: 2.29 M/S
ECHO TV REGURGITANT PEAK GRADIENT: 21 MMHG
EKG ATRIAL RATE: 147 BPM
EKG Q-T INTERVAL: 332 MS
EKG QRS DURATION: 90 MS
EKG QTC CALCULATION (BAZETT): 515 MS
EKG R AXIS: 96 DEGREES
EKG T AXIS: 77 DEGREES
EKG VENTRICULAR RATE: 145 BPM
EOSINOPHIL # BLD: 0.23 K/UL (ref 0–0.44)
EOSINOPHILS RELATIVE PERCENT: 3 % (ref 1–4)
ERYTHROCYTE [DISTWIDTH] IN BLOOD BY AUTOMATED COUNT: 13.1 % (ref 11.8–14.4)
GFR SERPL CREATININE-BSD FRML MDRD: >60 ML/MIN/1.73M2
GLUCOSE BLD-MCNC: 115 MG/DL (ref 65–105)
GLUCOSE BLD-MCNC: 91 MG/DL (ref 65–105)
GLUCOSE BLD-MCNC: 96 MG/DL (ref 65–105)
GLUCOSE SERPL-MCNC: 102 MG/DL (ref 70–99)
HCT VFR BLD AUTO: 39.1 % (ref 36.3–47.1)
HGB BLD-MCNC: 12.7 G/DL (ref 11.9–15.1)
IMM GRANULOCYTES # BLD AUTO: <0.03 K/UL (ref 0–0.3)
IMM GRANULOCYTES NFR BLD: 0 %
LYMPHOCYTES NFR BLD: 1.22 K/UL (ref 1.1–3.7)
LYMPHOCYTES RELATIVE PERCENT: 16 % (ref 24–43)
MCH RBC QN AUTO: 32 PG (ref 25.2–33.5)
MCHC RBC AUTO-ENTMCNC: 32.5 G/DL (ref 28.4–34.8)
MCV RBC AUTO: 98.5 FL (ref 82.6–102.9)
MONOCYTES NFR BLD: 0.92 K/UL (ref 0.1–1.2)
MONOCYTES NFR BLD: 12 % (ref 3–12)
NEUTROPHILS NFR BLD: 68 % (ref 36–65)
NEUTS SEG NFR BLD: 5.09 K/UL (ref 1.5–8.1)
NRBC BLD-RTO: 0 PER 100 WBC
PLATELET # BLD AUTO: 187 K/UL (ref 138–453)
PMV BLD AUTO: 9.2 FL (ref 8.1–13.5)
POTASSIUM SERPL-SCNC: 4 MMOL/L (ref 3.7–5.3)
RBC # BLD AUTO: 3.97 M/UL (ref 3.95–5.11)
SODIUM SERPL-SCNC: 136 MMOL/L (ref 135–144)
WBC OTHER # BLD: 7.5 K/UL (ref 3.5–11.3)

## 2023-11-13 PROCEDURE — 6370000000 HC RX 637 (ALT 250 FOR IP)

## 2023-11-13 PROCEDURE — 97166 OT EVAL MOD COMPLEX 45 MIN: CPT

## 2023-11-13 PROCEDURE — 2700000000 HC OXYGEN THERAPY PER DAY

## 2023-11-13 PROCEDURE — 5A2204Z RESTORATION OF CARDIAC RHYTHM, SINGLE: ICD-10-PCS | Performed by: INTERNAL MEDICINE

## 2023-11-13 PROCEDURE — 6360000002 HC RX W HCPCS

## 2023-11-13 PROCEDURE — 94640 AIRWAY INHALATION TREATMENT: CPT

## 2023-11-13 PROCEDURE — 94761 N-INVAS EAR/PLS OXIMETRY MLT: CPT

## 2023-11-13 PROCEDURE — 2060000000 HC ICU INTERMEDIATE R&B

## 2023-11-13 PROCEDURE — 71045 X-RAY EXAM CHEST 1 VIEW: CPT

## 2023-11-13 PROCEDURE — 36415 COLL VENOUS BLD VENIPUNCTURE: CPT

## 2023-11-13 PROCEDURE — 85025 COMPLETE CBC W/AUTO DIFF WBC: CPT

## 2023-11-13 PROCEDURE — 93010 ELECTROCARDIOGRAM REPORT: CPT | Performed by: INTERNAL MEDICINE

## 2023-11-13 PROCEDURE — 93306 TTE W/DOPPLER COMPLETE: CPT

## 2023-11-13 PROCEDURE — 2580000003 HC RX 258

## 2023-11-13 PROCEDURE — 6360000002 HC RX W HCPCS: Performed by: INTERNAL MEDICINE

## 2023-11-13 PROCEDURE — 80048 BASIC METABOLIC PNL TOTAL CA: CPT

## 2023-11-13 PROCEDURE — 99232 SBSQ HOSP IP/OBS MODERATE 35: CPT | Performed by: INTERNAL MEDICINE

## 2023-11-13 PROCEDURE — 92960 CARDIOVERSION ELECTRIC EXT: CPT | Performed by: INTERNAL MEDICINE

## 2023-11-13 PROCEDURE — 93306 TTE W/DOPPLER COMPLETE: CPT | Performed by: INTERNAL MEDICINE

## 2023-11-13 PROCEDURE — 97535 SELF CARE MNGMENT TRAINING: CPT

## 2023-11-13 PROCEDURE — 82947 ASSAY GLUCOSE BLOOD QUANT: CPT

## 2023-11-13 RX ORDER — SODIUM CHLORIDE 0.9 % (FLUSH) 0.9 %
5-40 SYRINGE (ML) INJECTION PRN
Status: CANCELLED | OUTPATIENT
Start: 2023-11-13

## 2023-11-13 RX ORDER — SODIUM CHLORIDE 0.9 % (FLUSH) 0.9 %
5-40 SYRINGE (ML) INJECTION EVERY 12 HOURS SCHEDULED
Status: CANCELLED | OUTPATIENT
Start: 2023-11-13

## 2023-11-13 RX ORDER — SODIUM CHLORIDE 9 MG/ML
INJECTION, SOLUTION INTRAVENOUS PRN
Status: CANCELLED | OUTPATIENT
Start: 2023-11-13

## 2023-11-13 RX ADMIN — SODIUM CHLORIDE, PRESERVATIVE FREE 10 ML: 5 INJECTION INTRAVENOUS at 21:12

## 2023-11-13 RX ADMIN — BUDESONIDE AND FORMOTEROL FUMARATE DIHYDRATE 2 PUFF: 160; 4.5 AEROSOL RESPIRATORY (INHALATION) at 08:35

## 2023-11-13 RX ADMIN — ONDANSETRON 4 MG: 2 INJECTION INTRAMUSCULAR; INTRAVENOUS at 21:36

## 2023-11-13 RX ADMIN — BUDESONIDE AND FORMOTEROL FUMARATE DIHYDRATE 2 PUFF: 160; 4.5 AEROSOL RESPIRATORY (INHALATION) at 19:57

## 2023-11-13 RX ADMIN — SODIUM CHLORIDE, PRESERVATIVE FREE 10 ML: 5 INJECTION INTRAVENOUS at 08:22

## 2023-11-13 RX ADMIN — SODIUM CHLORIDE, PRESERVATIVE FREE 10 ML: 5 INJECTION INTRAVENOUS at 21:36

## 2023-11-13 RX ADMIN — Medication 1000 MG: at 09:55

## 2023-11-13 RX ADMIN — DESMOPRESSIN ACETATE 40 MG: 0.2 TABLET ORAL at 21:12

## 2023-11-13 RX ADMIN — APIXABAN 5 MG: 5 TABLET, FILM COATED ORAL at 08:22

## 2023-11-13 RX ADMIN — METOPROLOL TARTRATE 25 MG: 25 TABLET ORAL at 08:22

## 2023-11-13 RX ADMIN — PANTOPRAZOLE SODIUM 40 MG: 40 TABLET, DELAYED RELEASE ORAL at 06:35

## 2023-11-13 RX ADMIN — METOPROLOL TARTRATE 25 MG: 25 TABLET ORAL at 21:10

## 2023-11-13 RX ADMIN — BENZOCAINE AND MENTHOL 1 LOZENGE: 15; 3.6 LOZENGE ORAL at 21:14

## 2023-11-13 RX ADMIN — APIXABAN 5 MG: 5 TABLET, FILM COATED ORAL at 21:12

## 2023-11-13 ASSESSMENT — PAIN DESCRIPTION - DESCRIPTORS: DESCRIPTORS: SORE

## 2023-11-13 ASSESSMENT — PAIN SCALES - GENERAL: PAINLEVEL_OUTOF10: 3

## 2023-11-13 ASSESSMENT — PAIN DESCRIPTION - LOCATION: LOCATION: THROAT

## 2023-11-13 NOTE — PLAN OF CARE
Problem: Cardiovascular - Adult  Goal: Absence of cardiac dysrhythmias or at baseline  Outcome: Progressing  Flowsheets (Taken 11/12/2023 2000)  Absence of cardiac dysrhythmias or at baseline:   Monitor cardiac rate and rhythm   Administer antiarrhythmia medication and electrolyte replacement as ordered     Problem: Cardiovascular - Adult  Goal: Maintains optimal cardiac output and hemodynamic stability  Outcome: Progressing     Problem: Skin/Tissue Integrity - Adult  Goal: Skin integrity remains intact  Outcome: Progressing  Flowsheets (Taken 11/12/2023 2000)  Skin Integrity Remains Intact: Monitor for areas of redness and/or skin breakdown     Problem: Hematologic - Adult  Goal: Maintains hematologic stability  Outcome: Progressing  Flowsheets (Taken 11/12/2023 2000)  Maintains hematologic stability: Assess for signs and symptoms of bleeding or hemorrhage     Problem: ABCDS Injury Assessment  Goal: Absence of physical injury  Outcome: Progressing  Flowsheets (Taken 11/12/2023 2000)  Absence of Physical Injury: Implement safety measures based on patient assessment     Problem: Discharge Planning  Goal: Discharge to home or other facility with appropriate resources  Outcome: Progressing  Flowsheets (Taken 11/12/2023 2000)  Discharge to home or other facility with appropriate resources:   Identify barriers to discharge with patient and caregiver   Arrange for needed discharge resources and transportation as appropriate   Identify discharge learning needs (meds, wound care, etc)   Refer to discharge planning if patient needs post-hospital services based on physician order or complex needs related to functional status, cognitive ability or social support system

## 2023-11-13 NOTE — PROGRESS NOTES
[facility]  Internal Medicine Teaching Residency Program  Inpatient Daily Progress Note  ______________________________________________________________________________    Patient: Lily Ortega  YOB: 1945   [id number]    Acct: [de-identified]     Room: [address] [place] [address]  Admit date: 11/11/2023  Today's date: 11/13/23  Number of days in the hospital: 2    SUBJECTIVE   Admitting Diagnosis: A-fib ([address])  CC: SOB  Pt examined at bedside. Chart & results reviewed. Afebrile. VSS on 3L NC. Patient reports pain in LE on palpation, doppler U/S ordered. Received dose of digoxin yesterday due to poor rate control. This morning, rate controlled. ROS:  Constitutional:  negative for chills, fevers, sweats  Respiratory:  negative for cough, dyspnea on exertion, hemoptysis, shortness of breath, wheezing  Cardiovascular:  negative for chest pain, chest pressure/discomfort, lower extremity edema, palpitations  Gastrointestinal:  negative for abdominal pain, constipation, diarrhea, nausea, vomiting  Neurological:  negative for dizziness, headache  BRIEF HISTORY     The patient is a pleasant 78 y.o. female with a past medical history of A-fib s/p 2018 ablation, hyperlipidemia, hypertension, smoking, right-sided breast cancer s/p radiation and right lumpectomy in 2011 presents with a chief complaint of 1-week history of shortness of breath worsened by laying flat. No prior history of CHF. Reports mild improvement in shortness of breath after taking dose of her 's furosemide 80 mg pill. No lower extremity edema present. Associated with nonproductive cough. PERC rule positive. CT PE showed no PE, however found to have bilateral pleural effusion L>R, and an incidental 3 mm left upper lobe nodule. She also reports intermittent episodes of nonradiating, sharp, stabbing left-sided chest pain lasting several minutes that are unprovoked.   Chest pain is not associated consulted   -Plan for cardioversion today     Chest pain             -Intermittent episodes of unprovoked, sharp, nonradiating left-sided chest pain lasting several minutes             -2018 echo shows normal LVSF, EF 55%             -Troponin 14 < 15 < 15              -Cardiomegaly on chest x-ray             -Cardiology consulted             -Echo ordered. UTI             - U/A: Large LES, few bacteria             -Started on Rocephin             -Follow-up urine culture     Hypertension             -Home dose lisinopril 5 mg held for soft pressures     Lung nodule             -Incidental 3 mm left upper lobe lung nodule found on CT PE             -Concern for malignancy given that patient is a current smoker with a 50-pack-year smoking history             -Will need outpatient follow-up imaging to monitor progression of nodule     Smoker             -Nicotine patch ordered        DVT ppx: Eliquis  GI ppx: Protonix     PT/OT/SW consulted  Discharge Planning: CM on board      DVT ppx : eloquis  GI ppx: protonix    PT/OT:   Discharge Planning / SW:     Tu Maria MD  Internal Medicine Resident, PGY-1  [id number] [address];  [place], [place]  11/13/2023, 2:58 PM

## 2023-11-13 NOTE — PLAN OF CARE
Problem: Discharge Planning  Goal: Discharge to home or other facility with appropriate resources  11/13/2023 0836 by Royal Efraín RN  Outcome: Progressing  11/13/2023 0112 by Noemi Sifuentes RN  Outcome: Progressing  Flowsheets (Taken 11/12/2023 2000)  Discharge to home or other facility with appropriate resources:   Identify barriers to discharge with patient and caregiver   Arrange for needed discharge resources and transportation as appropriate   Identify discharge learning needs (meds, wound care, etc)   Refer to discharge planning if patient needs post-hospital services based on physician order or complex needs related to functional status, cognitive ability or social support system     Problem: ABCDS Injury Assessment  Goal: Absence of physical injury  11/13/2023 0836 by Royal Efraín RN  Outcome: Progressing  11/13/2023 0112 by Noemi Sifuentes RN  Outcome: Progressing  Flowsheets (Taken 11/12/2023 2000)  Absence of Physical Injury: Implement safety measures based on patient assessment     Problem: Safety - Adult  Goal: Free from fall injury  11/13/2023 0836 by Royal Efraín RN  Outcome: Progressing  11/13/2023 0112 by Noemi Sifuentes RN  Outcome: Progressing  Flowsheets (Taken 11/12/2023 2000)  Free From Fall Injury: Instruct family/caregiver on patient safety     Problem: Respiratory - Adult  Goal: Achieves optimal ventilation and oxygenation  11/13/2023 0836 by Royal Efraín RN  Outcome: Progressing  11/13/2023 0112 by Noemi Sifuentes RN  Outcome: Progressing  Flowsheets (Taken 11/12/2023 2000)  Achieves optimal ventilation and oxygenation:   Assess for changes in respiratory status   Assess for changes in mentation and behavior   Respiratory therapy support as indicated   Oxygen supplementation based on oxygen saturation or arterial blood gases     Problem: Cardiovascular - Adult  Goal: Maintains optimal cardiac output and hemodynamic stability  11/13/2023 0836 by Gabriel

## 2023-11-13 NOTE — PLAN OF CARE
Problem: Respiratory - Adult  Goal: Achieves optimal ventilation and oxygenation  11/13/2023 0840 by Sherice Messina RCP  Flowsheets (Taken 11/13/2023 0840)  Achieves optimal ventilation and oxygenation: Respiratory therapy support as indicated

## 2023-11-13 NOTE — PROCEDURES
[facility]  Cardioversion Procedure Note         Today's Date: 11/13/2023  Indication: Atrial fibrillation/Atrial flutter    Patient seen and examined. History and Physical reviewed. Labs reviewed. After informed consent was obtained with explanation of the risks and benefits, the patient was prepared using standard tecqniques. All Conscious Sedation was administered via the Cardiologist.     CARDIOVERSION:    After an adequate level of sedation was achieved  200J in biphasic synchronized delivery was administered. no change in rhythm. 360 in biphasic synchronized delivery was administered. With successful change to sinus rhythm. The patient awoke without complications. A post procedure 12 L ECG was ordered and reviewed. Impression:  Successful Consious Sedation - safely  Successful Cardioversion      Complications: There were no complications encountered. The patient will continue with the discharge meds and has been instructed to follow-up with your primary cardiologist for continued long term care and cardiovascular management. There were no complications encountered. Electronically signed on 11/13/23 at 4:11 PM by:    Jennifer Gregorio MD, MD   Fellow, [address] [place] [address]    Attending Physician Statement  I have discussed the case of Maria E Mendoza including pertinent history and exam findings with the student/resident/fellow. I have seen and examined the patient and the key elements of the encounter have been performed by me. I agree with the assessment, plan and orders as documented by the resident With changes made to the note.   I was present during entire procedure and performed all critical elements of the procedure    Electronically signed by Amairani Velasquez MD on 11/15/2023 at 2:56 PM.    [facility]      [phone]

## 2023-11-13 NOTE — PROGRESS NOTES
Occupational Therapy  [facility] [address] [facility]  [facility]ational Therapy Initial Assessment    Name: Wai Barber  : 1945  MRN: [id number]  Date of Service: 2023  Chief Complaint   Patient presents with    Shortness of Breath     Discharge Recommendations:  Patient would benefit from continued therapy after discharge  OT Equipment Recommendations  Equipment Needed: Yes  Mobility Devices: ADL Assistive Devices  ADL Assistive Devices: Grab Bars - toilet;Grab Bars - shower       Patient Diagnosis(es): The encounter diagnosis was Paroxysmal atrial fibrillation ([address]). Past Medical History:  has a past medical history of Aneurysm of abdominal aorta ([address]), Atrial fibrillation ([address]), Cancer ([address]), ETD (eustachian tube dysfunction), Facial asymmetries, Fracture, ankle, Gastric ulcer, History of radiation therapy, Hx of cardiac cath, Hx of gastroenteritis, Hyperlipidemia, Hypertension, IBS (irritable bowel syndrome), Jaw pain, non-TMJ, Mammogram abnormal, Mitral valve prolapse syndrome, and Pregnancy, incidental.  Past Surgical History:  has a past surgical history that includes Colonoscopy (Complete Colonoscopy for Polyp Removal); transesophageal echocardiogram (2018); other surgical history (2018); Appendectomy; Upper gastrointestinal endoscopy; Hysterectomy; Tubal ligation; Tonsillectomy and adenoidectomy; Breast lumpectomy (Right); Upper gastrointestinal endoscopy (N/A, 1/10/2020); and Colonoscopy (N/A, 1/10/2020). Assessment   Performance deficits / Impairments: Decreased functional mobility ; Decreased endurance;Decreased ADL status; Decreased balance;Decreased high-level IADLs  Prognosis: Good  Decision Making: Medium Complexity  REQUIRES OT FOLLOW-UP: Yes  Activity Tolerance  Activity Tolerance: Patient Tolerated treatment well;Patient limited by fatigue        Plan   Occupational Therapy Plan  Times Per Week: 3-5x

## 2023-11-14 ENCOUNTER — APPOINTMENT (OUTPATIENT)
Dept: VASCULAR LAB | Age: 78
DRG: 309 | End: 2023-11-14
Payer: COMMERCIAL

## 2023-11-14 VITALS
TEMPERATURE: 98.1 F | WEIGHT: 190.26 LBS | SYSTOLIC BLOOD PRESSURE: 122 MMHG | RESPIRATION RATE: 20 BRPM | HEART RATE: 74 BPM | BODY MASS INDEX: 32.48 KG/M2 | OXYGEN SATURATION: 96 % | HEIGHT: 64 IN | DIASTOLIC BLOOD PRESSURE: 59 MMHG

## 2023-11-14 LAB
ANION GAP SERPL CALCULATED.3IONS-SCNC: 13 MMOL/L (ref 9–17)
BASOPHILS # BLD: 0.06 K/UL (ref 0–0.2)
BASOPHILS NFR BLD: 1 % (ref 0–2)
BUN SERPL-MCNC: 11 MG/DL (ref 8–23)
CALCIUM SERPL-MCNC: 8.8 MG/DL (ref 8.6–10.4)
CHLORIDE SERPL-SCNC: 100 MMOL/L (ref 98–107)
CO2 SERPL-SCNC: 24 MMOL/L (ref 20–31)
CREAT SERPL-MCNC: 0.8 MG/DL (ref 0.5–0.9)
ECHO BSA: 1.97 M2
EOSINOPHIL # BLD: 0.25 K/UL (ref 0–0.44)
EOSINOPHILS RELATIVE PERCENT: 3 % (ref 1–4)
ERYTHROCYTE [DISTWIDTH] IN BLOOD BY AUTOMATED COUNT: 13.1 % (ref 11.8–14.4)
GFR SERPL CREATININE-BSD FRML MDRD: >60 ML/MIN/1.73M2
GLUCOSE BLD-MCNC: 103 MG/DL (ref 65–105)
GLUCOSE BLD-MCNC: 155 MG/DL (ref 65–105)
GLUCOSE BLD-MCNC: 96 MG/DL (ref 65–105)
GLUCOSE SERPL-MCNC: 91 MG/DL (ref 70–99)
HCT VFR BLD AUTO: 43.1 % (ref 36.3–47.1)
HGB BLD-MCNC: 13.6 G/DL (ref 11.9–15.1)
IMM GRANULOCYTES # BLD AUTO: 0.03 K/UL (ref 0–0.3)
IMM GRANULOCYTES NFR BLD: 0 %
LYMPHOCYTES NFR BLD: 1.34 K/UL (ref 1.1–3.7)
LYMPHOCYTES RELATIVE PERCENT: 17 % (ref 24–43)
MCH RBC QN AUTO: 32.1 PG (ref 25.2–33.5)
MCHC RBC AUTO-ENTMCNC: 31.6 G/DL (ref 28.4–34.8)
MCV RBC AUTO: 101.7 FL (ref 82.6–102.9)
MONOCYTES NFR BLD: 0.84 K/UL (ref 0.1–1.2)
MONOCYTES NFR BLD: 10 % (ref 3–12)
NEUTROPHILS NFR BLD: 69 % (ref 36–65)
NEUTS SEG NFR BLD: 5.56 K/UL (ref 1.5–8.1)
NRBC BLD-RTO: 0 PER 100 WBC
PLATELET # BLD AUTO: 198 K/UL (ref 138–453)
PMV BLD AUTO: 9.5 FL (ref 8.1–13.5)
POTASSIUM SERPL-SCNC: 4.1 MMOL/L (ref 3.7–5.3)
RBC # BLD AUTO: 4.24 M/UL (ref 3.95–5.11)
SODIUM SERPL-SCNC: 137 MMOL/L (ref 135–144)
WBC OTHER # BLD: 8.1 K/UL (ref 3.5–11.3)

## 2023-11-14 PROCEDURE — 97530 THERAPEUTIC ACTIVITIES: CPT

## 2023-11-14 PROCEDURE — 80048 BASIC METABOLIC PNL TOTAL CA: CPT

## 2023-11-14 PROCEDURE — 36415 COLL VENOUS BLD VENIPUNCTURE: CPT

## 2023-11-14 PROCEDURE — 6370000000 HC RX 637 (ALT 250 FOR IP)

## 2023-11-14 PROCEDURE — 99233 SBSQ HOSP IP/OBS HIGH 50: CPT

## 2023-11-14 PROCEDURE — 99232 SBSQ HOSP IP/OBS MODERATE 35: CPT | Performed by: INTERNAL MEDICINE

## 2023-11-14 PROCEDURE — 2700000000 HC OXYGEN THERAPY PER DAY

## 2023-11-14 PROCEDURE — 93971 EXTREMITY STUDY: CPT

## 2023-11-14 PROCEDURE — 93971 EXTREMITY STUDY: CPT | Performed by: SURGERY

## 2023-11-14 PROCEDURE — 94761 N-INVAS EAR/PLS OXIMETRY MLT: CPT

## 2023-11-14 PROCEDURE — 85025 COMPLETE CBC W/AUTO DIFF WBC: CPT

## 2023-11-14 PROCEDURE — 97162 PT EVAL MOD COMPLEX 30 MIN: CPT

## 2023-11-14 PROCEDURE — 2580000003 HC RX 258

## 2023-11-14 PROCEDURE — 94640 AIRWAY INHALATION TREATMENT: CPT

## 2023-11-14 PROCEDURE — 6360000002 HC RX W HCPCS

## 2023-11-14 PROCEDURE — 82947 ASSAY GLUCOSE BLOOD QUANT: CPT

## 2023-11-14 RX ORDER — CEPHALEXIN 500 MG/1
500 CAPSULE ORAL 4 TIMES DAILY
Qty: 16 CAPSULE | Refills: 0 | Status: SHIPPED | OUTPATIENT
Start: 2023-11-14 | End: 2023-11-18

## 2023-11-14 RX ORDER — BUDESONIDE AND FORMOTEROL FUMARATE DIHYDRATE 160; 4.5 UG/1; UG/1
2 AEROSOL RESPIRATORY (INHALATION)
Qty: 10.2 G | Refills: 3 | Status: SHIPPED | OUTPATIENT
Start: 2023-11-14

## 2023-11-14 RX ORDER — FUROSEMIDE 10 MG/ML
20 INJECTION INTRAMUSCULAR; INTRAVENOUS ONCE
Status: COMPLETED | OUTPATIENT
Start: 2023-11-14 | End: 2023-11-14

## 2023-11-14 RX ORDER — FUROSEMIDE 20 MG/1
20 TABLET ORAL DAILY
Qty: 60 TABLET | Refills: 3 | Status: SHIPPED | OUTPATIENT
Start: 2023-11-14

## 2023-11-14 RX ADMIN — SODIUM CHLORIDE, PRESERVATIVE FREE 10 ML: 5 INJECTION INTRAVENOUS at 09:00

## 2023-11-14 RX ADMIN — BENZOCAINE AND MENTHOL 1 LOZENGE: 15; 3.6 LOZENGE ORAL at 06:28

## 2023-11-14 RX ADMIN — BUDESONIDE AND FORMOTEROL FUMARATE DIHYDRATE 2 PUFF: 160; 4.5 AEROSOL RESPIRATORY (INHALATION) at 09:11

## 2023-11-14 RX ADMIN — FUROSEMIDE 20 MG: 10 INJECTION, SOLUTION INTRAMUSCULAR; INTRAVENOUS at 09:00

## 2023-11-14 RX ADMIN — PANTOPRAZOLE SODIUM 40 MG: 40 TABLET, DELAYED RELEASE ORAL at 06:56

## 2023-11-14 RX ADMIN — APIXABAN 5 MG: 5 TABLET, FILM COATED ORAL at 09:00

## 2023-11-14 RX ADMIN — Medication 1000 MG: at 09:00

## 2023-11-14 RX ADMIN — METOPROLOL TARTRATE 25 MG: 25 TABLET ORAL at 09:00

## 2023-11-14 ASSESSMENT — PAIN SCALES - GENERAL: PAINLEVEL_OUTOF10: 3

## 2023-11-14 ASSESSMENT — PAIN DESCRIPTION - LOCATION: LOCATION: THROAT

## 2023-11-14 ASSESSMENT — PAIN DESCRIPTION - DESCRIPTORS: DESCRIPTORS: SORE

## 2023-11-14 NOTE — PROGRESS NOTES
Physical Therapy  Facility/Department: [address] [facility]  Physical Therapy Initial Assessment    Name: Jennifer Mcneill  : 1945  MRN: [id number]  Date of Service: 2023    Discharge Recommendations: Further therapy recommended at discharge. Chief Complaint   Patient presents with    Shortness of Breath     The patient is a pleasant 78 y.o. female with a past medical history of A-fib s/p 2018 ablation, hyperlipidemia, hypertension, smoking, right-sided breast cancer s/p radiation and right lumpectomy in  presents with a chief complaint of 1-week history of shortness of breath worsened by laying flat. No prior history of CHF. Reports mild improvement in shortness of breath after taking dose of her 's furosemide 80 mg pill. No lower extremity edema present. Associated with nonproductive cough. PERC rule positive. CT PE showed no PE, however found to have bilateral pleural effusion L>R, and an incidental 3 mm left upper lobe nodule. PT Equipment Recommendations  Equipment Needed: No      Patient Diagnosis(es): The primary encounter diagnosis was Paroxysmal atrial fibrillation ([address]). A diagnosis of Pain of right calf was also pertinent to this visit. Past Medical History:  has a past medical history of Aneurysm of abdominal aorta ([address]), Atrial fibrillation ([address]), Cancer ([address]), ETD (eustachian tube dysfunction), Facial asymmetries, Fracture, ankle, Gastric ulcer, History of radiation therapy, Hx of cardiac cath, Hx of gastroenteritis, Hyperlipidemia, Hypertension, IBS (irritable bowel syndrome), Jaw pain, non-TMJ, Mammogram abnormal, Mitral valve prolapse syndrome, and Pregnancy, incidental.  Past Surgical History:  has a past surgical history that includes Colonoscopy (Complete Colonoscopy for Polyp Removal); transesophageal echocardiogram (2018); other surgical history (2018); Appendectomy; Upper gastrointestinal endoscopy; Hysterectomy; Tubal ligation;  Tonsillectomy and adenoidectomy; Breast lumpectomy (Right); Upper gastrointestinal endoscopy (N/A, 1/10/2020); and Colonoscopy (N/A, 1/10/2020). Assessment   Body Structures, Functions, Activity Limitations Requiring Skilled Therapeutic Intervention: Decreased functional mobility ; Decreased ADL status; Decreased body mechanics; Decreased high-level IADLs;Decreased balance;Decreased endurance;Decreased coordination  Assessment: Pt ambulates 300 ft with no AD and CGA. pt should be safe to return to prior living situation with intermittent support as needed. pt would benefit from continued therapy to promote endurance, balance, and strengthening. Therapy Prognosis: Good  Decision Making: Medium Complexity  Barriers to Learning: none  Requires PT Follow-Up: Yes  Activity Tolerance  Activity Tolerance: Patient limited by endurance; Patient limited by fatigue     Plan   Physical Therapy Plan  General Plan:  (5x)  Current Treatment Recommendations: Strengthening, Balance training, Functional mobility training, Transfer training, ADL/Self-care training, IADL training, Stair training, Gait training, Endurance training, Neuromuscular re-education, Equipment evaluation, education, & procurement, Therapeutic activities, Home exercise program, Safety education & training, Patient/Caregiver education & training  Safety Devices  Type of Devices: Left in bed, Nurse notified, Gait belt, Call light within reach, All fall risk precautions in place  Restraints  Restraints Initially in Place: No     Restrictions  Restrictions/Precautions  Restrictions/Precautions: General Precautions, Up as Tolerated  Required Braces or Orthoses?: No  Position Activity Restriction  Other position/activity restrictions: up w/ A     Subjective   General  Patient assessed for rehabilitation services?: Yes  Response To Previous Treatment: Not applicable  Family / Caregiver Present: Yes  Follows Commands: Within Functional Limits  Subjective  Subjective: RN and pt agreeable

## 2023-11-14 NOTE — PROGRESS NOTES
[facility]  Internal Medicine Teaching Residency Program  Inpatient Daily Progress Note  ______________________________________________________________________________    Patient: Simba Situ  YOB: 1945   RJT:[id number]    Acct: [de-identified]     Room: [facility]  Admit date: 11/11/2023  Today's date: 11/14/23  Number of days in the hospital: 3    SUBJECTIVE   Admitting Diagnosis: A-fib ([address])  CC: SOB  Pt examined at bedside. Chart & results reviewed. Afebrile. Patient desaturated to 80% on RA, placed on 2L NC. She reports no complaints. Cardioversion yesterday for A.fib with RVR. Patient in NSR on telemetry this morning. ROS:  Constitutional:  negative for chills, fevers, sweats  Respiratory:  negative for cough, dyspnea on exertion, hemoptysis, shortness of breath, wheezing  Cardiovascular:  negative for chest pain, chest pressure/discomfort, lower extremity edema, palpitations  Gastrointestinal:  negative for abdominal pain, constipation, diarrhea, nausea, vomiting  Neurological:  negative for dizziness, headache  BRIEF HISTORY     The patient is a pleasant 78 y.o. female with a past medical history of A-fib s/p 2018 ablation, hyperlipidemia, hypertension, smoking, right-sided breast cancer s/p radiation and right lumpectomy in 2011 presents with a chief complaint of 1-week history of shortness of breath worsened by laying flat. No prior history of CHF. Reports mild improvement in shortness of breath after taking dose of her 's furosemide 80 mg pill. No lower extremity edema present. Associated with nonproductive cough. PERC rule positive. CT PE showed no PE, however found to have bilateral pleural effusion L>R, and an incidental 3 mm left upper lobe nodule. She also reports intermittent episodes of nonradiating, sharp, stabbing left-sided chest pain lasting several minutes that are unprovoked.   Chest pain is not associated with optional. If performed and the nodule is stable at 12 months, no further follow-up is recommended. These guidelines do not apply to immunocompromised patients and patients with cancer. Follow up in patients with significant comorbidities as clinically warranted. For lung cancer screening, adhere to Lung-RADS guidelines. Reference: Radiology. 2017; 284(1):228-92. CTA CHEST W WO CONTRAST    Result Date: 11/11/2023  1. No evidence of thoracic aortic aneurysm, dissection, or intramural hematoma. 2. No evidence of pulmonary embolus. 3. No acute abnormality in the abdomen or pelvis. 4. Small to moderate left pleural effusion and small right pleural effusion. 5. Sigmoid diverticulosis. No evidence of diverticulitis. 6. Incidental 3 mm left upper lobe nodule. Please see below for follow-up recommendations. RECOMMENDATIONS: Pathology: 3 mm left solid pulmonary nodule within the upper lobe. Per Fleischner Society Guidelines, a non-contrast Chest CT at 12 months is optional. If performed and the nodule is stable at 12 months, no further follow-up is recommended. These guidelines do not apply to immunocompromised patients and patients with cancer. Follow up in patients with significant comorbidities as clinically warranted. For lung cancer screening, adhere to Lung-RADS guidelines. Reference: Radiology. 2017; 284(1):228-13. XR CHEST PORTABLE    Result Date: 11/11/2023  Cardiomegaly with interstitial edema and trace left pleural effusion.        ASSESSMENT & PLAN     ASSESSMENT / PLAN:     A-fib with RVR             -S/p ablation in 2018             -On Lopressor 12.5 twice daily and Eliquis 5 twice daily at home             -Denies any subsequent episodes of A-fib s/p ablation in 2018             -TSH 2.72.              -No identifiable triggers such as alcohol use, stimulants, PNA, PE             -On Lopressor 25 BID and Eloquis             - Continue on Cardizem drip             -Cardiology consulted   -Plan for

## 2023-11-14 NOTE — PROGRESS NOTES
Home Oxygen Evaluation    Home Oxygen Evaluation completed. Patient is on Room Air    Resting SpO2 on room air = 89%    SpO2 on room air with exercise = 86%    SpO2 on oxygen as above with exercise = 95%    Nocturnal Oximetry with patient on room air is recommended is SpO2 is between 89% and 95% (requires additional order).     jeanne galvez RCP  2:57 PM

## 2023-11-14 NOTE — DISCHARGE INSTRUCTIONS
If you begin to experience any symptoms such as chest pain shortness of breath nausea vomiting dizziness drowsiness abdominal pain loss of consciousness or any other symptoms you find concerning please come to the ED for follow-up evaluation. If you have been given medication please take them as prescribed. Do not take more medication than recommended at any given time. Please follow-up with your primary care provider within 5 to 7 days for continued care. Follow-up with cardiology  Take antibiotics  as prescribed  Use oxygen when ambulating or exercising  Follow-up with pulmonology for COPD and lung mass    Please feel free return to the hospital if your symptoms worsen or any new concerning symptoms develop. Follow-up with your primary care physician as needed for all other the concerns.

## 2023-11-14 NOTE — PLAN OF CARE
Problem: Discharge Planning  Goal: Discharge to home or other facility with appropriate resources  Outcome: Progressing     Problem: Safety - Adult  Goal: Free from fall injury  Outcome: Progressing  Flowsheets (Taken 11/13/2023 2000)  Free From Fall Injury: Instruct family/caregiver on patient safety     Problem: Cardiovascular - Adult  Goal: Absence of cardiac dysrhythmias or at baseline  Outcome: Progressing     Problem: Cardiovascular - Adult  Goal: Maintains optimal cardiac output and hemodynamic stability  Outcome: Progressing

## 2023-11-14 NOTE — CARE COORDINATION
Discharge [address] Case Management Department  Written by: Karishma Stein RN    Patient Name: Darren Ontiveros  Attending Provider: Steven Bolden MD  Admit Date: 2023 10:43 AM  MRN: [id number]  Account: [de-identified]                     : 1945  Discharge Date: 23      Disposition: home    Met with patient and spouse to discuss transitional planning. Plan is home with spouse. Patient qualified for home Oxygen. Freedom of choice provided. DME orders, face to face, home O2 eval, and facesheet faxed to [facility] [place]. Patient provided with portable O2 tank. Patient and spouse instructed on how to use portable O2 tank and to avoid any type of flame while on oxygen. Patient and spouse instructed to call [facility] as soon as they get home to have stationary oxygen concentrator delivered. Patient's spouse to provide transportation home. Patient and spouse deny further needs.      Karishma Stein RN

## 2023-11-14 NOTE — PROGRESS NOTES
Patient was evaluated today for the diagnosis of COPD. I entered a DME order for home oxygen at 1 lpm because the diagnosis and testing require the patient to have supplemental oxygen. Condition will improve or be benefited by oxygen use. The patient is not able to perform good mobility in a home setting and therefore does require the use of a portable oxygen system. The need for this equipment was discussed with the patient and she understands and is in agreement.

## 2023-11-14 NOTE — PLAN OF CARE
Problem: Discharge Planning  Goal: Discharge to home or other facility with appropriate resources  11/14/2023 1004 by Sheri Pandya RN  Outcome: Progressing  11/14/2023 0107 by Vikas Williamson RN  Outcome: Progressing     Problem: Safety - Adult  Goal: Free from fall injury  11/14/2023 0107 by Vikas Williamson RN  Outcome: Progressing  Flowsheets (Taken 11/13/2023 2000)  Free From Fall Injury: Instruct family/caregiver on patient safety   Patient is resting in chair/ bed comfortably. Call light within reach. Chair/bed locked in lowest position. Will continue to monitor.

## 2023-11-15 LAB — ECHO BSA: 1.97 M2

## 2023-11-15 NOTE — DISCHARGE SUMMARY
[address]     Department of Internal Medicine - Staff Internal Medicine Teaching Service    INPATIENT DISCHARGE SUMMARY      Patient Identification:  Lauren Benson is a 78 y.o. female. :  1945  MRN: [id number]     Acct: [de-identified]   PCP: Arne Mortimer, APRN - CNP  Admit Date:  2023  Discharge date and time: 2023  5:46 PM   Attending Provider: No att. providers found                                     [address] [place] [address], [address] Problem Lists:  Principal Problem:    A-fib ([address])  Active Problems:    UTI (urinary tract infection)    Lung nodule    Pain of right calf  Resolved Problems:    Pain of left calf      HOSPITAL STAY     Brief Inpatient course: Lauren Benson is a 78 y.o. female with a past medical history of A-fib s/p 2018 ablation, hyperlipidemia, hypertension, smoking, right-sided breast cancer s/p radiation and right lumpectomy in  who was admitted for the management of A-fib [facility]), presented to the emergency department with 1-week history of shortness of breath worsened by laying flat and associated with nonproductive cough. No prior history of CHF. Reports mild improvement in shortness of breath after taking dose of her 's furosemide 80 mg pill. No lower extremity edema present. PERC rule positive. CT PE showed no PE, however found to have an incidental 3 mm left upper lobe nodule, likely 2/2 smoking hx. BL expiratory wheezing appreciated on chest auscultation with improvement of SOB s/p supplemental O2, and spiriva. Patient likely has an undiagnosed COPD 2/2 extensive smoking hx. She was discharged on 1L home O2 and spiriva, and instructed to establish care with pulmonology for repeat CT and monitoring of left lung nodule and further treatment/management of COPD. Patient also reported intermittent episodes of nonradiating, sharp, stabbing left-sided chest pain lasting several minutes that are unprovoked.   Chest pain screening    Note that over 30 minutes was spent in preparing discharge papers, discussing discharge with patient, medication review, etc.      Zulema Wray MD, MD  Internal Medicine Resident, PGY-1  [address];  [place], [place]  11/15/2023, 11:26 AM

## 2023-11-15 NOTE — DISCHARGE SUMMARY
11/15/23 @ [address] [place] [address] from [address] called requesting discharge summary for oxygen ordered and delivered yesterday.  Faxed per his request.

## 2023-11-16 NOTE — PROGRESS NOTES
CLINICAL PHARMACY NOTE: MEDS TO BEDS    Total # of Prescriptions Filled: 4   The following medications were delivered to the patient:  Symbicort  Cephalexin  Metoprolol  furosemide    Additional Documentation:

## 2023-11-21 NOTE — PROGRESS NOTES
Physician Progress Note      Stefania Mckee  FALGUNI #:                  [id number]  :                       1945  ADMIT DATE:       2023 10:43 AM  DISCH DATE:        2023 5:46 PM  RESPONDING  PROVIDER #:        Sofia Ha          QUERY TEXT:    Pt admitted with Paroxysmal atrial fibrillation and has CHF documented. If   possible, please document in progress notes and discharge summary further   specificity regarding the type and acuity of CHF:    The medical record reflects the following:  Risk Factors: HTN with CHF, Paroxysmal atrial fibrillation, DM 2  Clinical Indicators: SOB 2/2 CHF due to typical symptoms worsening SOB while   laying flat and  patient reported improvement s/p dose of 's Lasix, bilateral pleural   effusion L>R, -  ECHO: Left? Ventricle: Normal left ventricular systolic   function. EF by 2D Simpsons Biplane is 61%. Left ventricle size is normal.   Normal wall thickness. Normal wall motion.  -  Mitral?Valve: Mildly thickened leaflet, at the anterior leaflet. Mild   regurgitation.  -  Left? Atrium: Left atrium is dilated. Treatment: Lasix IV on 2023 to 2023, p.o. digoxin and metoprolol,    labs, Monitoring, ECHO, EKG    Thank you Maribell Tineo RN BSN  Email [email or web address]. [email or web address]  Cell [phone]  Office Hours M-F 6am - 2:30pm  Options provided:  -- Acute on Chronic Diastolic CHF/HFpEF  -- Acute Diastolic CHF/HFpEF  -- Chronic Diastolic CHF/HFpEF  -- Other - I will add my own diagnosis  -- Disagree - Not applicable / Not valid  -- Disagree - Clinically unable to determine / Unknown  -- Refer to Clinical Documentation Reviewer    PROVIDER RESPONSE TEXT:    This patient is in acute on chronic diastolic CHF/HFpEF.     Query created by: Carrie Zelaya on 2023 8:20 AM      Electronically signed by:  Sofia Ha 2023 8:28 AM

## 2023-11-22 ENCOUNTER — TELEPHONE (OUTPATIENT)
Dept: PRIMARY CARE CLINIC | Age: 78
End: 2023-11-22

## 2023-11-22 DIAGNOSIS — U07.1 COVID-19: Primary | ICD-10-CM

## 2023-11-22 NOTE — TELEPHONE ENCOUNTER
Please advise the patient that she is a candidate for the antiviral medication. The Paxlovid would interact with her Eliquis. The pharmacist is recommending a different antiviral called molnupiravir. This was sent to her local pharmacy.

## 2023-11-22 NOTE — TELEPHONE ENCOUNTER
Please verify with patient when she initially began having symptoms of her viral infection AND confirm when she tested positive

## 2023-11-22 NOTE — TELEPHONE ENCOUNTER
Pharmacy called back to confirm start of symptoms and when she tested positive. Gave information from note stating both started yesterday.

## 2023-11-22 NOTE — TELEPHONE ENCOUNTER
Patient calls in stating she tested positive for COVID yesterday and wants to know if you can send paxlovid to the pharmacy? Please advise.

## 2023-12-04 ENCOUNTER — OFFICE VISIT (OUTPATIENT)
Dept: PRIMARY CARE CLINIC | Age: 78
End: 2023-12-04
Payer: COMMERCIAL

## 2023-12-04 VITALS
HEART RATE: 57 BPM | SYSTOLIC BLOOD PRESSURE: 106 MMHG | DIASTOLIC BLOOD PRESSURE: 67 MMHG | BODY MASS INDEX: 31.03 KG/M2 | WEIGHT: 180.8 LBS | OXYGEN SATURATION: 99 %

## 2023-12-04 DIAGNOSIS — Z09 HOSPITAL DISCHARGE FOLLOW-UP: ICD-10-CM

## 2023-12-04 DIAGNOSIS — K21.9 GASTROESOPHAGEAL REFLUX DISEASE WITHOUT ESOPHAGITIS: ICD-10-CM

## 2023-12-04 DIAGNOSIS — I48.91 ATRIAL FIBRILLATION WITH RVR (HCC): Primary | ICD-10-CM

## 2023-12-04 DIAGNOSIS — R10.9 ABDOMINAL PAIN WITH VOMITING: ICD-10-CM

## 2023-12-04 DIAGNOSIS — I50.30 HEART FAILURE WITH PRESERVED EJECTION FRACTION, UNSPECIFIED HF CHRONICITY (HCC): ICD-10-CM

## 2023-12-04 DIAGNOSIS — U07.1 COVID-19: ICD-10-CM

## 2023-12-04 DIAGNOSIS — R11.10 ABDOMINAL PAIN WITH VOMITING: ICD-10-CM

## 2023-12-04 DIAGNOSIS — R13.10 DYSPHAGIA, UNSPECIFIED TYPE: ICD-10-CM

## 2023-12-04 PROCEDURE — 99214 OFFICE O/P EST MOD 30 MIN: CPT | Performed by: NURSE PRACTITIONER

## 2023-12-04 PROCEDURE — 1124F ACP DISCUSS-NO DSCNMKR DOCD: CPT | Performed by: NURSE PRACTITIONER

## 2023-12-04 PROCEDURE — 3078F DIAST BP <80 MM HG: CPT | Performed by: NURSE PRACTITIONER

## 2023-12-04 PROCEDURE — 1111F DSCHRG MED/CURRENT MED MERGE: CPT | Performed by: NURSE PRACTITIONER

## 2023-12-04 PROCEDURE — 3074F SYST BP LT 130 MM HG: CPT | Performed by: NURSE PRACTITIONER

## 2023-12-04 RX ORDER — OMEPRAZOLE 40 MG/1
40 CAPSULE, DELAYED RELEASE ORAL
Qty: 30 CAPSULE | Refills: 5 | Status: SHIPPED | OUTPATIENT
Start: 2023-12-04

## 2023-12-04 NOTE — PROGRESS NOTES
Inpatient course: Discharge summary reviewed- see chart. Brief Inpatient course: Tony Castañeda is a 78 y.o. female with a past medical history of A-fib s/p 2018 ablation, hyperlipidemia, hypertension, smoking, right-sided breast cancer s/p radiation and right lumpectomy in 2011 who was admitted for the management of A-fib [facility]), presented to the emergency department with 1-week history of shortness of breath worsened by laying flat and associated with nonproductive cough. No prior history of CHF. Reports mild improvement in shortness of breath after taking dose of her 's furosemide 80 mg pill. No lower extremity edema present. PERC rule positive. CT PE showed no PE, however found to have an incidental 3 mm left upper lobe nodule, likely 2/2 smoking hx. BL expiratory wheezing appreciated on chest auscultation with improvement of SOB s/p supplemental O2, and spiriva. Patient likely has an undiagnosed COPD 2/2 extensive smoking hx. She was discharged on 1L home O2 and spiriva, and instructed to establish care with pulmonology for repeat CT and monitoring of left lung nodule and further treatment/management of COPD. Patient also reported intermittent episodes of nonradiating, sharp, stabbing left-sided chest pain lasting several minutes that are unprovoked. Chest pain is not associated with nausea, vomiting, shortness of breath, diaphoresis, or weakness. EKG shows A-fib with RVR, QTc greater than 500. She received diltiazem 20 x2 doses in with minimal response, so was started on IV Cardizem drip. Troponin unremarkable. Cardiology consulted. Patient underwent successful electrical cardioversion on 11/14 with return to NSR. Discharged on lasix 20 PID for diastolic HFpEF seen on echo during this admission. She should follow-up with her PCP in 1 week.     Interval history/Current status: Stable    Patient Active Problem List   Diagnosis    GERD (gastroesophageal reflux disease)

## 2023-12-11 PROBLEM — N39.0 UTI (URINARY TRACT INFECTION): Status: RESOLVED | Noted: 2023-11-11 | Resolved: 2023-12-11

## 2024-01-17 DIAGNOSIS — I10 ESSENTIAL HYPERTENSION: ICD-10-CM

## 2024-01-17 RX ORDER — SIMVASTATIN 40 MG
TABLET ORAL
Qty: 90 TABLET | Refills: 0 | Status: SHIPPED | OUTPATIENT
Start: 2024-01-17

## 2024-01-25 ENCOUNTER — TELEPHONE (OUTPATIENT)
Dept: PRIMARY CARE CLINIC | Age: 79
End: 2024-01-25

## 2024-01-25 NOTE — TELEPHONE ENCOUNTER
Patient states she went to get a haircut, patient states she has had a seizure and she was out of it for about 2-3 minutes. Patient states she has been getting them more frequently.

## 2024-01-29 ENCOUNTER — OFFICE VISIT (OUTPATIENT)
Dept: PRIMARY CARE CLINIC | Age: 79
End: 2024-01-29
Payer: COMMERCIAL

## 2024-01-29 VITALS
HEART RATE: 99 BPM | BODY MASS INDEX: 30.73 KG/M2 | DIASTOLIC BLOOD PRESSURE: 69 MMHG | WEIGHT: 179 LBS | OXYGEN SATURATION: 97 % | SYSTOLIC BLOOD PRESSURE: 92 MMHG

## 2024-01-29 DIAGNOSIS — R56.9 OBSERVED SEIZURE-LIKE ACTIVITY (HCC): Primary | ICD-10-CM

## 2024-01-29 PROCEDURE — 1124F ACP DISCUSS-NO DSCNMKR DOCD: CPT | Performed by: NURSE PRACTITIONER

## 2024-01-29 PROCEDURE — 99213 OFFICE O/P EST LOW 20 MIN: CPT | Performed by: NURSE PRACTITIONER

## 2024-01-29 PROCEDURE — 3078F DIAST BP <80 MM HG: CPT | Performed by: NURSE PRACTITIONER

## 2024-01-29 PROCEDURE — 3074F SYST BP LT 130 MM HG: CPT | Performed by: NURSE PRACTITIONER

## 2024-01-29 ASSESSMENT — PATIENT HEALTH QUESTIONNAIRE - PHQ9
SUM OF ALL RESPONSES TO PHQ QUESTIONS 1-9: 0
SUM OF ALL RESPONSES TO PHQ9 QUESTIONS 1 & 2: 0
1. LITTLE INTEREST OR PLEASURE IN DOING THINGS: 0
2. FEELING DOWN, DEPRESSED OR HOPELESS: 0

## 2024-01-29 ASSESSMENT — ENCOUNTER SYMPTOMS: VOMITING: 1

## 2024-01-29 NOTE — PROGRESS NOTES
[facility]  [facility]  [address]  [facility] [address]  Dept: [phone]  Dept Fax: [phone]    Chantell Yañez (:  1945) is a 78 y.o. female,Established patient, here for evaluation of the following chief complaint(s):  Seizures (Patient is here today for frequent seizures )         ASSESSMENT/PLAN:  1. Observed seizure-like activity (HCC)  -     [facility], Neurology, [facility]    Head CT 2022 without acute process in relation to intermittent pains.  During October visit the patient denied any issues with headache.  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  There is no LOC however she did not respond to outside stimuli.  She does recall being fatigued after the fact.  Referral placed to neurology at this time for further evaluation for possible seizure activity, patient encouraged not to drive until she has neuro evaluation.  Reviewed current medications, no outside cause of seizure activity identified    No follow-ups on file.         Subjective   SUBJECTIVE/OBJECTIVE:  Chantell is concerned about seizures.  She has a hx of intermittent sharp pains to her head, more the left side.  In the last year the head pains had subsided.    At her hair appt it occurred and the  said she was unresponsive for 2 minutes. Had eyes open during. Occurred on Thursday.   has not been home to witness.    Episodes now occurring once a day. Felt one coming on when out with her family, however did not have any loss of time.     Seizures  This is a new problem. The problem has been gradually worsening. Associated symptoms include vomiting. Pertinent negatives include no urinary symptoms.       Review of Systems   Gastrointestinal:  Positive for vomiting.   Neurological:  Positive for seizures.          Objective

## 2024-02-27 ENCOUNTER — OFFICE VISIT (OUTPATIENT)
Dept: GASTROENTEROLOGY | Age: 79
End: 2024-02-27
Payer: COMMERCIAL

## 2024-02-27 VITALS
BODY MASS INDEX: 30.56 KG/M2 | HEIGHT: 64 IN | SYSTOLIC BLOOD PRESSURE: 96 MMHG | WEIGHT: 179 LBS | HEART RATE: 115 BPM | OXYGEN SATURATION: 95 % | TEMPERATURE: 97 F | DIASTOLIC BLOOD PRESSURE: 67 MMHG

## 2024-02-27 DIAGNOSIS — R10.9 ABDOMINAL PAIN, UNSPECIFIED ABDOMINAL LOCATION: Primary | ICD-10-CM

## 2024-02-27 DIAGNOSIS — K21.9 GASTROESOPHAGEAL REFLUX DISEASE, UNSPECIFIED WHETHER ESOPHAGITIS PRESENT: ICD-10-CM

## 2024-02-27 DIAGNOSIS — D12.6 COLON ADENOMA: ICD-10-CM

## 2024-02-27 DIAGNOSIS — R14.2 BELCHING: ICD-10-CM

## 2024-02-27 PROCEDURE — 3078F DIAST BP <80 MM HG: CPT | Performed by: INTERNAL MEDICINE

## 2024-02-27 PROCEDURE — 3074F SYST BP LT 130 MM HG: CPT | Performed by: INTERNAL MEDICINE

## 2024-02-27 PROCEDURE — 99204 OFFICE O/P NEW MOD 45 MIN: CPT | Performed by: INTERNAL MEDICINE

## 2024-02-27 PROCEDURE — 1124F ACP DISCUSS-NO DSCNMKR DOCD: CPT | Performed by: INTERNAL MEDICINE

## 2024-02-27 ASSESSMENT — ENCOUNTER SYMPTOMS
ABDOMINAL PAIN: 1
NAUSEA: 1
COUGH: 1
TROUBLE SWALLOWING: 1
WHEEZING: 1
VOICE CHANGE: 1
CONSTIPATION: 0
BACK PAIN: 1
DIARRHEA: 0
VOMITING: 1
ANAL BLEEDING: 0
COLOR CHANGE: 0
SHORTNESS OF BREATH: 1

## 2024-02-27 NOTE — PROGRESS NOTES
INFECTION.     2.  GASTROESOPHAGEAL JUNCTION, BIOPSIES:   - BENIGN GASTRIC MUCOSA WITH MILD CHRONIC INFLAMMATION.   - NEGATIVE FOR INTESTINAL METAPLASIA.     3.  ASCENDING COLON, BIOPSIES:   - ADENOMATOUS POLYP (TUBULAR ADENOMA).     4.  COLON, HEPATIC FLEXURE, BIOPSY:   - ADENOMATOUS POLYP (TUBULAR ADENOMA).     5.  TRANSVERSE COLON, BIOPSIES:   - ADENOMATOUS POLYP (TUBULAR ADENOMA).     Previous GI workup   CT abdomen and pelvis 11/11/2023:  IMPRESSION:  1. No evidence of thoracic aortic aneurysm, dissection, or intramural  hematoma.  2. No evidence of pulmonary embolus.  3. No acute abnormality in the abdomen or pelvis.  4. Small to moderate left pleural effusion and small right pleural effusion.  5. Sigmoid diverticulosis.  No evidence of diverticulitis.  6. Incidental 3 mm left upper lobe nodule.  Please see below for follow-up  recommendations.    Past Medical,Family, and Social History reviewed and does not contribute to the patient presentingcondition.    Patient's PMH/PSH,SH,PSYCH Hx, MEDs, ALLERGIES, and ROS were all reviewed and updated in the appropriate sections.    PAST MEDICAL HISTORY:  Past Medical History:   Diagnosis Date    Aneurysm of abdominal aorta (HCC)     Atrial fibrillation (HCC)     Cancer (HCC)     breast    ETD (eustachian tube dysfunction)     Facial asymmetries     Fracture, ankle     Gastric ulcer     History of radiation therapy     right breast cancer    Hx of cardiac cath     Hx of gastroenteritis hx of noninfective gastroenteritis and colitis    Hyperlipidemia     Hypertension     IBS (irritable bowel syndrome)     Jaw pain, non-TMJ     Mammogram abnormal     Mitral valve prolapse syndrome     Pregnancy, incidental        Past Surgical History:   Procedure Laterality Date    APPENDECTOMY      BREAST LUMPECTOMY Right     COLONOSCOPY  Complete Colonoscopy for Polyp Removal    x2    COLONOSCOPY N/A 1/10/2020    COLONOSCOPY WITH BIOPSY performed by Stefani Multani MD at [facility]

## 2024-03-20 ENCOUNTER — OFFICE VISIT (OUTPATIENT)
Dept: NEUROLOGY | Age: 79
End: 2024-03-20

## 2024-03-20 VITALS
HEART RATE: 103 BPM | SYSTOLIC BLOOD PRESSURE: 139 MMHG | HEIGHT: 64 IN | DIASTOLIC BLOOD PRESSURE: 79 MMHG | WEIGHT: 177 LBS | BODY MASS INDEX: 30.22 KG/M2

## 2024-03-20 DIAGNOSIS — R56.9 PARTIAL SEIZURE WITH IMPAIRED CONSCIOUSNESS (HCC): Primary | ICD-10-CM

## 2024-03-20 DIAGNOSIS — R41.89 PARTIAL SEIZURE WITH IMPAIRED CONSCIOUSNESS (HCC): Primary | ICD-10-CM

## 2024-03-20 DIAGNOSIS — R25.1 TREMOR, UNSPECIFIED: ICD-10-CM

## 2024-03-20 RX ORDER — LEVETIRACETAM 500 MG/1
500 TABLET ORAL 2 TIMES DAILY
Qty: 60 TABLET | Refills: 5 | Status: SHIPPED | OUTPATIENT
Start: 2024-03-20

## 2024-03-20 ASSESSMENT — ENCOUNTER SYMPTOMS: TROUBLE SWALLOWING: 0

## 2024-03-20 NOTE — PROGRESS NOTES
Negative for congestion and trouble swallowing.    Genitourinary:  Negative for difficulty urinating.   Neurological:  Negative for dizziness and facial asymmetry.   Psychiatric/Behavioral:  Negative for agitation, behavioral problems and confusion.         VITALS  LMP  (LMP Unknown)      NEUROLOGICAL EXAMINATION:     GENERAL  Appears comfortable and in no distress   HEENT  NC/ AT   HEART  S1 and S2 heard; palpation of pulses: radial pulse    NECK  Supple and no bruits heard   MENTAL STATUS:  Alert, oriented, intact memory, no confusion, normal speech, normal language, no hallucination or delusion   CRANIAL NERVES: II     -      Visual fields intact to confrontation  III,IV,VI -  PERR, EOMs full, no ptosis  V     -     Normal facial sensation   VII    -     Normal facial symmetry  VIII   -     Intact hearing   IX,X -     Symmetrical palate  XI    -     Symmetrical shoulder shrug  XII   -     Midline tongue, no atrophy    MOTOR FUNCTION: RUE: Significant for good strength of grade 5/5 in proximal and distal muscle groups   LUE: Significant for good strength of grade 5/5 in proximal and distal muscle groups   RLE: Significant for good strength of grade 5/5 in proximal and distal muscle groups   LLE: Significant for good strength of grade 5/5 in proximal and distal muscle groups      Normal bulk, normal tone and no involuntary movements, no tremor   SENSORY FUNCTION:  Normal touch, normal pin, normal vibration, normal proprioception   CEREBELLAR FUNCTION:  Intact fine motor control over upper limbs and lower limbs   REFLEX FUNCTION:  Symmetric in upper and lower extremities, no Babinski sign   STATION and GAIT  Normal gait and tandem station, normal tip toes and heel walking     Imaging and other relevant testing:     Most recent CT head obtained in 2022: unremarkable     ASSESSMENT and PLAN:      Case of a 78 y.o F with PMH of Alejandro who presented with recurrent episodes of unresponsiveness that started several months

## 2024-04-01 ENCOUNTER — HOSPITAL ENCOUNTER (OUTPATIENT)
Age: 79
Discharge: HOME | End: 2024-04-01
Payer: COMMERCIAL

## 2024-04-01 ENCOUNTER — HOSPITAL ENCOUNTER (OUTPATIENT)
Dept: NEUROLOGY | Age: 79
Discharge: HOME | End: 2024-04-01
Payer: COMMERCIAL

## 2024-04-01 DIAGNOSIS — R41.89 PARTIAL SEIZURE WITH IMPAIRED CONSCIOUSNESS (HCC): ICD-10-CM

## 2024-04-01 DIAGNOSIS — R56.9 PARTIAL SEIZURE WITH IMPAIRED CONSCIOUSNESS (HCC): ICD-10-CM

## 2024-04-01 DIAGNOSIS — R25.1 TREMOR, UNSPECIFIED: ICD-10-CM

## 2024-04-01 LAB
ALBUMIN SERPL-MCNC: 4.2 G/DL (ref 3.5–5.2)
ALBUMIN/GLOB SERPL: 2 {RATIO} (ref 1–2.5)
ALP SERPL-CCNC: 67 U/L (ref 35–104)
ALT SERPL-CCNC: 12 U/L (ref 10–35)
ANION GAP SERPL CALCULATED.3IONS-SCNC: 10 MMOL/L (ref 9–16)
AST SERPL-CCNC: 22 U/L (ref 10–35)
BASOPHILS # BLD: 0.08 K/UL (ref 0–0.2)
BASOPHILS NFR BLD: 1 % (ref 0–2)
BILIRUB SERPL-MCNC: 0.4 MG/DL (ref 0–1.2)
BUN SERPL-MCNC: 20 MG/DL (ref 8–23)
CALCIUM SERPL-MCNC: 9.2 MG/DL (ref 8.6–10.4)
CHLORIDE SERPL-SCNC: 98 MMOL/L (ref 98–107)
CO2 SERPL-SCNC: 27 MMOL/L (ref 20–31)
CREAT SERPL-MCNC: 1 MG/DL (ref 0.5–0.9)
EOSINOPHIL # BLD: 0.4 K/UL (ref 0–0.44)
EOSINOPHILS RELATIVE PERCENT: 4 % (ref 1–4)
ERYTHROCYTE [DISTWIDTH] IN BLOOD BY AUTOMATED COUNT: 13 % (ref 11.8–14.4)
GFR SERPL CREATININE-BSD FRML MDRD: 61 ML/MIN/1.73M2
GLUCOSE SERPL-MCNC: 96 MG/DL (ref 74–99)
HCT VFR BLD AUTO: 38.2 % (ref 36.3–47.1)
HGB BLD-MCNC: 12.6 G/DL (ref 11.9–15.1)
IMM GRANULOCYTES # BLD AUTO: 0.04 K/UL (ref 0–0.3)
IMM GRANULOCYTES NFR BLD: 0 %
LYMPHOCYTES NFR BLD: 1.84 K/UL (ref 1.1–3.7)
LYMPHOCYTES RELATIVE PERCENT: 20 % (ref 24–43)
MAGNESIUM SERPL-MCNC: 1.5 MG/DL (ref 1.6–2.4)
MCH RBC QN AUTO: 30.8 PG (ref 25.2–33.5)
MCHC RBC AUTO-ENTMCNC: 33 G/DL (ref 28.4–34.8)
MCV RBC AUTO: 93.4 FL (ref 82.6–102.9)
MONOCYTES NFR BLD: 0.77 K/UL (ref 0.1–1.2)
MONOCYTES NFR BLD: 9 % (ref 3–12)
NEUTROPHILS NFR BLD: 66 % (ref 36–65)
NEUTS SEG NFR BLD: 5.88 K/UL (ref 1.5–8.1)
NRBC BLD-RTO: 0 PER 100 WBC
PLATELET # BLD AUTO: 232 K/UL (ref 138–453)
PMV BLD AUTO: 9.8 FL (ref 8.1–13.5)
POTASSIUM SERPL-SCNC: 4.2 MMOL/L (ref 3.7–5.3)
PROT SERPL-MCNC: 6.9 G/DL (ref 6.6–8.7)
RBC # BLD AUTO: 4.09 M/UL (ref 3.95–5.11)
SODIUM SERPL-SCNC: 135 MMOL/L (ref 136–145)
TSH SERPL DL<=0.05 MIU/L-ACNC: 3.58 UIU/ML (ref 0.27–4.2)
WBC OTHER # BLD: 9 K/UL (ref 3.5–11.3)

## 2024-04-01 PROCEDURE — 95816 EEG AWAKE AND DROWSY: CPT

## 2024-04-01 PROCEDURE — 80053 COMPREHEN METABOLIC PANEL: CPT

## 2024-04-01 PROCEDURE — 85025 COMPLETE CBC W/AUTO DIFF WBC: CPT

## 2024-04-01 PROCEDURE — 84443 ASSAY THYROID STIM HORMONE: CPT

## 2024-04-01 PROCEDURE — 83735 ASSAY OF MAGNESIUM: CPT

## 2024-04-01 PROCEDURE — 95816 EEG AWAKE AND DROWSY: CPT | Performed by: PSYCHIATRY & NEUROLOGY

## 2024-04-01 PROCEDURE — 36415 COLL VENOUS BLD VENIPUNCTURE: CPT

## 2024-04-02 NOTE — PROCEDURES
EEG REPORT       Patient: Chantell Yañez Age: 78 y.o.  MRN: [id number]      Referring Provider: Nisha Covarrubias APRN - CNP  Attending Physician:  No att. providers found      History: This is a routine scalp EEG recorded with time-locked video monitoring.  Patient is being evaluated for seizures.     This EEG was performed to evaluate for focal and epileptiform abnormalities.       Chantell Yañez   Current Outpatient Medications   Medication Sig Dispense Refill    lisinopril (PRINIVIL;ZESTRIL) 5 MG tablet TAKE 1 TABLET BY MOUTH EVERY DAY 90 tablet 3    levETIRAcetam (KEPPRA) 500 MG tablet Take 1 tablet by mouth 2 times daily 60 tablet 5    simvastatin (ZOCOR) 40 MG tablet TAKE 1 TABLET BY MOUTH AT BEDTIME 90 tablet 0    ELIQUIS 5 MG TABS tablet Take 1 tablet by mouth 2 times daily 180 tablet 3    furosemide (LASIX) 20 MG tablet Take 1 tablet by mouth daily 60 tablet 3    metoprolol tartrate (LOPRESSOR) 25 MG tablet Take 1 tablet by mouth 2 times daily 60 tablet 3    omeprazole (PRILOSEC) 40 MG delayed release capsule Take 1 capsule by mouth every morning (before breakfast) 30 capsule 5    budesonide-formoterol (SYMBICORT) 160-4.5 MCG/ACT AERO Inhale 2 puffs into the lungs in the morning and 2 puffs in the evening. 10.2 g 3    Multiple Vitamin (MULTI-VITAMIN DAILY) TABS Take by mouth      BIOTIN PO Take 1 tablet by mouth daily      Ascorbic Acid (VITAMIN C PO) Take 1 tablet by mouth daily       No current facility-administered medications for this encounter.        Technical Description: This is a 22 channel EEG and 1 channel of EKG performed with time-locked video. Electrodes were placed utilizing the International 10-20 system of placement.     The patient was not sleep deprived. This recording was obtained during wakefulness. EEG done on 4/1/2024     EEG Description: The dominant background activity during maximal recorded wakefulness consisted of bioccipitally dominant 8-9 Hz Hz, 25-35 uV symmetric, regular

## 2024-04-05 ENCOUNTER — HOSPITAL ENCOUNTER (OUTPATIENT)
Dept: MRI IMAGING | Age: 79
End: 2024-04-05
Payer: COMMERCIAL

## 2024-04-05 DIAGNOSIS — R41.89 PARTIAL SEIZURE WITH IMPAIRED CONSCIOUSNESS (HCC): ICD-10-CM

## 2024-04-05 DIAGNOSIS — R56.9 PARTIAL SEIZURE WITH IMPAIRED CONSCIOUSNESS (HCC): ICD-10-CM

## 2024-04-05 PROCEDURE — 70553 MRI BRAIN STEM W/O & W/DYE: CPT

## 2024-04-05 PROCEDURE — 2580000003 HC RX 258

## 2024-04-05 PROCEDURE — 6360000004 HC RX CONTRAST MEDICATION

## 2024-04-05 PROCEDURE — A9579 GAD-BASE MR CONTRAST NOS,1ML: HCPCS

## 2024-04-05 RX ORDER — SODIUM CHLORIDE 0.9 % (FLUSH) 0.9 %
10 SYRINGE (ML) INJECTION PRN
Status: ACTIVE | OUTPATIENT
Start: 2024-04-05

## 2024-04-05 RX ADMIN — GADOTERIDOL 16 ML: 279.3 INJECTION, SOLUTION INTRAVENOUS at 16:14

## 2024-04-05 RX ADMIN — SODIUM CHLORIDE, PRESERVATIVE FREE 10 ML: 5 INJECTION INTRAVENOUS at 16:17

## 2024-04-20 DIAGNOSIS — I10 ESSENTIAL HYPERTENSION: ICD-10-CM

## 2024-04-22 RX ORDER — SIMVASTATIN 40 MG
TABLET ORAL
Qty: 90 TABLET | Refills: 0 | Status: SHIPPED | OUTPATIENT
Start: 2024-04-22

## 2024-05-21 ENCOUNTER — TELEPHONE (OUTPATIENT)
Dept: NEUROLOGY | Age: 79
End: 2024-05-21

## 2024-05-21 NOTE — TELEPHONE ENCOUNTER
Brent Gan,    The patient was curious about her lab work. Would you like for me to give her a call after you view her results?    Please Advise

## 2024-05-23 ENCOUNTER — TELEPHONE (OUTPATIENT)
Dept: PRIMARY CARE CLINIC | Age: 79
End: 2024-05-23

## 2024-05-31 NOTE — TELEPHONE ENCOUNTER
Hello. If you can, please, inform the patient that her EEG is normal and MRI did not reveal any abnormalities that could cause secondary seizure disorder. It does not rule out the diagnosis and she should continue to take her medications. We will have further discussion during her upcoming appointment in July. Thank you!

## 2024-06-27 ENCOUNTER — TELEMEDICINE (OUTPATIENT)
Dept: PRIMARY CARE CLINIC | Age: 79
End: 2024-06-27
Payer: COMMERCIAL

## 2024-06-27 DIAGNOSIS — B96.89 BACTERIAL SINUSITIS: Primary | ICD-10-CM

## 2024-06-27 DIAGNOSIS — J32.9 BACTERIAL SINUSITIS: Primary | ICD-10-CM

## 2024-06-27 PROCEDURE — 99213 OFFICE O/P EST LOW 20 MIN: CPT | Performed by: NURSE PRACTITIONER

## 2024-06-27 PROCEDURE — 1124F ACP DISCUSS-NO DSCNMKR DOCD: CPT | Performed by: NURSE PRACTITIONER

## 2024-06-27 RX ORDER — BENZONATATE 100 MG/1
100 CAPSULE ORAL 3 TIMES DAILY PRN
Qty: 30 CAPSULE | Refills: 0 | Status: SHIPPED | OUTPATIENT
Start: 2024-06-27 | End: 2024-07-07

## 2024-06-27 RX ORDER — DOXYCYCLINE HYCLATE 100 MG
100 TABLET ORAL 2 TIMES DAILY
Qty: 14 TABLET | Refills: 0 | Status: SHIPPED | OUTPATIENT
Start: 2024-06-27 | End: 2024-07-04

## 2024-06-27 ASSESSMENT — ENCOUNTER SYMPTOMS
COUGH: 1
WHEEZING: 0
TROUBLE SWALLOWING: 0
SORE THROAT: 1
SHORTNESS OF BREATH: 1
SINUS COMPLAINT: 1
RHINORRHEA: 1

## 2024-06-27 NOTE — PROGRESS NOTES
Chantell Yañez is a 79 y.o. female evaluated virtual visit via [employer] on 6/27/2024.        Chantell Yañez was evaluated through a synchronous (real-time) audio encounter. Patient identification was verified at the start of the visit. She (or guardian if applicable) is aware that this is a billable service, which includes applicable co-pays. This visit was conducted with the patient's (and/or legal guardian's) verbal consent. She has not had a related appointment within my department in the past 7 days or scheduled within the next 24 hours.   The patient was located at Home: [address] [address].  The provider was located at [facility] [address] [place] [address].    Note: not billable if this call serves to triage the patient into an appointment for the relevant concern  Yes, I confirm.   Chantell Yañez is a 79 y.o. female evaluated via telephone on 6/27/2024 for Sinus Problem  .        JUDAH Gomez CNP       Consent:  She and/or health care decision maker is aware that that she may receive a bill for this telephone service, depending on her insurance coverage, and has provided verbal consent to proceed: Yes      Documentation:  I communicated with the patient and/or health care decision maker about sinus infection.   Details of this discussion including any medical advice provided below      I affirm this is a Patient Initiated Episode with an Established Patient who has not had a related appointment within my department in the past 7 days or scheduled within the next 24 hours.    Total Time: minutes: 11-20 minutes    Note: not billable if this call serves to triage the patient into an appointment for the relevant concern      JUDAH Gomez CNP                  6/27/2024    TELEHEALTH EVALUATION -- Audio/Visual (During COVID-19 public health emergency)    Patient and physician are located in their individual

## 2024-07-03 ENCOUNTER — OFFICE VISIT (OUTPATIENT)
Dept: NEUROLOGY | Age: 79
End: 2024-07-03
Payer: COMMERCIAL

## 2024-07-03 VITALS
DIASTOLIC BLOOD PRESSURE: 73 MMHG | BODY MASS INDEX: 29.69 KG/M2 | HEART RATE: 74 BPM | WEIGHT: 173.9 LBS | SYSTOLIC BLOOD PRESSURE: 105 MMHG | HEIGHT: 64 IN

## 2024-07-03 DIAGNOSIS — I48.91 ATRIAL FIBRILLATION, UNSPECIFIED TYPE (HCC): ICD-10-CM

## 2024-07-03 DIAGNOSIS — R56.9 PARTIAL SEIZURE WITH IMPAIRED CONSCIOUSNESS (HCC): ICD-10-CM

## 2024-07-03 DIAGNOSIS — R41.89 PARTIAL SEIZURE WITH IMPAIRED CONSCIOUSNESS (HCC): ICD-10-CM

## 2024-07-03 DIAGNOSIS — G40.919 BREAKTHROUGH SEIZURE (HCC): Primary | ICD-10-CM

## 2024-07-03 PROCEDURE — 99214 OFFICE O/P EST MOD 30 MIN: CPT

## 2024-07-03 PROCEDURE — 3078F DIAST BP <80 MM HG: CPT

## 2024-07-03 PROCEDURE — 1124F ACP DISCUSS-NO DSCNMKR DOCD: CPT

## 2024-07-03 PROCEDURE — 3074F SYST BP LT 130 MM HG: CPT

## 2024-07-03 RX ORDER — LEVETIRACETAM 500 MG/1
750 TABLET ORAL 2 TIMES DAILY
Qty: 60 TABLET | Refills: 5 | Status: SHIPPED | OUTPATIENT
Start: 2024-07-03

## 2024-07-03 RX ORDER — LEVETIRACETAM 500 MG/1
500 TABLET ORAL 2 TIMES DAILY
Qty: 60 TABLET | Refills: 5 | Status: SHIPPED | OUTPATIENT
Start: 2024-07-03 | End: 2024-07-03

## 2024-07-03 ASSESSMENT — ENCOUNTER SYMPTOMS
TROUBLE SWALLOWING: 0
ABDOMINAL DISTENTION: 0

## 2024-07-03 NOTE — PROGRESS NOTES
Attending Physician Statement  I have discussed the case of Chantell Yañez including pertinent history and exam findings with the resident/ CNP.  I reviewed medications, clinical labs, x-rays and other diagnostic tests with the resident/ CNP. I have seen and examined the patient and the key elements of the encounter have been performed by me. I agree with the assessment, plan and orders as documented by the resident.         Impression and Plan: Ms. Chantell Yañez is a 79 y.o. female with   Seizure disorder: Inadequately controlled; recent breakthrough seizure on 7/1/2024; semiology suggestive of nonconvulsive seizure; will increase the dose of Keppra from 500 mg bid to 750 mg bid.  Driving restrictions and activity restrictions discussed.  She very well understood that she needs to be seizure-free for 6 months before she is able to drive.  Seizure disorder workup: Contrasted brain MRI on 4/8/2024 is unremarkable. Denied hx of head injury and also denied family hx of seizure disorder.   Comorbid atrial fibrillation s/p ablation and presently on Xarelto  History of breast cancer s/p mastectomy  Follow up visit in 4-6 months.      This note was partially created using voice recognition software and is inherently subject to errors including those of syntax and \"sound alike\" substitutions which may escape proofreading.  In such instances, original meaning may be extrapolated by contextual derivation.  Oumar Mijares MD 7/3/2024 3:31 PM

## 2024-07-03 NOTE — PROGRESS NOTES
[facility]  [facility] [address]  Dept: [phone]  Dept Fax: [phone]    NEUROLOGY FOLLOW UP NOTE       PATIENT NAME: Chantell Yañez  PATIENT MRN: [id number]  PRIMARY CARE PHYSICIAN: Nisha Covarrubias, JDUAH - CNP    HPI:      Chantell Yañez is a 79 y.o. female with PMH of A.fib (s/p ablation, on Eliquis), breast cancer (s/p resection) who was referred to neurology by the PCP for the evaluation of recurrent seizure-like episodes      Patient reports that she has her first event on 25th of January that was witnessed by her . Patient suddenly became unresponsive for several minutes. She did not loose her consciousness and did not collapsed. Patient denies having any prodromes and had not recollection of those events. She is not about the frequency and believes that most episodes are unnoticed by her. She started       She denies having history of seizures before or family history of epilepsy     On neurological assessment she is alert and oriented x4, cranial nerves are intact, muscle strength 5/5 in all extremities, negative cerebellar signs       Interval history 7/3/2024: EEG was obtained and did not reveal any abnormalities. MRI james showed mild diffuse cerebral volume loss but otherwise unremarkable. Patient stated that since she was started on Keppra in March her seizure frequency has reduced significantly, she was seizure for 2 months but had breakthrough event 2 days ago that happened in setting of UTI.     PREVIOUS WORKUP:     Lab Results   Component Value Date    WBC 9.0 04/01/2024    HGB 12.6 04/01/2024    HCT 38.2 04/01/2024    MCV 93.4 04/01/2024     04/01/2024       Past Medical History:   Diagnosis Date    Aneurysm of abdominal aorta (HCC)     Atrial fibrillation (HCC)     Cancer (HCC)     breast    ETD (eustachian tube dysfunction)     Facial asymmetries     Fracture, ankle     Gastric ulcer

## 2024-07-08 DIAGNOSIS — K21.9 GASTROESOPHAGEAL REFLUX DISEASE WITHOUT ESOPHAGITIS: ICD-10-CM

## 2024-07-09 RX ORDER — OMEPRAZOLE 40 MG/1
CAPSULE, DELAYED RELEASE ORAL
Qty: 30 CAPSULE | Refills: 0 | Status: SHIPPED | OUTPATIENT
Start: 2024-07-09

## 2024-07-29 DIAGNOSIS — I10 ESSENTIAL HYPERTENSION: ICD-10-CM

## 2024-07-29 RX ORDER — SIMVASTATIN 40 MG
TABLET ORAL
Qty: 90 TABLET | Refills: 0 | Status: SHIPPED | OUTPATIENT
Start: 2024-07-29

## 2024-08-03 DIAGNOSIS — K21.9 GASTROESOPHAGEAL REFLUX DISEASE WITHOUT ESOPHAGITIS: ICD-10-CM

## 2024-08-05 RX ORDER — OMEPRAZOLE 40 MG/1
CAPSULE, DELAYED RELEASE ORAL
Qty: 30 CAPSULE | Refills: 2 | Status: SHIPPED | OUTPATIENT
Start: 2024-08-05

## 2024-08-26 RX ORDER — LEVETIRACETAM 500 MG/1
750 TABLET ORAL 2 TIMES DAILY
Qty: 60 TABLET | Refills: 5 | Status: SHIPPED | OUTPATIENT
Start: 2024-08-26

## 2024-10-22 DIAGNOSIS — I10 ESSENTIAL HYPERTENSION: ICD-10-CM

## 2024-10-22 RX ORDER — SIMVASTATIN 40 MG
40 TABLET ORAL NIGHTLY
Qty: 90 TABLET | Refills: 0 | Status: SHIPPED | OUTPATIENT
Start: 2024-10-22 | End: 2025-10-22

## 2024-10-30 ENCOUNTER — OFFICE VISIT (OUTPATIENT)
Dept: PRIMARY CARE CLINIC | Age: 79
End: 2024-10-30

## 2024-10-30 VITALS
SYSTOLIC BLOOD PRESSURE: 101 MMHG | OXYGEN SATURATION: 95 % | HEIGHT: 64 IN | WEIGHT: 173.4 LBS | DIASTOLIC BLOOD PRESSURE: 68 MMHG | BODY MASS INDEX: 29.6 KG/M2 | HEART RATE: 82 BPM | TEMPERATURE: 97.5 F

## 2024-10-30 DIAGNOSIS — H91.93 BILATERAL HEARING LOSS, UNSPECIFIED HEARING LOSS TYPE: ICD-10-CM

## 2024-10-30 DIAGNOSIS — R06.01 ORTHOPNEA: ICD-10-CM

## 2024-10-30 DIAGNOSIS — I48.91 ATRIAL FIBRILLATION WITH RVR (HCC): ICD-10-CM

## 2024-10-30 DIAGNOSIS — Z23 NEED FOR INFLUENZA VACCINATION: ICD-10-CM

## 2024-10-30 DIAGNOSIS — K21.9 GASTROESOPHAGEAL REFLUX DISEASE WITHOUT ESOPHAGITIS: ICD-10-CM

## 2024-10-30 DIAGNOSIS — Z00.00 MEDICARE ANNUAL WELLNESS VISIT, SUBSEQUENT: Primary | ICD-10-CM

## 2024-10-30 DIAGNOSIS — I10 ESSENTIAL HYPERTENSION: ICD-10-CM

## 2024-10-30 DIAGNOSIS — I50.30 HEART FAILURE WITH PRESERVED EJECTION FRACTION, UNSPECIFIED HF CHRONICITY (HCC): ICD-10-CM

## 2024-10-30 SDOH — ECONOMIC STABILITY: FOOD INSECURITY: WITHIN THE PAST 12 MONTHS, THE FOOD YOU BOUGHT JUST DIDN'T LAST AND YOU DIDN'T HAVE MONEY TO GET MORE.: NEVER TRUE

## 2024-10-30 SDOH — ECONOMIC STABILITY: FOOD INSECURITY: WITHIN THE PAST 12 MONTHS, YOU WORRIED THAT YOUR FOOD WOULD RUN OUT BEFORE YOU GOT MONEY TO BUY MORE.: NEVER TRUE

## 2024-10-30 SDOH — ECONOMIC STABILITY: INCOME INSECURITY: HOW HARD IS IT FOR YOU TO PAY FOR THE VERY BASICS LIKE FOOD, HOUSING, MEDICAL CARE, AND HEATING?: NOT HARD AT ALL

## 2024-10-30 ASSESSMENT — PATIENT HEALTH QUESTIONNAIRE - PHQ9
SUM OF ALL RESPONSES TO PHQ QUESTIONS 1-9: 0
1. LITTLE INTEREST OR PLEASURE IN DOING THINGS: NOT AT ALL
2. FEELING DOWN, DEPRESSED OR HOPELESS: NOT AT ALL
SUM OF ALL RESPONSES TO PHQ9 QUESTIONS 1 & 2: 0

## 2024-10-30 ASSESSMENT — LIFESTYLE VARIABLES
HOW OFTEN DO YOU HAVE A DRINK CONTAINING ALCOHOL: MONTHLY OR LESS
HOW MANY STANDARD DRINKS CONTAINING ALCOHOL DO YOU HAVE ON A TYPICAL DAY: 1 OR 2

## 2024-10-30 NOTE — PATIENT INSTRUCTIONS
least 150 minutes of exercise per week or 10,000 steps per day on a pedometer .  Order or download the FREE \"Exercise & Physical Activity: Your Everyday Guide\" from [employer]. Call 1-454.562.3345 or search [employer] online.  You need 5532-9885 mg of calcium and 8505-9555 IU of vitamin D per day. It is possible to meet your calcium requirement with diet alone, but a vitamin D supplement is usually necessary to meet this goal.  When exposed to the sun, use a sunscreen that protects against both UVA and UVB radiation with an SPF of 30 or greater. Reapply every 2 to 3 hours or after sweating, drying off with a towel, or swimming.  Always wear a seat belt when traveling in a car. Always wear a helmet when riding a bicycle or motorcycle.

## 2024-11-17 DIAGNOSIS — K21.9 GASTROESOPHAGEAL REFLUX DISEASE WITHOUT ESOPHAGITIS: ICD-10-CM

## 2024-11-18 RX ORDER — OMEPRAZOLE 40 MG/1
40 CAPSULE, DELAYED RELEASE ORAL
Qty: 30 CAPSULE | Refills: 5 | Status: SHIPPED | OUTPATIENT
Start: 2024-11-18 | End: 2025-11-18

## 2025-01-18 DIAGNOSIS — I10 ESSENTIAL HYPERTENSION: ICD-10-CM

## 2025-01-20 RX ORDER — SIMVASTATIN 40 MG
40 TABLET ORAL NIGHTLY
Qty: 90 TABLET | Refills: 1 | Status: SHIPPED | OUTPATIENT
Start: 2025-01-20 | End: 2026-01-20

## 2025-03-18 ENCOUNTER — OFFICE VISIT (OUTPATIENT)
Dept: PRIMARY CARE CLINIC | Age: 80
End: 2025-03-18
Payer: COMMERCIAL

## 2025-03-18 ENCOUNTER — HOSPITAL ENCOUNTER (OUTPATIENT)
Age: 80
Discharge: HOME | End: 2025-03-20
Payer: COMMERCIAL

## 2025-03-18 ENCOUNTER — TELEPHONE (OUTPATIENT)
Dept: PRIMARY CARE CLINIC | Age: 80
End: 2025-03-18

## 2025-03-18 ENCOUNTER — HOSPITAL ENCOUNTER (OUTPATIENT)
Dept: GENERAL RADIOLOGY | Age: 80
Discharge: HOME | End: 2025-03-20
Payer: COMMERCIAL

## 2025-03-18 VITALS
SYSTOLIC BLOOD PRESSURE: 119 MMHG | HEART RATE: 130 BPM | TEMPERATURE: 97.2 F | DIASTOLIC BLOOD PRESSURE: 84 MMHG | BODY MASS INDEX: 29.52 KG/M2 | WEIGHT: 172 LBS | OXYGEN SATURATION: 98 %

## 2025-03-18 DIAGNOSIS — J32.9 BACTERIAL SINUSITIS: ICD-10-CM

## 2025-03-18 DIAGNOSIS — R05.3 PERSISTENT DRY COUGH: ICD-10-CM

## 2025-03-18 DIAGNOSIS — B96.89 BACTERIAL SINUSITIS: Primary | ICD-10-CM

## 2025-03-18 DIAGNOSIS — J32.9 BACTERIAL SINUSITIS: Primary | ICD-10-CM

## 2025-03-18 DIAGNOSIS — B96.89 BACTERIAL SINUSITIS: ICD-10-CM

## 2025-03-18 PROCEDURE — 3074F SYST BP LT 130 MM HG: CPT | Performed by: NURSE PRACTITIONER

## 2025-03-18 PROCEDURE — 3079F DIAST BP 80-89 MM HG: CPT | Performed by: NURSE PRACTITIONER

## 2025-03-18 PROCEDURE — 71046 X-RAY EXAM CHEST 2 VIEWS: CPT

## 2025-03-18 PROCEDURE — 1159F MED LIST DOCD IN RCRD: CPT | Performed by: NURSE PRACTITIONER

## 2025-03-18 PROCEDURE — 99213 OFFICE O/P EST LOW 20 MIN: CPT | Performed by: NURSE PRACTITIONER

## 2025-03-18 PROCEDURE — 1124F ACP DISCUSS-NO DSCNMKR DOCD: CPT | Performed by: NURSE PRACTITIONER

## 2025-03-18 RX ORDER — DOXYCYCLINE HYCLATE 100 MG
100 TABLET ORAL 2 TIMES DAILY
Qty: 14 TABLET | Refills: 0 | Status: SHIPPED | OUTPATIENT
Start: 2025-03-18 | End: 2025-03-25

## 2025-03-18 RX ORDER — BENZONATATE 100 MG/1
100 CAPSULE ORAL 3 TIMES DAILY PRN
Qty: 30 CAPSULE | Refills: 0 | Status: SHIPPED | OUTPATIENT
Start: 2025-03-18 | End: 2025-03-28

## 2025-03-18 SDOH — ECONOMIC STABILITY: FOOD INSECURITY: WITHIN THE PAST 12 MONTHS, THE FOOD YOU BOUGHT JUST DIDN'T LAST AND YOU DIDN'T HAVE MONEY TO GET MORE.: NEVER TRUE

## 2025-03-18 SDOH — ECONOMIC STABILITY: FOOD INSECURITY: WITHIN THE PAST 12 MONTHS, YOU WORRIED THAT YOUR FOOD WOULD RUN OUT BEFORE YOU GOT MONEY TO BUY MORE.: NEVER TRUE

## 2025-03-18 NOTE — TELEPHONE ENCOUNTER
I did not receive any e-visit submission for her, is showing it is completed on her end and MyChart?

## 2025-03-18 NOTE — PROGRESS NOTES
Expected Date:   3/18/2025     Expiration Date:   3/18/2026     Orders Placed This Encounter   Medications    doxycycline hyclate (VIBRA-TABS) 100 MG tablet     Sig: Take 1 tablet by mouth 2 times daily for 7 days     Dispense:  14 tablet     Refill:  0    benzonatate (TESSALON) 100 MG capsule     Sig: Take 1 capsule by mouth 3 times daily as needed for Cough     Dispense:  30 capsule     Refill:  0       The patient (or guardian, if applicable) and other individuals in attendance with the patient were advised that Artificial Intelligence will be utilized during this visit to record, process the conversation to generate a clinical note, and support improvement of the AI technology. The patient (or guardian, if applicable) and other individuals in attendance at the appointment consented to the use of AI, including the recording.      Reviewed health maintenance, prior labs and imaging.   Patient given educational materials - see patient instructions.    Discussed use, benefit, and side effects of prescribed medications. Barriers to medication compliance addressed.   All patient questions answered.  Pt voiced understanding to plan of care.   Instructed to continue medications as discussed, healthy diet and exercise.    Patient agreed with treatment plan. Follow up as directed below.     This note is created with the assistance of a speech-recognition program. While intending to generate a document that actually reflects the content of the visit, no guarantees can be provided that every mistake has been identified and corrected by editing.    Electronically signed by JUDAH Gomez CNP, APRN-CNP on 3/18/2025 at 2:55 PM

## 2025-03-20 ENCOUNTER — RESULTS FOLLOW-UP (OUTPATIENT)
Dept: GENERAL RADIOLOGY | Age: 80
End: 2025-03-20

## 2025-04-22 NOTE — PLAN OF CARE
Problem: SAFETY  Goal: Free from accidental physical injury  Outcome: Ongoing    Goal: Free from intentional harm  Outcome: Ongoing      Problem: DAILY CARE  Goal: Daily care needs are met  Outcome: Ongoing      Problem: Falls - Risk of  Goal: Absence of falls  Outcome: Ongoing verbal cues/2 person assist

## 2025-04-23 ENCOUNTER — TELEPHONE (OUTPATIENT)
Dept: NEUROSURGERY | Age: 80
End: 2025-04-23

## 2025-04-23 RX ORDER — LEVETIRACETAM 500 MG/1
750 TABLET ORAL 2 TIMES DAILY
Qty: 60 TABLET | Refills: 5 | Status: SHIPPED | OUTPATIENT
Start: 2025-04-23

## 2025-04-23 NOTE — TELEPHONE ENCOUNTER
Pharmacist Shashi from [facility] called in requesting clarification for levETIRAcetam (KEPPRA) 500 MG tablet. Quantity is written for only a 20 day supply.     [phone]

## 2025-04-24 RX ORDER — LEVETIRACETAM 500 MG/1
TABLET ORAL
Qty: 60 TABLET | Refills: 0 | OUTPATIENT
Start: 2025-04-24

## 2025-04-25 ENCOUNTER — TELEPHONE (OUTPATIENT)
Dept: NEUROLOGY | Age: 80
End: 2025-04-25

## 2025-04-25 NOTE — TELEPHONE ENCOUNTER
I spoke with Shashi the pharmacist and they needing a full 30 day script for pt Taylorra and I change the quantity to 90 tablets instead of 60 tablets.

## 2025-05-02 NOTE — TELEPHONE ENCOUNTER
Looks like Angelo already addressed that? Patient still does not have a follow up appointment with neurology BTW, can we please, make sure that she does? Thank you

## 2025-05-22 ENCOUNTER — HOSPITAL ENCOUNTER (OUTPATIENT)
Age: 80
Setting detail: OUTPATIENT SURGERY
Discharge: HOME | End: 2025-05-24
Attending: INTERNAL MEDICINE
Payer: COMMERCIAL

## 2025-05-22 VITALS
HEART RATE: 75 BPM | RESPIRATION RATE: 16 BRPM | TEMPERATURE: 97.9 F | SYSTOLIC BLOOD PRESSURE: 130 MMHG | OXYGEN SATURATION: 99 % | BODY MASS INDEX: 29.7 KG/M2 | DIASTOLIC BLOOD PRESSURE: 63 MMHG | WEIGHT: 173 LBS

## 2025-05-22 DIAGNOSIS — I48.20 CHRONIC A-FIB (HCC): ICD-10-CM

## 2025-05-22 LAB
BUN BLD-MCNC: 13 MG/DL (ref 8–26)
CA-I BLD-SCNC: 1.24 MMOL/L (ref 1.15–1.33)
CHLORIDE BLD-SCNC: 100 MMOL/L (ref 98–107)
CO2 BLD CALC-SCNC: 36 MMOL/L (ref 22–30)
EGFR, POC: 74 ML/MIN/1.73M2
GLUCOSE BLD-MCNC: 88 MG/DL (ref 74–100)
HCT VFR BLD AUTO: 39 % (ref 36–46)
POC ANION GAP: 10 MMOL/L (ref 7–16)
POC CREATININE: 0.8 MG/DL (ref 0.51–1.19)
POC HEMOGLOBIN (CALC): 13.4 G/DL (ref 12–16)
POC LACTIC ACID: 1.8 MMOL/L (ref 0.56–1.39)
POTASSIUM BLD-SCNC: 3.5 MMOL/L (ref 3.5–4.5)
SODIUM BLD-SCNC: 145 MMOL/L (ref 138–146)

## 2025-05-22 PROCEDURE — 99152 MOD SED SAME PHYS/QHP 5/>YRS: CPT

## 2025-05-22 PROCEDURE — 7100000010 HC PHASE II RECOVERY - FIRST 15 MIN

## 2025-05-22 PROCEDURE — 82947 ASSAY GLUCOSE BLOOD QUANT: CPT

## 2025-05-22 PROCEDURE — 7100000011 HC PHASE II RECOVERY - ADDTL 15 MIN

## 2025-05-22 PROCEDURE — 92960 CARDIOVERSION ELECTRIC EXT: CPT

## 2025-05-22 PROCEDURE — 82565 ASSAY OF CREATININE: CPT

## 2025-05-22 PROCEDURE — 6360000002 HC RX W HCPCS

## 2025-05-22 PROCEDURE — 82330 ASSAY OF CALCIUM: CPT

## 2025-05-22 PROCEDURE — 93005 ELECTROCARDIOGRAM TRACING: CPT | Performed by: INTERNAL MEDICINE

## 2025-05-22 PROCEDURE — 6360000002 HC RX W HCPCS: Performed by: INTERNAL MEDICINE

## 2025-05-22 PROCEDURE — 85014 HEMATOCRIT: CPT

## 2025-05-22 PROCEDURE — 80051 ELECTROLYTE PANEL: CPT

## 2025-05-22 PROCEDURE — 2580000003 HC RX 258: Performed by: INTERNAL MEDICINE

## 2025-05-22 PROCEDURE — 83605 ASSAY OF LACTIC ACID: CPT

## 2025-05-22 PROCEDURE — 84520 ASSAY OF UREA NITROGEN: CPT

## 2025-05-22 RX ORDER — SODIUM CHLORIDE 9 MG/ML
INJECTION, SOLUTION INTRAVENOUS CONTINUOUS
Status: DISCONTINUED | OUTPATIENT
Start: 2025-05-22 | End: 2025-05-25 | Stop reason: HOSPADM

## 2025-05-22 RX ORDER — FENTANYL CITRATE 50 UG/ML
INJECTION, SOLUTION INTRAMUSCULAR; INTRAVENOUS PRN
Status: COMPLETED | OUTPATIENT
Start: 2025-05-22 | End: 2025-05-22

## 2025-05-22 RX ORDER — AMIODARONE HYDROCHLORIDE 200 MG/1
TABLET ORAL
Qty: 264 TABLET | Refills: 0 | Status: SHIPPED | OUTPATIENT
Start: 2025-05-22 | End: 2025-12-16

## 2025-05-22 RX ORDER — MIDAZOLAM HYDROCHLORIDE 1 MG/ML
INJECTION, SOLUTION INTRAMUSCULAR; INTRAVENOUS PRN
Status: COMPLETED | OUTPATIENT
Start: 2025-05-22 | End: 2025-05-22

## 2025-05-22 RX ADMIN — FENTANYL CITRATE 50 MCG: 50 INJECTION, SOLUTION INTRAMUSCULAR; INTRAVENOUS at 13:50

## 2025-05-22 RX ADMIN — MIDAZOLAM 2 MG: 1 INJECTION INTRAMUSCULAR; INTRAVENOUS at 13:50

## 2025-05-22 RX ADMIN — PROPOFOL 35 MG: 10 INJECTION, EMULSION INTRAVENOUS at 13:52

## 2025-05-22 RX ADMIN — SODIUM CHLORIDE: 9 INJECTION, SOLUTION INTRAVENOUS at 13:03

## 2025-05-22 NOTE — PROGRESS NOTES
TRANSFER - OUT REPORT:    Verbal report given to Irina YANES on Chantell Yañez being transferred to [facility]  for routine progression of patient care       Report consisted of patient's Situation, Background, Assessment and   Recommendations(SBAR).     Information from the following report(s) Nurse Handoff Report was reviewed with the receiving nurse.    Opportunity for questions and clarification was provided.      Patient transported with:   Registered Nurse

## 2025-05-22 NOTE — PROCEDURES
[facility]  Cardioversion Procedure Note         Today's Date: 5/22/2025  Primary/Ordering Cardiologist: Dr Blackwell  Indication: persistent AF    Patient seen and examined. History and Physical reviewed. Labs reviewed.    After informed consent was obtained with explanation of the risks and benefits, the patient was prepared using standard tecqniques.     All Conscious Sedation was administered via the Cardiologist.     CARDIOVERSION:    After an adequate level of sedation was achieved  200J in biphasic synchronized delivery was administered.   no change in rhythm. 360J administered and still remained in atrial fibrillation after briefly converting to sinus rhythm.    The patient awoke without complications. A post procedure 12 L ECG was ordered and reviewed.      Impression:  Successful Consious Sedation - safely  Unsuccessful Cardioversion  We will load the patient with amiodarone and will reevaluate for need for reattempt of cardioversion/ablation at a later time.      Complications:  There were no complications encountered.      The patient will continue with the discharge meds and has been instructed to follow-up with Dr. Blackwell for continued long term care and cardiovascular management. There were no complications encountered.      Electronically signed on 05/22/25 at 1:48 PM by:    Macy Qureshi MD, MD   Fellow, Cardiovascular Diseases  [facility]    Attending Physician Statement  I have discussed the case of Chantell Yañez including pertinent history and exam findings with the student/resident/fellow. I have seen and examined the patient and the key elements of the encounter have been performed by me. I agree with the assessment, plan and orders as documented by the resident With changes made to the note.  I was present during entire procedure and performed all critical elements of the procedure    Electronically signed by Chau Blackwell MD on 5/22/2025 at 4:42

## 2025-05-22 NOTE — PROGRESS NOTES
All discharge instructions reviewed, questions answered, paper signed and given copy. Patient discharged per wheelchair with family, d/c paperwork and belongings.

## 2025-05-22 NOTE — PROGRESS NOTES
Patient admitted, consent signed and questions answered. Patient ready for procedure. Call light to reach with side rails up 2 of 2.   Family at bedside with patient.  History and physical complete.

## 2025-05-22 NOTE — DISCHARGE INSTRUCTIONS
Discharge Instructions for Cardioversion     Cardioversion is a procedure used to shock the heart back into a normal rhythm. Paddles or electrodes applied to the chest are used to deliver an electric shock to the heart so that it will return to a normal rhythm.       What You Will Need   Someone to drive you home from the hospital  NO DRIVING FOR 24 HOURS     Home Care   You may feel sleepy after the procedure because of anesthesia or a sedative. Rest when you arrive home.   If your chest skin is irritated, apply an ointment or hydrocortisone cream, as directed by your doctor. Do not apply any bandages.  Diet   Resume your normal diet after the procedure     Physical Activity   You may feel sleepy after the procedure and should rest as needed. In addition:   Rest as needed.   Avoid heavy lifting or strenuous activity immediately following your procedure.   Ask your doctor when you can resume normal physical activity (typically, the day after the cardioversion).      Medications   You may be given a medicine called an anti-arrhythmic. This type of drug prevents your heart from having abnormal rhythms. You may also be told to take a blood thinner medication to prevent blood clots. Your doctor will tell you when you should resume any normal medications.   If you are taking medications, follow these general guidelines:   Take your medication as directed. Do not change the amount or the schedule.   Do not stop taking them without talking to your doctor.   Do not share them.   Know what the results and side effects. Report them to your doctor.   Some drugs can be dangerous when mixed. Talk to a doctor or pharmacist if you are taking more than one drug. This includes over-the-counter medication and herb or dietary supplements.   Plan ahead for refills so you don't run out.       Follow-up   Schedule a follow-up appointment as directed by your doctor. If you are started on blood thinner medication, you may need regular

## 2025-05-23 LAB
EKG ATRIAL RATE: 113 BPM
EKG Q-T INTERVAL: 400 MS
EKG QRS DURATION: 86 MS
EKG QTC CALCULATION (BAZETT): 452 MS
EKG R AXIS: 56 DEGREES
EKG T AXIS: 31 DEGREES
EKG VENTRICULAR RATE: 77 BPM

## 2025-05-27 DIAGNOSIS — K21.9 GASTROESOPHAGEAL REFLUX DISEASE WITHOUT ESOPHAGITIS: ICD-10-CM

## 2025-05-27 RX ORDER — OMEPRAZOLE 40 MG/1
CAPSULE, DELAYED RELEASE ORAL
Qty: 30 CAPSULE | Refills: 0 | Status: SHIPPED | OUTPATIENT
Start: 2025-05-27

## 2025-06-02 NOTE — H&P (VIEW-ONLY)
[facility]  [facility] [place]  [address], [address]  [facility] [address] [phone]         HPI:    Patient is here today for follow-up post recent attempted cardioversion which was not successful.    She has been increasingly short of breath with activity and yesterday had to take additional Lasix with improvement.  She feels much better when she is in normal sinus rhythm compared to atrial fibrillation.  She was started on amiodarone loading.  Continued on anticoagulation in addition to beta-blockers.  She denies any orthopnea or PND.  No dizziness near-syncope or syncope    PROBLEM LIST:    Past Medical History:   Diagnosis Date    Aneurysm of abdominal aorta     Atrial fibrillation (HCC)     Cancer (HCC)     breast    ETD (eustachian tube dysfunction)     Facial asymmetries     Fracture, ankle     Gastric ulcer     Headache     History of radiation therapy     right breast cancer    Hx of cardiac cath     Hx of gastroenteritis hx of noninfective gastroenteritis and colitis    Hyperlipidemia     Hypertension     IBS (irritable bowel syndrome)     Jaw pain, non-TMJ     Mammogram abnormal     Mitral valve prolapse syndrome     Pregnancy, incidental     Seizures (HCC)      Current Outpatient Medications   Medication Sig Dispense Refill    metoprolol tartrate (LOPRESSOR) 50 MG tablet Take 1 tablet by mouth 2 times daily      omeprazole (PRILOSEC) 40 MG delayed release capsule TAKE 1 CAPSULE BY MOUTH IN THE MORNING BEFORE BREAKFAST 30 capsule 0    amiodarone (CORDARONE) 200 MG tablet Take 2 tablets by mouth 2 times daily for 14 days, THEN 1 tablet 2 times daily for 14 days, THEN 1 tablet daily. 264 tablet 0    apixaban (ELIQUIS) 5 MG TABS tablet Take 1 tablet by mouth 2 times daily 360 tablet 0    levETIRAcetam (KEPPRA) 500 MG tablet Take 1.5 tablets by mouth 2 times daily 60 tablet 5    simvastatin (ZOCOR) 40 MG tablet Take 1 tablet by mouth nightly 90 tablet 1

## 2025-06-24 DIAGNOSIS — K21.9 GASTROESOPHAGEAL REFLUX DISEASE WITHOUT ESOPHAGITIS: ICD-10-CM

## 2025-06-24 RX ORDER — OMEPRAZOLE 40 MG/1
CAPSULE, DELAYED RELEASE ORAL
Qty: 30 CAPSULE | Refills: 1 | Status: ON HOLD | OUTPATIENT
Start: 2025-06-24

## 2025-06-26 ENCOUNTER — HOSPITAL ENCOUNTER (OUTPATIENT)
Age: 80
Setting detail: OUTPATIENT SURGERY
Discharge: HOME | End: 2025-06-28
Attending: INTERNAL MEDICINE
Payer: COMMERCIAL

## 2025-06-26 VITALS
HEIGHT: 64 IN | DIASTOLIC BLOOD PRESSURE: 64 MMHG | TEMPERATURE: 97.3 F | SYSTOLIC BLOOD PRESSURE: 90 MMHG | BODY MASS INDEX: 29.47 KG/M2 | HEART RATE: 60 BPM | WEIGHT: 172.6 LBS | OXYGEN SATURATION: 95 % | RESPIRATION RATE: 22 BRPM

## 2025-06-26 DIAGNOSIS — I48.91 ATRIAL FIBRILLATION (HCC): ICD-10-CM

## 2025-06-26 LAB
BUN BLD-MCNC: 15 MG/DL (ref 8–26)
CHLORIDE BLD-SCNC: 99 MMOL/L (ref 98–107)
ECHO BSA: 1.88 M2
EGFR, POC: 74 ML/MIN/1.73M2
GLUCOSE BLD-MCNC: 89 MG/DL (ref 74–100)
HCT VFR BLD AUTO: 38 % (ref 36–46)
POC CREATININE: 0.8 MG/DL (ref 0.51–1.19)
POC HEMOGLOBIN (CALC): 13 G/DL (ref 12–16)
POTASSIUM BLD-SCNC: 5.1 MMOL/L (ref 3.5–4.5)
SODIUM BLD-SCNC: 140 MMOL/L (ref 138–146)

## 2025-06-26 PROCEDURE — 82947 ASSAY GLUCOSE BLOOD QUANT: CPT

## 2025-06-26 PROCEDURE — 2580000003 HC RX 258: Performed by: INTERNAL MEDICINE

## 2025-06-26 PROCEDURE — 92960 CARDIOVERSION ELECTRIC EXT: CPT

## 2025-06-26 PROCEDURE — 82435 ASSAY OF BLOOD CHLORIDE: CPT

## 2025-06-26 PROCEDURE — 85014 HEMATOCRIT: CPT

## 2025-06-26 PROCEDURE — 93005 ELECTROCARDIOGRAM TRACING: CPT | Performed by: INTERNAL MEDICINE

## 2025-06-26 PROCEDURE — 2580000003 HC RX 258: Performed by: STUDENT IN AN ORGANIZED HEALTH CARE EDUCATION/TRAINING PROGRAM

## 2025-06-26 PROCEDURE — 84520 ASSAY OF UREA NITROGEN: CPT

## 2025-06-26 PROCEDURE — 84295 ASSAY OF SERUM SODIUM: CPT

## 2025-06-26 PROCEDURE — 82565 ASSAY OF CREATININE: CPT

## 2025-06-26 PROCEDURE — 6360000002 HC RX W HCPCS: Performed by: INTERNAL MEDICINE

## 2025-06-26 PROCEDURE — 7100000011 HC PHASE II RECOVERY - ADDTL 15 MIN: Performed by: STUDENT IN AN ORGANIZED HEALTH CARE EDUCATION/TRAINING PROGRAM

## 2025-06-26 PROCEDURE — 84132 ASSAY OF SERUM POTASSIUM: CPT

## 2025-06-26 PROCEDURE — 99152 MOD SED SAME PHYS/QHP 5/>YRS: CPT | Performed by: STUDENT IN AN ORGANIZED HEALTH CARE EDUCATION/TRAINING PROGRAM

## 2025-06-26 PROCEDURE — 5A2204Z RESTORATION OF CARDIAC RHYTHM, SINGLE: ICD-10-PCS | Performed by: INTERNAL MEDICINE

## 2025-06-26 PROCEDURE — 6360000002 HC RX W HCPCS: Performed by: STUDENT IN AN ORGANIZED HEALTH CARE EDUCATION/TRAINING PROGRAM

## 2025-06-26 PROCEDURE — 7100000010 HC PHASE II RECOVERY - FIRST 15 MIN: Performed by: STUDENT IN AN ORGANIZED HEALTH CARE EDUCATION/TRAINING PROGRAM

## 2025-06-26 RX ORDER — MIDAZOLAM HYDROCHLORIDE 1 MG/ML
INJECTION, SOLUTION INTRAMUSCULAR; INTRAVENOUS PRN
Status: COMPLETED | OUTPATIENT
Start: 2025-06-26 | End: 2025-06-26

## 2025-06-26 RX ORDER — FENTANYL CITRATE 50 UG/ML
INJECTION, SOLUTION INTRAMUSCULAR; INTRAVENOUS PRN
Status: COMPLETED | OUTPATIENT
Start: 2025-06-26 | End: 2025-06-26

## 2025-06-26 RX ORDER — 0.9 % SODIUM CHLORIDE 0.9 %
INTRAVENOUS SOLUTION INTRAVENOUS CONTINUOUS PRN
Status: COMPLETED | OUTPATIENT
Start: 2025-06-26 | End: 2025-06-26

## 2025-06-26 RX ORDER — SODIUM CHLORIDE 9 MG/ML
INJECTION, SOLUTION INTRAVENOUS CONTINUOUS
Status: DISCONTINUED | OUTPATIENT
Start: 2025-06-26 | End: 2025-06-29 | Stop reason: HOSPADM

## 2025-06-26 RX ADMIN — PROPOFOL 40 MG: 10 INJECTION, EMULSION INTRAVENOUS at 15:09

## 2025-06-26 RX ADMIN — SODIUM CHLORIDE 250 ML: 0.9 INJECTION, SOLUTION INTRAVENOUS at 15:20

## 2025-06-26 RX ADMIN — MIDAZOLAM 2 MG: 1 INJECTION INTRAMUSCULAR; INTRAVENOUS at 15:09

## 2025-06-26 RX ADMIN — FENTANYL CITRATE 50 MCG: 50 INJECTION, SOLUTION INTRAMUSCULAR; INTRAVENOUS at 15:09

## 2025-06-26 RX ADMIN — SODIUM CHLORIDE: 0.9 INJECTION, SOLUTION INTRAVENOUS at 14:11

## 2025-06-26 NOTE — INTERVAL H&P NOTE
Update History & Physical    The patient's History and Physical of June 2, 2025 was reviewed with the patient and I examined the patient. There was no change. The surgical site was confirmed by the patient and me.     Plan: The risks, benefits, expected outcome, and alternative to the recommended procedure have been discussed with the patient. Patient understands and wants to proceed with the procedure.     Electronically signed by Dwayne Hutchinson MD on 6/26/2025 at 2:54 PM

## 2025-06-26 NOTE — PROGRESS NOTES
TRANSFER - OUT REPORT:    Verbal report given to JOAQUÍN Arevalo on Chantell Yañez being transferred to [facility] for routine post-op       Report consisted of patient's Situation, Background, Assessment and   Recommendations(SBAR).     Information from the following report(s) Nurse Handoff Report was reviewed with the receiving nurse.    Opportunity for questions and clarification was provided.      Patient transported with:   Registered Nurse

## 2025-06-26 NOTE — PROGRESS NOTES
Received post procedure to [facility] to room 5. Assessment obtained. Restrictions reviewed with patient. Post procedure pathway initiated.  Family at side.  Patient without complaints.

## 2025-06-26 NOTE — PROCEDURES
[facility]  Cardioversion Procedure Note         Today's Date: 6/26/2025  Primary Cardiologist: Chau Blackwell MD (Attending Physician)  Indication: Afib    Pre Procedure Conscious Sedation Data:  ASA Class:    [] I [x] II [] III [] IV    Mallampati Class:  [] I [x] II [] III [] IV    Patient seen and examined. History and Physical reviewed. Labs reviewed.    After informed consent was obtained with explanation of the risks and benefits, the patient was prepared using standard tecqniques.     All Conscious Sedation was administered via the Cardiologist.     CARDIOVERSION:  After an adequate level of sedation was achieved  200J in biphasic synchronized delivery was administered.   conversion to normal sinus rhythm.     Patient woke up without complications. A post procedure 12 Lead ECG was ordered.    Impression:  Successful Consious Sedation - safely  Successful Cardioversion    Complications:  There were no complications encountered.      Dwayne Hutchinson MD  Fellow, cardiovascular disease   [facility], [place], [place]      Attending Physician Statement  I have discussed the case of Chantell Yañez including pertinent history and exam findings with the student/resident/fellow. I have seen and examined the patient and the key elements of the encounter have been performed by me. I agree with the assessment, plan and orders as documented by the resident With changes made to the note.  I was present during entire procedure and performed all critical elements of the procedure    Electronically signed by Chau Blackwell MD on 6/26/2025 at 3:45 PM.    [facility]      [phone]

## 2025-06-27 ENCOUNTER — APPOINTMENT (OUTPATIENT)
Dept: ULTRASOUND IMAGING | Age: 80
DRG: 291 | End: 2025-06-27
Payer: COMMERCIAL

## 2025-06-27 ENCOUNTER — APPOINTMENT (OUTPATIENT)
Dept: CT IMAGING | Age: 80
DRG: 291 | End: 2025-06-27
Payer: COMMERCIAL

## 2025-06-27 ENCOUNTER — HOSPITAL ENCOUNTER (INPATIENT)
Age: 80
LOS: 4 days | Discharge: HOME | DRG: 291 | End: 2025-07-01
Attending: EMERGENCY MEDICINE | Admitting: STUDENT IN AN ORGANIZED HEALTH CARE EDUCATION/TRAINING PROGRAM
Payer: COMMERCIAL

## 2025-06-27 DIAGNOSIS — R06.02 SHORTNESS OF BREATH: Primary | ICD-10-CM

## 2025-06-27 DIAGNOSIS — I50.9 ACUTE CONGESTIVE HEART FAILURE, UNSPECIFIED HEART FAILURE TYPE (HCC): ICD-10-CM

## 2025-06-27 PROBLEM — J96.01 ACUTE HYPOXIC RESPIRATORY FAILURE (HCC): Status: ACTIVE | Noted: 2025-06-27

## 2025-06-27 LAB
ALBUMIN SERPL-MCNC: 4.3 G/DL (ref 3.5–5.2)
ALBUMIN/GLOB SERPL: 1.5 {RATIO} (ref 1–2.5)
ALP SERPL-CCNC: 96 U/L (ref 35–104)
ALT SERPL-CCNC: 19 U/L (ref 10–35)
ANION GAP SERPL CALCULATED.3IONS-SCNC: 14 MMOL/L (ref 9–16)
AST SERPL-CCNC: 32 U/L (ref 10–35)
BASOPHILS # BLD: 0.07 K/UL (ref 0–0.2)
BASOPHILS NFR BLD: 1 % (ref 0–2)
BILIRUB DIRECT SERPL-MCNC: 0.3 MG/DL (ref 0–0.2)
BILIRUB INDIRECT SERPL-MCNC: 0.5 MG/DL (ref 0–1)
BILIRUB SERPL-MCNC: 0.8 MG/DL (ref 0–1.2)
BNP SERPL-MCNC: 2951 PG/ML (ref 0–450)
BUN SERPL-MCNC: 15 MG/DL (ref 8–23)
CALCIUM SERPL-MCNC: 9.3 MG/DL (ref 8.6–10.4)
CHLORIDE SERPL-SCNC: 99 MMOL/L (ref 98–107)
CO2 SERPL-SCNC: 28 MMOL/L (ref 20–31)
CREAT SERPL-MCNC: 1.1 MG/DL (ref 0.6–0.9)
EKG ATRIAL RATE: 300 BPM
EKG Q-T INTERVAL: 402 MS
EKG QRS DURATION: 106 MS
EKG QTC CALCULATION (BAZETT): 483 MS
EKG R AXIS: 53 DEGREES
EKG T AXIS: 16 DEGREES
EKG VENTRICULAR RATE: 87 BPM
EOSINOPHIL # BLD: 0.11 K/UL (ref 0–0.44)
EOSINOPHILS RELATIVE PERCENT: 1 % (ref 1–4)
ERYTHROCYTE [DISTWIDTH] IN BLOOD BY AUTOMATED COUNT: 14.4 % (ref 11.8–14.4)
GFR, ESTIMATED: 50 ML/MIN/1.73M2
GLOBULIN SER CALC-MCNC: 2.8 G/DL
GLUCOSE SERPL-MCNC: 116 MG/DL (ref 74–99)
HCT VFR BLD AUTO: 37.9 % (ref 36.3–47.1)
HGB BLD-MCNC: 11.9 G/DL (ref 11.9–15.1)
IMM GRANULOCYTES # BLD AUTO: 0.07 K/UL (ref 0–0.3)
IMM GRANULOCYTES NFR BLD: 1 %
LIPASE SERPL-CCNC: 13 U/L (ref 13–60)
LYMPHOCYTES NFR BLD: 1.51 K/UL (ref 1.1–3.7)
LYMPHOCYTES RELATIVE PERCENT: 12 % (ref 24–43)
MAGNESIUM SERPL-MCNC: 1.7 MG/DL (ref 1.6–2.4)
MCH RBC QN AUTO: 29.2 PG (ref 25.2–33.5)
MCHC RBC AUTO-ENTMCNC: 31.4 G/DL (ref 28.4–34.8)
MCV RBC AUTO: 93.1 FL (ref 82.6–102.9)
MONOCYTES NFR BLD: 1.18 K/UL (ref 0.1–1.2)
MONOCYTES NFR BLD: 10 % (ref 3–12)
NEUTROPHILS NFR BLD: 75 % (ref 36–65)
NEUTS SEG NFR BLD: 9.43 K/UL (ref 1.5–8.1)
NRBC BLD-RTO: 0 PER 100 WBC
PLATELET # BLD AUTO: 218 K/UL (ref 138–453)
PMV BLD AUTO: 9.4 FL (ref 8.1–13.5)
POTASSIUM SERPL-SCNC: 4.1 MMOL/L (ref 3.7–5.3)
PROT SERPL-MCNC: 7.1 G/DL (ref 6.6–8.7)
RBC # BLD AUTO: 4.07 M/UL (ref 3.95–5.11)
SODIUM SERPL-SCNC: 141 MMOL/L (ref 136–145)
TROPONIN I SERPL HS-MCNC: 13 NG/L (ref 0–14)
TROPONIN I SERPL HS-MCNC: 13 NG/L (ref 0–14)
WBC OTHER # BLD: 12.4 K/UL (ref 3.5–11.3)

## 2025-06-27 PROCEDURE — 6370000000 HC RX 637 (ALT 250 FOR IP): Performed by: STUDENT IN AN ORGANIZED HEALTH CARE EDUCATION/TRAINING PROGRAM

## 2025-06-27 PROCEDURE — 74177 CT ABD & PELVIS W/CONTRAST: CPT

## 2025-06-27 PROCEDURE — 6360000002 HC RX W HCPCS

## 2025-06-27 PROCEDURE — 2500000003 HC RX 250 WO HCPCS: Performed by: STUDENT IN AN ORGANIZED HEALTH CARE EDUCATION/TRAINING PROGRAM

## 2025-06-27 PROCEDURE — 80076 HEPATIC FUNCTION PANEL: CPT

## 2025-06-27 PROCEDURE — 6360000004 HC RX CONTRAST MEDICATION

## 2025-06-27 PROCEDURE — 83735 ASSAY OF MAGNESIUM: CPT

## 2025-06-27 PROCEDURE — 1200000000 HC SEMI PRIVATE

## 2025-06-27 PROCEDURE — 84484 ASSAY OF TROPONIN QUANT: CPT

## 2025-06-27 PROCEDURE — 83880 ASSAY OF NATRIURETIC PEPTIDE: CPT

## 2025-06-27 PROCEDURE — 80048 BASIC METABOLIC PNL TOTAL CA: CPT

## 2025-06-27 PROCEDURE — 83690 ASSAY OF LIPASE: CPT

## 2025-06-27 PROCEDURE — 71260 CT THORAX DX C+: CPT

## 2025-06-27 PROCEDURE — 99285 EMERGENCY DEPT VISIT HI MDM: CPT

## 2025-06-27 PROCEDURE — 76705 ECHO EXAM OF ABDOMEN: CPT

## 2025-06-27 PROCEDURE — 85025 COMPLETE CBC W/AUTO DIFF WBC: CPT

## 2025-06-27 PROCEDURE — 93005 ELECTROCARDIOGRAM TRACING: CPT

## 2025-06-27 PROCEDURE — 99222 1ST HOSP IP/OBS MODERATE 55: CPT | Performed by: STUDENT IN AN ORGANIZED HEALTH CARE EDUCATION/TRAINING PROGRAM

## 2025-06-27 PROCEDURE — 94640 AIRWAY INHALATION TREATMENT: CPT

## 2025-06-27 RX ORDER — SODIUM CHLORIDE 9 MG/ML
INJECTION, SOLUTION INTRAVENOUS PRN
Status: DISCONTINUED | OUTPATIENT
Start: 2025-06-27 | End: 2025-07-01 | Stop reason: HOSPADM

## 2025-06-27 RX ORDER — POTASSIUM CHLORIDE 7.45 MG/ML
10 INJECTION INTRAVENOUS PRN
Status: DISCONTINUED | OUTPATIENT
Start: 2025-06-27 | End: 2025-07-01 | Stop reason: HOSPADM

## 2025-06-27 RX ORDER — LEVETIRACETAM 500 MG/1
750 TABLET ORAL 2 TIMES DAILY
Status: DISCONTINUED | OUTPATIENT
Start: 2025-06-27 | End: 2025-07-01 | Stop reason: HOSPADM

## 2025-06-27 RX ORDER — POTASSIUM CHLORIDE 1500 MG/1
40 TABLET, EXTENDED RELEASE ORAL PRN
Status: DISCONTINUED | OUTPATIENT
Start: 2025-06-27 | End: 2025-07-01 | Stop reason: HOSPADM

## 2025-06-27 RX ORDER — BUDESONIDE AND FORMOTEROL FUMARATE DIHYDRATE 160; 4.5 UG/1; UG/1
2 AEROSOL RESPIRATORY (INHALATION)
Status: DISCONTINUED | OUTPATIENT
Start: 2025-06-27 | End: 2025-07-01 | Stop reason: HOSPADM

## 2025-06-27 RX ORDER — SODIUM CHLORIDE 0.9 % (FLUSH) 0.9 %
5-40 SYRINGE (ML) INJECTION PRN
Status: DISCONTINUED | OUTPATIENT
Start: 2025-06-27 | End: 2025-07-01 | Stop reason: HOSPADM

## 2025-06-27 RX ORDER — ONDANSETRON 4 MG/1
4 TABLET, ORALLY DISINTEGRATING ORAL EVERY 8 HOURS PRN
Status: DISCONTINUED | OUTPATIENT
Start: 2025-06-27 | End: 2025-07-01 | Stop reason: HOSPADM

## 2025-06-27 RX ORDER — SODIUM CHLORIDE 0.9 % (FLUSH) 0.9 %
5-40 SYRINGE (ML) INJECTION EVERY 12 HOURS SCHEDULED
Status: DISCONTINUED | OUTPATIENT
Start: 2025-06-27 | End: 2025-07-01 | Stop reason: HOSPADM

## 2025-06-27 RX ORDER — PANTOPRAZOLE SODIUM 40 MG/1
40 TABLET, DELAYED RELEASE ORAL
Status: DISCONTINUED | OUTPATIENT
Start: 2025-06-28 | End: 2025-07-01 | Stop reason: HOSPADM

## 2025-06-27 RX ORDER — ACETAMINOPHEN 325 MG/1
650 TABLET ORAL EVERY 6 HOURS PRN
Status: DISCONTINUED | OUTPATIENT
Start: 2025-06-27 | End: 2025-07-01 | Stop reason: HOSPADM

## 2025-06-27 RX ORDER — IOPAMIDOL 755 MG/ML
75 INJECTION, SOLUTION INTRAVASCULAR
Status: COMPLETED | OUTPATIENT
Start: 2025-06-27 | End: 2025-06-27

## 2025-06-27 RX ORDER — MAGNESIUM SULFATE IN WATER 40 MG/ML
2000 INJECTION, SOLUTION INTRAVENOUS PRN
Status: DISCONTINUED | OUTPATIENT
Start: 2025-06-27 | End: 2025-07-01 | Stop reason: HOSPADM

## 2025-06-27 RX ORDER — ACETAMINOPHEN 650 MG/1
650 SUPPOSITORY RECTAL EVERY 6 HOURS PRN
Status: DISCONTINUED | OUTPATIENT
Start: 2025-06-27 | End: 2025-07-01 | Stop reason: HOSPADM

## 2025-06-27 RX ORDER — ATORVASTATIN CALCIUM 20 MG/1
20 TABLET, FILM COATED ORAL DAILY
Status: DISCONTINUED | OUTPATIENT
Start: 2025-06-27 | End: 2025-07-01 | Stop reason: HOSPADM

## 2025-06-27 RX ORDER — ALBUTEROL SULFATE 0.83 MG/ML
2.5 SOLUTION RESPIRATORY (INHALATION)
Status: DISCONTINUED | OUTPATIENT
Start: 2025-06-27 | End: 2025-06-29

## 2025-06-27 RX ORDER — METOPROLOL TARTRATE 50 MG
50 TABLET ORAL 2 TIMES DAILY
Status: DISCONTINUED | OUTPATIENT
Start: 2025-06-27 | End: 2025-06-29

## 2025-06-27 RX ORDER — FUROSEMIDE 10 MG/ML
40 INJECTION INTRAMUSCULAR; INTRAVENOUS ONCE
Status: COMPLETED | OUTPATIENT
Start: 2025-06-27 | End: 2025-06-27

## 2025-06-27 RX ORDER — ALBUTEROL SULFATE 90 UG/1
2 INHALANT RESPIRATORY (INHALATION)
Status: DISCONTINUED | OUTPATIENT
Start: 2025-06-28 | End: 2025-06-28

## 2025-06-27 RX ORDER — AMIODARONE HYDROCHLORIDE 200 MG/1
200 TABLET ORAL DAILY
Status: DISCONTINUED | OUTPATIENT
Start: 2025-06-27 | End: 2025-07-01 | Stop reason: HOSPADM

## 2025-06-27 RX ORDER — ONDANSETRON 2 MG/ML
4 INJECTION INTRAMUSCULAR; INTRAVENOUS EVERY 6 HOURS PRN
Status: DISCONTINUED | OUTPATIENT
Start: 2025-06-27 | End: 2025-07-01 | Stop reason: HOSPADM

## 2025-06-27 RX ADMIN — IOPAMIDOL 75 ML: 755 INJECTION, SOLUTION INTRAVENOUS at 13:52

## 2025-06-27 RX ADMIN — ALBUTEROL SULFATE 2.5 MG: 2.5 SOLUTION RESPIRATORY (INHALATION) at 20:58

## 2025-06-27 RX ADMIN — BUDESONIDE AND FORMOTEROL FUMARATE DIHYDRATE 2 PUFF: 160; 4.5 AEROSOL RESPIRATORY (INHALATION) at 20:53

## 2025-06-27 RX ADMIN — SODIUM CHLORIDE, PRESERVATIVE FREE 10 ML: 5 INJECTION INTRAVENOUS at 20:08

## 2025-06-27 RX ADMIN — AMIODARONE HYDROCHLORIDE 200 MG: 200 TABLET ORAL at 18:45

## 2025-06-27 RX ADMIN — ATORVASTATIN CALCIUM 20 MG: 20 TABLET, FILM COATED ORAL at 18:45

## 2025-06-27 RX ADMIN — LEVETIRACETAM 750 MG: 500 TABLET, FILM COATED ORAL at 20:08

## 2025-06-27 RX ADMIN — APIXABAN 5 MG: 5 TABLET, FILM COATED ORAL at 20:08

## 2025-06-27 RX ADMIN — FUROSEMIDE 40 MG: 10 INJECTION, SOLUTION INTRAMUSCULAR; INTRAVENOUS at 18:45

## 2025-06-27 NOTE — H&P
[facility]  Office: [phone]  Osvaldo Felix DO, Rusty Lundberg, DO, Cecilio Ferreira DO, Diaz Galarza DO, Millicent Blanco MD, Evelyne Barajas MD, Anjel Multani MD, Suzi Grover MD,  Rafa Barnes MD, Pa Corbin MD, Lisa Howell MD,  Tracy Chinchilla DO, Vincent Vega MD, Liam Yañez MD, Chilango Felix DO, Candi Valderrama MD,  Chase Wiggins DO, Tsering Lira MD, Cookie Hill MD, Guanaco Sanchez MD,  Tra Medel MD, Mckenna Rowe MD, Nickolas Yates MD, David Barlow MD, Tor Bowie MD, Patricia Alvarenga MD, Mj Lema, DO, Lali Moss MD, Vincent Washington DO, Jair Mack MD, Tracy Rome MD, Mohsin Reza, MD, Rohan Leal MD, Shirley Waterhouse, CNP,  Valentina Linda, CNP, Mj Owens, CNP,  Lis Patino, ZAC, Lilo Devine, CNP, Carmen Ramirez, CNP, Sandra Weinberg, CNP, Aria Sprague, CNP, Christy Bright, PA-C, Radha Hamlin, CNP, Chato Ward, CNP,  Mandi Keys, CNP, Yady Pottre, CNP, Kal Lundberg, PA-C, Anika Umanzor, PA-C, Angelica Brooks, CNP,        Jody Kasper, CNS, Jess Avalos, CNP, Meche Thomas, CNP         [facility]   IN-PATIENT SERVICE   [facility]    HISTORY AND PHYSICAL EXAMINATION            Date:   6/27/2025  Patient name:  Chantell Yañez  Date of admission:  6/27/2025 11:45 AM  MRN:   [id number]  Account:  [id number]  YOB: 1945  PCP:    Nisha Covarrubias APRN - CNP  Room:   41/41  Code Status:    Full Code    Chief Complaint:     Chief Complaint   Patient presents with    Shortness of Breath     C/o SOB, recent outpatient cardioversion yesterday for a-fib, sent home, pt. Reports taking additional lasix without improvement         History Obtained From:     patient, electronic medical record    History of Present Illness:     80-year-old female with past medical history of atrial fibrillation status post cardioversion 6/26/25, COPD presented to the emergency department endorsing shortness of

## 2025-06-27 NOTE — ED NOTES
Pt. Arrives to ED via [facility] from home for c/o difficulty breathing x1 day.  Pt. Had OP cardioversion done yesterday and sent home.  Pt was in a-fib with failed cardioversion last month.  Pt. Reports she took an additional lasix, contacted cardiology then was advised to call 9-1-1.  On arrival pt. Was tachypneic, labored, tripod, started on 2LNC titrated.

## 2025-06-27 NOTE — ED NOTES
ED to inpatient nurses report    Chief Complaint   Patient presents with    Shortness of Breath     C/o SOB, recent outpatient cardioversion yesterday for a-fib, sent home, pt. Reports taking additional lasix without improvement        Present to ED from home  LOC: alert and orientated to name, place, date  Vital signs   Vitals:    06/27/25 1157 06/27/25 1212 06/27/25 1227 06/27/25 1242   BP: (!) 145/71 110/77 121/81 95/81   Pulse: 59 55 56 54   Resp: 21 20 20 22   Temp:       TempSrc:       SpO2: 99% 96% 97% 96%      Oxygen Baseline none    Current needs required 3LNC   LDAs:   Peripheral IV 06/27/25 Left;Proximal Forearm (Active)   Site Assessment Clean, dry & intact 06/27/25 1150   Line Status Blood return noted;Specimen collected;Normal saline locked;Flushed 06/27/25 1150   Phlebitis Assessment No symptoms 06/27/25 1150   Infiltration Assessment 0 06/27/25 1150     Mobility: Independent  Pending ED orders: none  Present condition: stable  Code Status: [unfilled]   Consults:  [x]  Hospitalist  Completed  [x] yes [] no  []  Medicine  Completed  [] yes [] No  []  Cardiology  Completed  [] yes [] No  []  GI   Completed  [] yes [] No  []  Neurology  Completed  [] yes [] No  []  Nephrology Completed  [] yes [] No  []  Vascular  Completed  [] yes [] No   []  Surgery  Completed  [] yes [] No   []  Urology  Completed  [] yes [] No   []  Plastics  Completed  [] yes [] No   []  ENT  Completed  [] yes [] No   []  Other   Completed  [] yes [] No  Pertinent event(s)   Pertinent event(s)   Electronically signed by Chase Ewing RN on 6/27/2025 at 4:31 PM     Writer left message with PCP office with request in assisting us in ordering an echo at the end of Dec.  They will message provider and get back to us.    Patient reports BMD is scheduled with rheumatology in Jan and optometry appts scheduled as she is back on Plaquenil.

## 2025-06-27 NOTE — ED PROVIDER NOTES
[facility]     Emergency Department     Faculty Attestation    I performed a history and physical examination of the patient and discussed management with the resident. I have reviewed and agree with the resident’s findings including all diagnostic interpretations, and treatment plans as written at the time of my review. Any areas of disagreement are noted on the chart. I was personally present for the key portions of any procedures. I have documented in the chart those procedures where I was not present during the key portions. For Physician Assistant/ Nurse Practitioner cases/documentation I have personally evaluated this patient and have completed at least one if not all key elements of the E/M (history, physical exam, and MDM). Additional findings are as noted.    PtName: Chantell Yañez  MRN: [id number]  Birthdate 1945  Date of evaluation: 6/27/25  Note Started: 11:56 AM EDT    Primary Care Physician: Nisha Covarrubias, JUDAH - CNP    Brief HPI:  80-year-old female presents emergency department with shortness of breath and abdominal pain.  The patient is day 1 status post cardioversion for A-fib.  She is currently on anticoagulation.  She reports progressively worsening shortness of breath throughout the night.  She called 911 today.  The patient was hypoxic in the high 80s placed on 2 L of oxygen via nasal cannula.  Upon EMS performing abdominal exam she reports right upper quadrant abdominal pain.  Prior history of appendectomy.    Pertinent Physical Exam Findings:  Vitals:    06/27/25 1156   BP:    Pulse:    Resp:    Temp: 98 °F (36.7 °C)   SpO2:    Appears uncomfortable, tachypneic, decreased lung sounds bilateral, regular rate and rhythm, right upper quadrant abdominal tenderness, no significant lower extremity edema.    Medical Decision Making: Patient is a 80 y.o. female presenting to the emergency department with shortness of breath and abdominal pain. 
        [facility]  Emergency Department  Emergency Medicine Sign-out     Care of Chantell Yañez was assumed from Dr. Rutherford and is being seen for Shortness of Breath (C/o SOB, recent outpatient cardioversion yesterday for a-fib, sent home, pt. Reports taking additional lasix without improvement/)  .  The patient's initial evaluation and plan have been discussed with the prior attending who initially evaluated the patient.     Note start time: 1617    EMERGENCY DEPARTMENT COURSE / MEDICAL DECISION MAKING:       MEDICATIONS GIVEN:  Orders Placed This Encounter   Medications    albuterol (PROVENTIL) (2.5 MG/3ML) 0.083% nebulizer solution 2.5 mg     Initiate RT Bronchodilator Protocol:   Yes - Inpatient Protocol    iopamidol (ISOVUE-370) 76 % injection 75 mL    furosemide (LASIX) injection 40 mg    sodium chloride flush 0.9 % injection 5-40 mL    sodium chloride flush 0.9 % injection 5-40 mL    0.9 % sodium chloride infusion    OR Linked Order Group     potassium chloride (KLOR-CON M) extended release tablet 40 mEq     potassium bicarb-citric acid (EFFER-K) effervescent tablet 40 mEq     potassium chloride 10 mEq/100 mL IVPB (Peripheral Line)    magnesium sulfate 2000 mg in 50 mL IVPB premix    OR Linked Order Group     ondansetron (ZOFRAN-ODT) disintegrating tablet 4 mg     ondansetron (ZOFRAN) injection 4 mg    magnesium hydroxide (MILK OF MAGNESIA) 400 MG/5ML suspension 30 mL    OR Linked Order Group     acetaminophen (TYLENOL) tablet 650 mg     acetaminophen (TYLENOL) suppository 650 mg    amiodarone (CORDARONE) tablet 200 mg    apixaban (ELIQUIS) tablet 5 mg     Indication of Use:   A Fib/A Flutter    budesonide-formoterol (SYMBICORT) 160-4.5 MCG/ACT inhaler 2 puff    levETIRAcetam (KEPPRA) tablet 750 mg    atorvastatin (LIPITOR) tablet 20 mg    pantoprazole (PROTONIX) tablet 40 mg    metoprolol tartrate (LOPRESSOR) tablet 50 mg    albuterol sulfate HFA (PROVENTIL;VENTOLIN;PROAIR) 108 (90 Base) MCG/ACT 
     [facility] EMERGENCY DEPARTMENT  Emergency Department Encounter  Emergency Medicine Resident     Pt Name:Chantell Yañez  MRN: [id number]  Birthdate 1945  Date of evaluation: 6/27/25  PCP:  Nisha Covarrubias APRN - CNP  Note Started: 11:58 AM EDT      CHIEF COMPLAINT       Chief Complaint   Patient presents with    Shortness of Breath     C/o SOB, recent outpatient cardioversion yesterday for a-fib, sent home, pt. Reports taking additional lasix without improvement         HISTORY OF PRESENT ILLNESS  (Location/Symptom, Timing/Onset, Context/Setting, Quality, Duration, Modifying Factors, Severity.)      Chantell Yañez is a 80 y.o. female with history of persistent atrial fibrillation, underwent synchronized cardioversion yesterday with cardiology with successful conversion to normal sinus rhythm per cardiology note, AAA, HTN, HLD presents with 1 day history of shortness of breath.  Patient states since undergoing cardioversion yesterday she has had persistent and worsening shortness of breath.  She was unable to sleep due to this.  She has no associated cough or chest pain.  It is worsened by exertion.  She has increased her dose of Lasix with no/minimal relief of her symptoms.  There is no reported syncope, lightheadedness/dizziness, peripheral edema.    In triage her oxygen saturation was 86%, requiring 2 L/min nasal cannula.    PAST MEDICAL / SURGICAL / SOCIAL / FAMILY HISTORY      has a past medical history of Aneurysm of abdominal aorta, Atrial fibrillation (HCC), Cancer (HCC), ETD (eustachian tube dysfunction), Facial asymmetries, Fracture, ankle, Gastric ulcer, Headache, History of radiation therapy, Hx of cardiac cath, Hx of gastroenteritis, Hyperlipidemia, Hypertension, IBS (irritable bowel syndrome), Jaw pain, non-TMJ, Mammogram abnormal, Mitral valve prolapse syndrome, Pregnancy, incidental, and Seizures (HCC).       has a past surgical history that includes Colonoscopy (Complete Colonoscopy 
Note Started: 12:04 PM EDT  I did not see, evaluate, or staff this patient.  Patient seen by Dr. Rutherford.        Chilango Worthington MD  06/27/25 8825    
Strong peripheral pulses

## 2025-06-27 NOTE — ED NOTES
ED to inpatient nurses report    Chief Complaint   Patient presents with    Shortness of Breath     C/o SOB, recent outpatient cardioversion yesterday for a-fib, sent home, pt. Reports taking additional lasix without improvement        Present to ED from home  LOC: alert and orientated to name, place, date  Vital signs   Vitals:    06/27/25 1212 06/27/25 1227 06/27/25 1242 06/27/25 1600   BP: 110/77 121/81 95/81 121/63   Pulse: 55 56 54 56   Resp: 20 20 22 17   Temp:       TempSrc:       SpO2: 96% 97% 96% 95%      Oxygen Baseline 0    Current needs required 3L   LDAs:   Peripheral IV 06/27/25 Left;Proximal Forearm (Active)   Site Assessment Clean, dry & intact 06/27/25 1150   Line Status Blood return noted;Specimen collected;Normal saline locked;Flushed 06/27/25 1150   Phlebitis Assessment No symptoms 06/27/25 1150   Infiltration Assessment 0 06/27/25 1150     Mobility: Independent  Pending ED orders: none  Present condition: Stable  Code Status: full   Consults:  [x]  Hospitalist  Completed  [x] yes [] no  []  Medicine  Completed  [] yes [] No  []  Cardiology  Completed  [] yes [] No  []  GI   Completed  [] yes [] No  []  Neurology  Completed  [] yes [] No  []  Nephrology Completed  [] yes [] No  []  Vascular  Completed  [] yes [] No   []  Surgery  Completed  [] yes [] No   []  Urology  Completed  [] yes [] No   []  Plastics  Completed  [] yes [] No   []  ENT  Completed  [] yes [] No   []  Other   Completed  [] yes [] No  Pertinent event(s)   Pertinent event(s) shortness of breath and abdominal pain. The patient is day 1 status post cardioversion for A-fib. She is currently on anticoagulation. She reports progressively worsening shortness of breath throughout the night.   Electronically signed by Jonatan Rivera RN on 6/27/2025 at 6:42 PM

## 2025-06-28 ENCOUNTER — APPOINTMENT (OUTPATIENT)
Dept: GENERAL RADIOLOGY | Age: 80
DRG: 291 | End: 2025-06-28
Payer: COMMERCIAL

## 2025-06-28 PROBLEM — E87.6 HYPOKALEMIA: Status: ACTIVE | Noted: 2025-06-28

## 2025-06-28 PROBLEM — R06.02 SHORTNESS OF BREATH: Status: ACTIVE | Noted: 2025-06-28

## 2025-06-28 PROBLEM — G40.909 SEIZURE DISORDER (HCC): Status: ACTIVE | Noted: 2025-06-28

## 2025-06-28 LAB
ANION GAP SERPL CALCULATED.3IONS-SCNC: 15 MMOL/L (ref 9–16)
BASOPHILS # BLD: 0.06 K/UL (ref 0–0.2)
BASOPHILS NFR BLD: 1 % (ref 0–2)
BODY TEMPERATURE: 37
BUN SERPL-MCNC: 10 MG/DL (ref 8–23)
CALCIUM SERPL-MCNC: 8.7 MG/DL (ref 8.6–10.4)
CHLORIDE SERPL-SCNC: 98 MMOL/L (ref 98–107)
CO2 SERPL-SCNC: 24 MMOL/L (ref 20–31)
COHGB MFR BLD: 0.3 % (ref 0–5)
CREAT SERPL-MCNC: 0.9 MG/DL (ref 0.6–0.9)
EKG ATRIAL RATE: 63 BPM
EKG P-R INTERVAL: 184 MS
EKG Q-T INTERVAL: 426 MS
EKG QRS DURATION: 98 MS
EKG QTC CALCULATION (BAZETT): 435 MS
EKG R AXIS: 116 DEGREES
EKG T AXIS: -3 DEGREES
EKG VENTRICULAR RATE: 63 BPM
EOSINOPHIL # BLD: 0.13 K/UL (ref 0–0.44)
EOSINOPHILS RELATIVE PERCENT: 1 % (ref 1–4)
ERYTHROCYTE [DISTWIDTH] IN BLOOD BY AUTOMATED COUNT: 14.3 % (ref 11.8–14.4)
FIO2 ON VENT: ABNORMAL %
GFR, ESTIMATED: 64 ML/MIN/1.73M2
GLUCOSE SERPL-MCNC: 146 MG/DL (ref 74–99)
HCO3 VENOUS: 32.4 MMOL/L (ref 24–30)
HCT VFR BLD AUTO: 31.9 % (ref 36.3–47.1)
HGB BLD-MCNC: 10.1 G/DL (ref 11.9–15.1)
IMM GRANULOCYTES # BLD AUTO: 0.03 K/UL (ref 0–0.3)
IMM GRANULOCYTES NFR BLD: 0 %
LYMPHOCYTES NFR BLD: 1.05 K/UL (ref 1.1–3.7)
LYMPHOCYTES RELATIVE PERCENT: 11 % (ref 24–43)
MAGNESIUM SERPL-MCNC: 1.6 MG/DL (ref 1.6–2.4)
MCH RBC QN AUTO: 29.5 PG (ref 25.2–33.5)
MCHC RBC AUTO-ENTMCNC: 31.7 G/DL (ref 28.4–34.8)
MCV RBC AUTO: 93.3 FL (ref 82.6–102.9)
MONOCYTES NFR BLD: 0.73 K/UL (ref 0.1–1.2)
MONOCYTES NFR BLD: 8 % (ref 3–12)
NEUTROPHILS NFR BLD: 79 % (ref 36–65)
NEUTS SEG NFR BLD: 7.63 K/UL (ref 1.5–8.1)
NRBC BLD-RTO: 0 PER 100 WBC
O2 SAT, VEN: 30.6 % (ref 60–85)
PCO2 VENOUS: 58.3 MM HG (ref 39–55)
PH VENOUS: 7.36 (ref 7.32–7.42)
PLATELET # BLD AUTO: 166 K/UL (ref 138–453)
PMV BLD AUTO: 9.8 FL (ref 8.1–13.5)
PO2 VENOUS: 20.5 MM HG (ref 30–50)
POSITIVE BASE EXCESS, VEN: 5.6 MMOL/L (ref 0–2)
POTASSIUM SERPL-SCNC: 3.2 MMOL/L (ref 3.7–5.3)
RBC # BLD AUTO: 3.42 M/UL (ref 3.95–5.11)
SODIUM SERPL-SCNC: 137 MMOL/L (ref 136–145)
WBC OTHER # BLD: 9.6 K/UL (ref 3.5–11.3)

## 2025-06-28 PROCEDURE — 2500000003 HC RX 250 WO HCPCS: Performed by: STUDENT IN AN ORGANIZED HEALTH CARE EDUCATION/TRAINING PROGRAM

## 2025-06-28 PROCEDURE — 36600 WITHDRAWAL OF ARTERIAL BLOOD: CPT

## 2025-06-28 PROCEDURE — 99233 SBSQ HOSP IP/OBS HIGH 50: CPT | Performed by: NURSE PRACTITIONER

## 2025-06-28 PROCEDURE — 85025 COMPLETE CBC W/AUTO DIFF WBC: CPT

## 2025-06-28 PROCEDURE — 80048 BASIC METABOLIC PNL TOTAL CA: CPT

## 2025-06-28 PROCEDURE — 94760 N-INVAS EAR/PLS OXIMETRY 1: CPT

## 2025-06-28 PROCEDURE — 6370000000 HC RX 637 (ALT 250 FOR IP): Performed by: STUDENT IN AN ORGANIZED HEALTH CARE EDUCATION/TRAINING PROGRAM

## 2025-06-28 PROCEDURE — 2700000000 HC OXYGEN THERAPY PER DAY

## 2025-06-28 PROCEDURE — 93010 ELECTROCARDIOGRAM REPORT: CPT | Performed by: INTERNAL MEDICINE

## 2025-06-28 PROCEDURE — 6370000000 HC RX 637 (ALT 250 FOR IP)

## 2025-06-28 PROCEDURE — 6360000002 HC RX W HCPCS: Performed by: STUDENT IN AN ORGANIZED HEALTH CARE EDUCATION/TRAINING PROGRAM

## 2025-06-28 PROCEDURE — 94640 AIRWAY INHALATION TREATMENT: CPT

## 2025-06-28 PROCEDURE — 1200000000 HC SEMI PRIVATE

## 2025-06-28 PROCEDURE — 83735 ASSAY OF MAGNESIUM: CPT

## 2025-06-28 PROCEDURE — 6360000002 HC RX W HCPCS

## 2025-06-28 PROCEDURE — 71045 X-RAY EXAM CHEST 1 VIEW: CPT

## 2025-06-28 PROCEDURE — 82805 BLOOD GASES W/O2 SATURATION: CPT

## 2025-06-28 PROCEDURE — 36415 COLL VENOUS BLD VENIPUNCTURE: CPT

## 2025-06-28 PROCEDURE — 6360000002 HC RX W HCPCS: Performed by: NURSE PRACTITIONER

## 2025-06-28 PROCEDURE — 99232 SBSQ HOSP IP/OBS MODERATE 35: CPT | Performed by: NURSE PRACTITIONER

## 2025-06-28 RX ORDER — FUROSEMIDE 10 MG/ML
20 INJECTION INTRAMUSCULAR; INTRAVENOUS ONCE
Status: COMPLETED | OUTPATIENT
Start: 2025-06-28 | End: 2025-06-28

## 2025-06-28 RX ORDER — IPRATROPIUM BROMIDE AND ALBUTEROL SULFATE 2.5; .5 MG/3ML; MG/3ML
1 SOLUTION RESPIRATORY (INHALATION)
Status: DISCONTINUED | OUTPATIENT
Start: 2025-06-28 | End: 2025-06-29

## 2025-06-28 RX ORDER — FUROSEMIDE 10 MG/ML
20 INJECTION INTRAMUSCULAR; INTRAVENOUS 2 TIMES DAILY
Status: DISCONTINUED | OUTPATIENT
Start: 2025-06-28 | End: 2025-06-30

## 2025-06-28 RX ADMIN — LEVETIRACETAM 750 MG: 500 TABLET, FILM COATED ORAL at 19:57

## 2025-06-28 RX ADMIN — LEVETIRACETAM 750 MG: 500 TABLET, FILM COATED ORAL at 07:59

## 2025-06-28 RX ADMIN — APIXABAN 5 MG: 5 TABLET, FILM COATED ORAL at 19:57

## 2025-06-28 RX ADMIN — ALBUTEROL SULFATE 2 PUFF: 90 AEROSOL, METERED RESPIRATORY (INHALATION) at 08:19

## 2025-06-28 RX ADMIN — AMIODARONE HYDROCHLORIDE 200 MG: 200 TABLET ORAL at 07:59

## 2025-06-28 RX ADMIN — IPRATROPIUM BROMIDE AND ALBUTEROL SULFATE 1 DOSE: .5; 2.5 SOLUTION RESPIRATORY (INHALATION) at 20:11

## 2025-06-28 RX ADMIN — APIXABAN 5 MG: 5 TABLET, FILM COATED ORAL at 07:59

## 2025-06-28 RX ADMIN — ALBUTEROL SULFATE 2.5 MG: 2.5 SOLUTION RESPIRATORY (INHALATION) at 15:35

## 2025-06-28 RX ADMIN — SODIUM CHLORIDE, PRESERVATIVE FREE 10 ML: 5 INJECTION INTRAVENOUS at 08:00

## 2025-06-28 RX ADMIN — SODIUM CHLORIDE, PRESERVATIVE FREE 10 ML: 5 INJECTION INTRAVENOUS at 19:58

## 2025-06-28 RX ADMIN — FUROSEMIDE 20 MG: 10 INJECTION, SOLUTION INTRAMUSCULAR; INTRAVENOUS at 09:57

## 2025-06-28 RX ADMIN — FUROSEMIDE 20 MG: 10 INJECTION, SOLUTION INTRAMUSCULAR; INTRAVENOUS at 17:47

## 2025-06-28 RX ADMIN — ATORVASTATIN CALCIUM 20 MG: 20 TABLET, FILM COATED ORAL at 07:59

## 2025-06-28 RX ADMIN — PANTOPRAZOLE SODIUM 40 MG: 40 TABLET, DELAYED RELEASE ORAL at 05:05

## 2025-06-28 RX ADMIN — BUDESONIDE AND FORMOTEROL FUMARATE DIHYDRATE 2 PUFF: 160; 4.5 AEROSOL RESPIRATORY (INHALATION) at 08:19

## 2025-06-28 RX ADMIN — BUDESONIDE AND FORMOTEROL FUMARATE DIHYDRATE 2 PUFF: 160; 4.5 AEROSOL RESPIRATORY (INHALATION) at 20:11

## 2025-06-28 NOTE — PLAN OF CARE
Problem: Respiratory - Adult  Goal: Achieves optimal ventilation and oxygenation  6/28/2025 0242 by Ben Watters, RN  Outcome: Progressing  6/28/2025 0241 by Ben Watters RN  Outcome: Progressing  6/27/2025 2059 by Trenton Hightower, PHILOMENA  Outcome: Progressing     Problem: Safety - Adult  Goal: Free from fall injury  Outcome: Progressing

## 2025-06-29 LAB
ANION GAP SERPL CALCULATED.3IONS-SCNC: 15 MMOL/L (ref 9–16)
ANION GAP SERPL CALCULATED.3IONS-SCNC: 16 MMOL/L (ref 9–16)
ANTI-XA UNFRAC HEPARIN: >2 IU/L
BASOPHILS # BLD: 0.07 K/UL (ref 0–0.2)
BASOPHILS NFR BLD: 1 % (ref 0–2)
BNP SERPL-MCNC: 2971 PG/ML (ref 0–450)
BUN SERPL-MCNC: 10 MG/DL (ref 8–23)
BUN SERPL-MCNC: 11 MG/DL (ref 8–23)
CALCIUM SERPL-MCNC: 8.5 MG/DL (ref 8.6–10.4)
CALCIUM SERPL-MCNC: 8.6 MG/DL (ref 8.6–10.4)
CHLORIDE SERPL-SCNC: 98 MMOL/L (ref 98–107)
CHLORIDE SERPL-SCNC: 98 MMOL/L (ref 98–107)
CO2 SERPL-SCNC: 25 MMOL/L (ref 20–31)
CO2 SERPL-SCNC: 27 MMOL/L (ref 20–31)
CREAT SERPL-MCNC: 0.7 MG/DL (ref 0.6–0.9)
CREAT SERPL-MCNC: 0.7 MG/DL (ref 0.6–0.9)
EOSINOPHIL # BLD: 0.14 K/UL (ref 0–0.44)
EOSINOPHILS RELATIVE PERCENT: 2 % (ref 1–4)
ERYTHROCYTE [DISTWIDTH] IN BLOOD BY AUTOMATED COUNT: 14.5 % (ref 11.8–14.4)
ERYTHROCYTE [DISTWIDTH] IN BLOOD BY AUTOMATED COUNT: 14.6 % (ref 11.8–14.4)
GFR, ESTIMATED: 86 ML/MIN/1.73M2
GFR, ESTIMATED: 86 ML/MIN/1.73M2
GLUCOSE SERPL-MCNC: 105 MG/DL (ref 74–99)
GLUCOSE SERPL-MCNC: 139 MG/DL (ref 74–99)
HCT VFR BLD AUTO: 31.5 % (ref 36.3–47.1)
HCT VFR BLD AUTO: 33 % (ref 36.3–47.1)
HGB BLD-MCNC: 10.4 G/DL (ref 11.9–15.1)
HGB BLD-MCNC: 9.8 G/DL (ref 11.9–15.1)
IMM GRANULOCYTES # BLD AUTO: <0.03 K/UL (ref 0–0.3)
IMM GRANULOCYTES NFR BLD: 0 %
INR PPP: 1.5
LYMPHOCYTES NFR BLD: 0.9 K/UL (ref 1.1–3.7)
LYMPHOCYTES RELATIVE PERCENT: 10 % (ref 24–43)
MAGNESIUM SERPL-MCNC: 1.6 MG/DL (ref 1.6–2.4)
MAGNESIUM SERPL-MCNC: 1.9 MG/DL (ref 1.6–2.4)
MCH RBC QN AUTO: 29.4 PG (ref 25.2–33.5)
MCH RBC QN AUTO: 29.5 PG (ref 25.2–33.5)
MCHC RBC AUTO-ENTMCNC: 31.1 G/DL (ref 28.4–34.8)
MCHC RBC AUTO-ENTMCNC: 31.5 G/DL (ref 28.4–34.8)
MCV RBC AUTO: 93.2 FL (ref 82.6–102.9)
MCV RBC AUTO: 94.9 FL (ref 82.6–102.9)
MONOCYTES NFR BLD: 0.89 K/UL (ref 0.1–1.2)
MONOCYTES NFR BLD: 10 % (ref 3–12)
NEUTROPHILS NFR BLD: 77 % (ref 36–65)
NEUTS SEG NFR BLD: 7.08 K/UL (ref 1.5–8.1)
NRBC BLD-RTO: 0 PER 100 WBC
NRBC BLD-RTO: 0 PER 100 WBC
PARTIAL THROMBOPLASTIN TIME: 34.6 SEC (ref 23–36.5)
PARTIAL THROMBOPLASTIN TIME: 47 SEC (ref 23–36.5)
PLATELET # BLD AUTO: 150 K/UL (ref 138–453)
PLATELET # BLD AUTO: 164 K/UL (ref 138–453)
PMV BLD AUTO: 10.5 FL (ref 8.1–13.5)
PMV BLD AUTO: 9.7 FL (ref 8.1–13.5)
POTASSIUM SERPL-SCNC: 3.4 MMOL/L (ref 3.7–5.3)
POTASSIUM SERPL-SCNC: 3.5 MMOL/L (ref 3.7–5.3)
PROCALCITONIN SERPL-MCNC: 0.04 NG/ML (ref 0–0.09)
PROTHROMBIN TIME: 18.5 SEC (ref 11.7–14.9)
RBC # BLD AUTO: 3.32 M/UL (ref 3.95–5.11)
RBC # BLD AUTO: 3.54 M/UL (ref 3.95–5.11)
RBC # BLD: ABNORMAL 10*6/UL
SODIUM SERPL-SCNC: 139 MMOL/L (ref 136–145)
SODIUM SERPL-SCNC: 140 MMOL/L (ref 136–145)
TROPONIN I SERPL HS-MCNC: 10 NG/L (ref 0–14)
WBC OTHER # BLD: 9.1 K/UL (ref 3.5–11.3)
WBC OTHER # BLD: 9.7 K/UL (ref 3.5–11.3)

## 2025-06-29 PROCEDURE — 80048 BASIC METABOLIC PNL TOTAL CA: CPT

## 2025-06-29 PROCEDURE — 84484 ASSAY OF TROPONIN QUANT: CPT

## 2025-06-29 PROCEDURE — 36415 COLL VENOUS BLD VENIPUNCTURE: CPT

## 2025-06-29 PROCEDURE — 2060000000 HC ICU INTERMEDIATE R&B

## 2025-06-29 PROCEDURE — 2700000000 HC OXYGEN THERAPY PER DAY

## 2025-06-29 PROCEDURE — 93005 ELECTROCARDIOGRAM TRACING: CPT | Performed by: STUDENT IN AN ORGANIZED HEALTH CARE EDUCATION/TRAINING PROGRAM

## 2025-06-29 PROCEDURE — 2500000003 HC RX 250 WO HCPCS: Performed by: STUDENT IN AN ORGANIZED HEALTH CARE EDUCATION/TRAINING PROGRAM

## 2025-06-29 PROCEDURE — 83735 ASSAY OF MAGNESIUM: CPT

## 2025-06-29 PROCEDURE — 6360000002 HC RX W HCPCS: Performed by: STUDENT IN AN ORGANIZED HEALTH CARE EDUCATION/TRAINING PROGRAM

## 2025-06-29 PROCEDURE — 85027 COMPLETE CBC AUTOMATED: CPT

## 2025-06-29 PROCEDURE — 94664 DEMO&/EVAL PT USE INHALER: CPT

## 2025-06-29 PROCEDURE — 6360000002 HC RX W HCPCS: Performed by: NURSE PRACTITIONER

## 2025-06-29 PROCEDURE — 6370000000 HC RX 637 (ALT 250 FOR IP): Performed by: STUDENT IN AN ORGANIZED HEALTH CARE EDUCATION/TRAINING PROGRAM

## 2025-06-29 PROCEDURE — 85730 THROMBOPLASTIN TIME PARTIAL: CPT

## 2025-06-29 PROCEDURE — 94640 AIRWAY INHALATION TREATMENT: CPT

## 2025-06-29 PROCEDURE — 94761 N-INVAS EAR/PLS OXIMETRY MLT: CPT

## 2025-06-29 PROCEDURE — 83880 ASSAY OF NATRIURETIC PEPTIDE: CPT

## 2025-06-29 PROCEDURE — 85520 HEPARIN ASSAY: CPT

## 2025-06-29 PROCEDURE — 99232 SBSQ HOSP IP/OBS MODERATE 35: CPT | Performed by: NURSE PRACTITIONER

## 2025-06-29 PROCEDURE — 85610 PROTHROMBIN TIME: CPT

## 2025-06-29 PROCEDURE — 6370000000 HC RX 637 (ALT 250 FOR IP): Performed by: NURSE PRACTITIONER

## 2025-06-29 PROCEDURE — 85025 COMPLETE CBC W/AUTO DIFF WBC: CPT

## 2025-06-29 PROCEDURE — 84145 PROCALCITONIN (PCT): CPT

## 2025-06-29 PROCEDURE — 99233 SBSQ HOSP IP/OBS HIGH 50: CPT | Performed by: NURSE PRACTITIONER

## 2025-06-29 RX ORDER — HEPARIN SODIUM 1000 [USP'U]/ML
4000 INJECTION, SOLUTION INTRAVENOUS; SUBCUTANEOUS PRN
Status: DISCONTINUED | OUTPATIENT
Start: 2025-06-29 | End: 2025-06-29 | Stop reason: ALTCHOICE

## 2025-06-29 RX ORDER — MAGNESIUM SULFATE IN WATER 40 MG/ML
2000 INJECTION, SOLUTION INTRAVENOUS ONCE
Status: COMPLETED | OUTPATIENT
Start: 2025-06-29 | End: 2025-06-29

## 2025-06-29 RX ORDER — LEVALBUTEROL INHALATION SOLUTION 1.25 MG/3ML
1.25 SOLUTION RESPIRATORY (INHALATION) EVERY 4 HOURS PRN
Status: DISCONTINUED | OUTPATIENT
Start: 2025-06-29 | End: 2025-07-01 | Stop reason: HOSPADM

## 2025-06-29 RX ORDER — POTASSIUM CHLORIDE 7.45 MG/ML
10 INJECTION INTRAVENOUS
Status: COMPLETED | OUTPATIENT
Start: 2025-06-29 | End: 2025-06-29

## 2025-06-29 RX ORDER — HEPARIN SODIUM 1000 [USP'U]/ML
4000 INJECTION, SOLUTION INTRAVENOUS; SUBCUTANEOUS ONCE
Status: DISCONTINUED | OUTPATIENT
Start: 2025-06-29 | End: 2025-06-29 | Stop reason: ALTCHOICE

## 2025-06-29 RX ORDER — METOPROLOL TARTRATE 1 MG/ML
2.5 INJECTION, SOLUTION INTRAVENOUS EVERY 6 HOURS PRN
Status: DISCONTINUED | OUTPATIENT
Start: 2025-06-29 | End: 2025-07-01 | Stop reason: HOSPADM

## 2025-06-29 RX ORDER — HEPARIN SODIUM 1000 [USP'U]/ML
2000 INJECTION, SOLUTION INTRAVENOUS; SUBCUTANEOUS PRN
Status: DISCONTINUED | OUTPATIENT
Start: 2025-06-29 | End: 2025-06-29 | Stop reason: ALTCHOICE

## 2025-06-29 RX ORDER — HEPARIN SODIUM 1000 [USP'U]/ML
4000 INJECTION, SOLUTION INTRAVENOUS; SUBCUTANEOUS ONCE
Status: DISCONTINUED | OUTPATIENT
Start: 2025-06-29 | End: 2025-06-29

## 2025-06-29 RX ORDER — HEPARIN SODIUM 10000 [USP'U]/100ML
5-30 INJECTION, SOLUTION INTRAVENOUS CONTINUOUS
Status: DISCONTINUED | OUTPATIENT
Start: 2025-06-29 | End: 2025-07-01 | Stop reason: HOSPADM

## 2025-06-29 RX ORDER — IPRATROPIUM BROMIDE AND ALBUTEROL SULFATE 2.5; .5 MG/3ML; MG/3ML
1 SOLUTION RESPIRATORY (INHALATION) EVERY 6 HOURS PRN
Status: DISCONTINUED | OUTPATIENT
Start: 2025-06-29 | End: 2025-07-01 | Stop reason: HOSPADM

## 2025-06-29 RX ORDER — HEPARIN SODIUM 1000 [USP'U]/ML
4000 INJECTION, SOLUTION INTRAVENOUS; SUBCUTANEOUS PRN
Status: DISCONTINUED | OUTPATIENT
Start: 2025-06-29 | End: 2025-07-01 | Stop reason: HOSPADM

## 2025-06-29 RX ORDER — HEPARIN SODIUM 1000 [USP'U]/ML
2000 INJECTION, SOLUTION INTRAVENOUS; SUBCUTANEOUS PRN
Status: DISCONTINUED | OUTPATIENT
Start: 2025-06-29 | End: 2025-07-01 | Stop reason: HOSPADM

## 2025-06-29 RX ORDER — METOPROLOL TARTRATE 25 MG/1
25 TABLET, FILM COATED ORAL 2 TIMES DAILY
Status: DISCONTINUED | OUTPATIENT
Start: 2025-06-29 | End: 2025-06-30

## 2025-06-29 RX ORDER — HEPARIN SODIUM 10000 [USP'U]/100ML
5-30 INJECTION, SOLUTION INTRAVENOUS CONTINUOUS
Status: DISCONTINUED | OUTPATIENT
Start: 2025-06-29 | End: 2025-06-29 | Stop reason: ALTCHOICE

## 2025-06-29 RX ORDER — ALPRAZOLAM 0.25 MG
0.25 TABLET ORAL 3 TIMES DAILY PRN
Status: DISCONTINUED | OUTPATIENT
Start: 2025-06-29 | End: 2025-07-01 | Stop reason: HOSPADM

## 2025-06-29 RX ORDER — METOPROLOL TARTRATE 1 MG/ML
5 INJECTION, SOLUTION INTRAVENOUS ONCE
Status: DISCONTINUED | OUTPATIENT
Start: 2025-06-29 | End: 2025-06-29

## 2025-06-29 RX ADMIN — PANTOPRAZOLE SODIUM 40 MG: 40 TABLET, DELAYED RELEASE ORAL at 05:19

## 2025-06-29 RX ADMIN — AMIODARONE HYDROCHLORIDE 150 MG: 1.5 INJECTION, SOLUTION INTRAVENOUS at 14:03

## 2025-06-29 RX ADMIN — POTASSIUM CHLORIDE 10 MEQ: 7.45 INJECTION INTRAVENOUS at 14:10

## 2025-06-29 RX ADMIN — AMIODARONE HYDROCHLORIDE 200 MG: 200 TABLET ORAL at 08:14

## 2025-06-29 RX ADMIN — METOPROLOL TARTRATE 2.5 MG: 5 INJECTION INTRAVENOUS at 18:32

## 2025-06-29 RX ADMIN — ATORVASTATIN CALCIUM 20 MG: 20 TABLET, FILM COATED ORAL at 08:14

## 2025-06-29 RX ADMIN — BUDESONIDE AND FORMOTEROL FUMARATE DIHYDRATE 2 PUFF: 160; 4.5 AEROSOL RESPIRATORY (INHALATION) at 21:09

## 2025-06-29 RX ADMIN — HEPARIN SODIUM 4000 UNITS: 1000 INJECTION INTRAVENOUS; SUBCUTANEOUS at 23:41

## 2025-06-29 RX ADMIN — APIXABAN 5 MG: 5 TABLET, FILM COATED ORAL at 08:14

## 2025-06-29 RX ADMIN — SODIUM CHLORIDE, PRESERVATIVE FREE 10 ML: 5 INJECTION INTRAVENOUS at 08:14

## 2025-06-29 RX ADMIN — IPRATROPIUM BROMIDE AND ALBUTEROL SULFATE 1 DOSE: .5; 2.5 SOLUTION RESPIRATORY (INHALATION) at 08:23

## 2025-06-29 RX ADMIN — MAGNESIUM SULFATE HEPTAHYDRATE 2000 MG: 40 INJECTION, SOLUTION INTRAVENOUS at 14:09

## 2025-06-29 RX ADMIN — METOPROLOL TARTRATE 25 MG: 25 TABLET, FILM COATED ORAL at 20:37

## 2025-06-29 RX ADMIN — POTASSIUM BICARBONATE 40 MEQ: 782 TABLET, EFFERVESCENT ORAL at 09:29

## 2025-06-29 RX ADMIN — SODIUM CHLORIDE, PRESERVATIVE FREE 10 ML: 5 INJECTION INTRAVENOUS at 20:39

## 2025-06-29 RX ADMIN — HEPARIN SODIUM 12 UNITS/KG/HR: 10000 INJECTION, SOLUTION INTRAVENOUS at 20:33

## 2025-06-29 RX ADMIN — AMIODARONE HYDROCHLORIDE 0.5 MG/MIN: 1.8 INJECTION, SOLUTION INTRAVENOUS at 19:59

## 2025-06-29 RX ADMIN — AMIODARONE HYDROCHLORIDE 1 MG/MIN: 1.8 INJECTION, SOLUTION INTRAVENOUS at 14:16

## 2025-06-29 RX ADMIN — BUDESONIDE AND FORMOTEROL FUMARATE DIHYDRATE 2 PUFF: 160; 4.5 AEROSOL RESPIRATORY (INHALATION) at 09:54

## 2025-06-29 RX ADMIN — LEVETIRACETAM 750 MG: 500 TABLET, FILM COATED ORAL at 08:14

## 2025-06-29 RX ADMIN — POTASSIUM CHLORIDE 10 MEQ: 7.45 INJECTION INTRAVENOUS at 15:22

## 2025-06-29 RX ADMIN — FUROSEMIDE 20 MG: 10 INJECTION, SOLUTION INTRAMUSCULAR; INTRAVENOUS at 08:14

## 2025-06-29 RX ADMIN — LEVETIRACETAM 750 MG: 500 TABLET, FILM COATED ORAL at 20:38

## 2025-06-29 NOTE — PLAN OF CARE
Problem: Respiratory - Adult  Goal: Achieves optimal ventilation and oxygenation  Outcome: Progressing     Problem: Safety - Adult  Goal: Free from fall injury  Outcome: Progressing

## 2025-06-29 NOTE — PLAN OF CARE
Problem: Respiratory - Adult  Goal: Achieves optimal ventilation and oxygenation  Outcome: Progressing  Flowsheets (Taken 6/29/2025 0823 by Denise Baires RCP)  Achieves optimal ventilation and oxygenation:   Assess for changes in respiratory status   Assess for changes in mentation and behavior   Position to facilitate oxygenation and minimize respiratory effort   Oxygen supplementation based on oxygen saturation or arterial blood gases   Encourage broncho-pulmonary hygiene including cough, deep breathe, incentive spirometry   Assess the need for suctioning and aspirate as needed   Assess and instruct to report shortness of breath or any respiratory difficulty   Respiratory therapy support as indicated     Problem: Safety - Adult  Goal: Free from fall injury  Outcome: Progressing     Problem: Discharge Planning  Goal: Discharge to home or other facility with appropriate resources  Outcome: Progressing

## 2025-06-29 NOTE — CARE COORDINATION
06/29/25 1647   Readmission Assessment   Number of Days since last admission? 1-7 days   Previous Disposition Home with Family   Who is being Interviewed Patient   What was the patient's/caregiver's perception as to why they think they needed to return back to the hospital? Did not agree to original recommended D/C plan  (feels she should have stayed overnight due to SOB)   Did you visit your Primary Care Physician after you left the hospital, before you returned this time? No   Why weren't you able to visit your PCP? Other (Comment)  (only home 1 day)   Did you see a specialist, such as Cardiac, Pulmonary, Orthopedic Physician, etc. after you left the hospital? No   Who advised the patient to return to the hospital? Self-referral   Does the patient report anything that got in the way of taking their medications? No   In our efforts to provide the best possible care to you and others like you, can you think of anything that we could have done to help you after you left the hospital the first time, so that you might not have needed to return so soon? Arrange for more help when leaving the hospital;Other (Comment)  (longer monitoring after procedure)

## 2025-06-29 NOTE — CARE COORDINATION
Case Management Assessment  Initial Evaluation    Date/Time of Evaluation: 6/29/2025 4:45 PM  Assessment Completed by: Nhung Perez RN    If patient is discharged prior to next notation, then this note serves as note for discharge by case management.    Patient Name: Chantell Yañez                   YOB: 1945  Diagnosis: Shortness of breath [R06.02]  Acute congestive heart failure, unspecified heart failure type (HCC) [I50.9]  Acute hypoxic respiratory failure (HCC) [J96.01]                   Date / Time: 6/27/2025 11:45 AM    Patient Admission Status: Inpatient   Readmission Risk (Low < 19, Mod (19-27), High > 27): Readmission Risk Score: 10.4    Current PCP: Nisha Covarrubias APRN - CNP  PCP verified by CM? (P) Yes    Chart Reviewed: Yes      History Provided by: (P) Patient  Patient Orientation: (P) Alert and Oriented    Patient Cognition: (P) Alert    Hospitalization in the last 30 days (Readmission):  Yes    If yes, Readmission Assessment in  Navigator will be completed.    Advance Directives:      Code Status: Full Code   Patient's Primary Decision Maker is: (P) Legal Next of Kin    Primary Decision Maker: Nando Yañez - Spouse - [phone]    Secondary Decision Maker: Nando Yañez Jr - Child - [phone]    Discharge Planning:    Patient lives with: (P) Family Members Type of Home: (P) House  Primary Care Giver: (P) Self  Patient Support Systems include: (P) Family Members   Current Financial resources: (P) Medicare  Current community resources: (P) None  Current services prior to admission: (P) None            Current DME:              Type of Home Care services:  (P) None    ADLS  Prior functional level: (P) Independent in ADLs/IADLs  Current functional level: (P) Independent in ADLs/IADLs    PT AM-PAC:   /24  OT AM-PAC:   /24    Family can provide assistance at DC: (P) Yes  Would you like Case Management to discuss the discharge plan with any other family members/significant others,

## 2025-06-30 ENCOUNTER — APPOINTMENT (OUTPATIENT)
Age: 80
DRG: 291 | End: 2025-06-30
Attending: STUDENT IN AN ORGANIZED HEALTH CARE EDUCATION/TRAINING PROGRAM
Payer: COMMERCIAL

## 2025-06-30 PROBLEM — I48.0 PAROXYSMAL ATRIAL FIBRILLATION (HCC): Status: RESOLVED | Noted: 2023-11-11 | Resolved: 2025-06-30

## 2025-06-30 PROBLEM — R06.02 SHORTNESS OF BREATH: Status: RESOLVED | Noted: 2025-06-28 | Resolved: 2025-06-30

## 2025-06-30 PROBLEM — I50.9 ACUTE CONGESTIVE HEART FAILURE (HCC): Status: ACTIVE | Noted: 2025-06-30

## 2025-06-30 LAB
ANION GAP SERPL CALCULATED.3IONS-SCNC: 11 MMOL/L (ref 9–16)
BASOPHILS # BLD: 0.06 K/UL (ref 0–0.2)
BASOPHILS NFR BLD: 1 % (ref 0–2)
BUN SERPL-MCNC: 8 MG/DL (ref 8–23)
CALCIUM SERPL-MCNC: 8.9 MG/DL (ref 8.6–10.4)
CHLORIDE SERPL-SCNC: 100 MMOL/L (ref 98–107)
CO2 SERPL-SCNC: 29 MMOL/L (ref 20–31)
CREAT SERPL-MCNC: 0.6 MG/DL (ref 0.6–0.9)
EKG ATRIAL RATE: 138 BPM
EKG ATRIAL RATE: 300 BPM
EKG Q-T INTERVAL: 276 MS
EKG Q-T INTERVAL: 346 MS
EKG QRS DURATION: 114 MS
EKG QRS DURATION: 122 MS
EKG QTC CALCULATION (BAZETT): 396 MS
EKG R AXIS: 16 DEGREES
EKG R AXIS: 44 DEGREES
EKG T AXIS: 220 DEGREES
EKG T AXIS: 240 DEGREES
EKG VENTRICULAR RATE: 124 BPM
EOSINOPHIL # BLD: 0.32 K/UL (ref 0–0.44)
EOSINOPHILS RELATIVE PERCENT: 4 % (ref 1–4)
ERYTHROCYTE [DISTWIDTH] IN BLOOD BY AUTOMATED COUNT: 14.5 % (ref 11.8–14.4)
GFR, ESTIMATED: 90 ML/MIN/1.73M2
GLUCOSE SERPL-MCNC: 101 MG/DL (ref 74–99)
HCT VFR BLD AUTO: 34 % (ref 36.3–47.1)
HCT VFR BLD AUTO: 34.4 % (ref 36.3–47.1)
HGB BLD-MCNC: 10.9 G/DL (ref 11.9–15.1)
HGB BLD-MCNC: 11.1 G/DL (ref 11.9–15.1)
IMM GRANULOCYTES # BLD AUTO: 0.03 K/UL (ref 0–0.3)
IMM GRANULOCYTES NFR BLD: 0 %
LYMPHOCYTES NFR BLD: 1.11 K/UL (ref 1.1–3.7)
LYMPHOCYTES RELATIVE PERCENT: 13 % (ref 24–43)
MAGNESIUM SERPL-MCNC: 1.9 MG/DL (ref 1.6–2.4)
MCH RBC QN AUTO: 29.6 PG (ref 25.2–33.5)
MCHC RBC AUTO-ENTMCNC: 32.6 G/DL (ref 28.4–34.8)
MCV RBC AUTO: 90.7 FL (ref 82.6–102.9)
MONOCYTES NFR BLD: 0.9 K/UL (ref 0.1–1.2)
MONOCYTES NFR BLD: 10 % (ref 3–12)
NEUTROPHILS NFR BLD: 72 % (ref 36–65)
NEUTS SEG NFR BLD: 6.24 K/UL (ref 1.5–8.1)
NRBC BLD-RTO: 0 PER 100 WBC
PARTIAL THROMBOPLASTIN TIME: 104.7 SEC (ref 23–36.5)
PARTIAL THROMBOPLASTIN TIME: 43.1 SEC (ref 23–36.5)
PARTIAL THROMBOPLASTIN TIME: 63.5 SEC (ref 23–36.5)
PARTIAL THROMBOPLASTIN TIME: 88.3 SEC (ref 23–36.5)
PLATELET # BLD AUTO: 175 K/UL (ref 138–453)
PMV BLD AUTO: 10 FL (ref 8.1–13.5)
POTASSIUM SERPL-SCNC: 3.6 MMOL/L (ref 3.7–5.3)
RBC # BLD AUTO: 3.75 M/UL (ref 3.95–5.11)
RBC # BLD: ABNORMAL 10*6/UL
SODIUM SERPL-SCNC: 140 MMOL/L (ref 136–145)
WBC OTHER # BLD: 8.7 K/UL (ref 3.5–11.3)

## 2025-06-30 PROCEDURE — 93010 ELECTROCARDIOGRAM REPORT: CPT | Performed by: INTERNAL MEDICINE

## 2025-06-30 PROCEDURE — 99233 SBSQ HOSP IP/OBS HIGH 50: CPT | Performed by: SURGERY

## 2025-06-30 PROCEDURE — 36415 COLL VENOUS BLD VENIPUNCTURE: CPT

## 2025-06-30 PROCEDURE — 83735 ASSAY OF MAGNESIUM: CPT

## 2025-06-30 PROCEDURE — 6370000000 HC RX 637 (ALT 250 FOR IP): Performed by: NURSE PRACTITIONER

## 2025-06-30 PROCEDURE — 97535 SELF CARE MNGMENT TRAINING: CPT

## 2025-06-30 PROCEDURE — 6360000002 HC RX W HCPCS: Performed by: STUDENT IN AN ORGANIZED HEALTH CARE EDUCATION/TRAINING PROGRAM

## 2025-06-30 PROCEDURE — 97162 PT EVAL MOD COMPLEX 30 MIN: CPT

## 2025-06-30 PROCEDURE — 85018 HEMOGLOBIN: CPT

## 2025-06-30 PROCEDURE — 85014 HEMATOCRIT: CPT

## 2025-06-30 PROCEDURE — 6370000000 HC RX 637 (ALT 250 FOR IP): Performed by: STUDENT IN AN ORGANIZED HEALTH CARE EDUCATION/TRAINING PROGRAM

## 2025-06-30 PROCEDURE — 2700000000 HC OXYGEN THERAPY PER DAY

## 2025-06-30 PROCEDURE — 2060000000 HC ICU INTERMEDIATE R&B

## 2025-06-30 PROCEDURE — 85730 THROMBOPLASTIN TIME PARTIAL: CPT

## 2025-06-30 PROCEDURE — 99232 SBSQ HOSP IP/OBS MODERATE 35: CPT | Performed by: STUDENT IN AN ORGANIZED HEALTH CARE EDUCATION/TRAINING PROGRAM

## 2025-06-30 PROCEDURE — 93306 TTE W/DOPPLER COMPLETE: CPT

## 2025-06-30 PROCEDURE — 97530 THERAPEUTIC ACTIVITIES: CPT

## 2025-06-30 PROCEDURE — 85025 COMPLETE CBC W/AUTO DIFF WBC: CPT

## 2025-06-30 PROCEDURE — 93306 TTE W/DOPPLER COMPLETE: CPT | Performed by: INTERNAL MEDICINE

## 2025-06-30 PROCEDURE — 6360000002 HC RX W HCPCS: Performed by: NURSE PRACTITIONER

## 2025-06-30 PROCEDURE — 94640 AIRWAY INHALATION TREATMENT: CPT

## 2025-06-30 PROCEDURE — 6370000000 HC RX 637 (ALT 250 FOR IP): Performed by: SURGERY

## 2025-06-30 PROCEDURE — 94761 N-INVAS EAR/PLS OXIMETRY MLT: CPT

## 2025-06-30 PROCEDURE — 97166 OT EVAL MOD COMPLEX 45 MIN: CPT

## 2025-06-30 PROCEDURE — 80048 BASIC METABOLIC PNL TOTAL CA: CPT

## 2025-06-30 PROCEDURE — 2500000003 HC RX 250 WO HCPCS: Performed by: STUDENT IN AN ORGANIZED HEALTH CARE EDUCATION/TRAINING PROGRAM

## 2025-06-30 RX ORDER — METOPROLOL TARTRATE 25 MG/1
25 TABLET, FILM COATED ORAL 3 TIMES DAILY
Status: DISCONTINUED | OUTPATIENT
Start: 2025-06-30 | End: 2025-07-01

## 2025-06-30 RX ORDER — FUROSEMIDE 20 MG/1
20 TABLET ORAL DAILY
Status: DISCONTINUED | OUTPATIENT
Start: 2025-06-30 | End: 2025-07-01 | Stop reason: HOSPADM

## 2025-06-30 RX ORDER — POTASSIUM CHLORIDE 1500 MG/1
40 TABLET, EXTENDED RELEASE ORAL ONCE
Status: COMPLETED | OUTPATIENT
Start: 2025-06-30 | End: 2025-06-30

## 2025-06-30 RX ORDER — MAGNESIUM SULFATE 1 G/100ML
1000 INJECTION INTRAVENOUS ONCE
Status: COMPLETED | OUTPATIENT
Start: 2025-06-30 | End: 2025-06-30

## 2025-06-30 RX ADMIN — AMIODARONE HYDROCHLORIDE 0.5 MG/MIN: 1.8 INJECTION, SOLUTION INTRAVENOUS at 18:31

## 2025-06-30 RX ADMIN — PANTOPRAZOLE SODIUM 40 MG: 40 TABLET, DELAYED RELEASE ORAL at 05:25

## 2025-06-30 RX ADMIN — METOPROLOL TARTRATE 25 MG: 25 TABLET, FILM COATED ORAL at 09:33

## 2025-06-30 RX ADMIN — BUDESONIDE AND FORMOTEROL FUMARATE DIHYDRATE 2 PUFF: 160; 4.5 AEROSOL RESPIRATORY (INHALATION) at 08:53

## 2025-06-30 RX ADMIN — HEPARIN SODIUM 4000 UNITS: 1000 INJECTION INTRAVENOUS; SUBCUTANEOUS at 16:06

## 2025-06-30 RX ADMIN — BUDESONIDE AND FORMOTEROL FUMARATE DIHYDRATE 2 PUFF: 160; 4.5 AEROSOL RESPIRATORY (INHALATION) at 20:05

## 2025-06-30 RX ADMIN — METOPROLOL TARTRATE 25 MG: 25 TABLET, FILM COATED ORAL at 21:45

## 2025-06-30 RX ADMIN — ATORVASTATIN CALCIUM 20 MG: 20 TABLET, FILM COATED ORAL at 09:33

## 2025-06-30 RX ADMIN — AMIODARONE HYDROCHLORIDE 0.5 MG/MIN: 1.8 INJECTION, SOLUTION INTRAVENOUS at 06:25

## 2025-06-30 RX ADMIN — LEVETIRACETAM 750 MG: 500 TABLET, FILM COATED ORAL at 21:44

## 2025-06-30 RX ADMIN — POTASSIUM CHLORIDE 40 MEQ: 1500 TABLET, EXTENDED RELEASE ORAL at 09:33

## 2025-06-30 RX ADMIN — METOPROLOL TARTRATE 25 MG: 25 TABLET, FILM COATED ORAL at 16:03

## 2025-06-30 RX ADMIN — LEVETIRACETAM 750 MG: 500 TABLET, FILM COATED ORAL at 09:33

## 2025-06-30 RX ADMIN — AMIODARONE HYDROCHLORIDE 200 MG: 200 TABLET ORAL at 09:33

## 2025-06-30 RX ADMIN — FUROSEMIDE 20 MG: 20 TABLET ORAL at 10:06

## 2025-06-30 RX ADMIN — HEPARIN SODIUM 18 UNITS/KG/HR: 10000 INJECTION, SOLUTION INTRAVENOUS at 18:27

## 2025-06-30 RX ADMIN — MAGNESIUM SULFATE HEPTAHYDRATE 1000 MG: 1 INJECTION, SOLUTION INTRAVENOUS at 09:38

## 2025-06-30 ASSESSMENT — PAIN SCALES - GENERAL: PAINLEVEL_OUTOF10: 0

## 2025-06-30 NOTE — PLAN OF CARE
Problem: Respiratory - Adult  Goal: Achieves optimal ventilation and oxygenation  6/30/2025 0604 by Nisha Almaguer RN  Outcome: Progressing  6/29/2025 2113 by Alanna العلي PHILOMENA  Outcome: Progressing  Flowsheets (Taken 6/29/2025 2000 by Nisha Almaguer RN)  Achieves optimal ventilation and oxygenation: Initiate smoking cessation protocol as indicated  6/29/2025 1654 by Monica Levine RN  Outcome: Progressing  Flowsheets (Taken 6/29/2025 0823 by Denise Baires PHILOMENA)  Achieves optimal ventilation and oxygenation:   Assess for changes in respiratory status   Assess for changes in mentation and behavior   Position to facilitate oxygenation and minimize respiratory effort   Oxygen supplementation based on oxygen saturation or arterial blood gases   Encourage broncho-pulmonary hygiene including cough, deep breathe, incentive spirometry   Assess the need for suctioning and aspirate as needed   Assess and instruct to report shortness of breath or any respiratory difficulty   Respiratory therapy support as indicated     Problem: Safety - Adult  Goal: Free from fall injury  6/30/2025 0604 by Nisha Almaguer RN  Outcome: Progressing  6/29/2025 1654 by Monica Levine RN  Outcome: Progressing     Problem: Discharge Planning  Goal: Discharge to home or other facility with appropriate resources  6/30/2025 0604 by Nisha Almaguer RN  Outcome: Progressing  Flowsheets (Taken 6/29/2025 2000)  Discharge to home or other facility with appropriate resources: Arrange for needed discharge resources and transportation as appropriate  6/29/2025 1654 by Monica Levine RN  Outcome: Progressing

## 2025-06-30 NOTE — PLAN OF CARE
Problem: Respiratory - Adult  Goal: Achieves optimal ventilation and oxygenation  6/30/2025 1626 by Nimco Foy RN  Outcome: Progressing  Flowsheets (Taken 6/30/2025 1626)  Achieves optimal ventilation and oxygenation:   Assess for changes in respiratory status   Assess for changes in mentation and behavior   Position to facilitate oxygenation and minimize respiratory effort   Oxygen supplementation based on oxygen saturation or arterial blood gases   Initiate smoking cessation protocol as indicated   Encourage broncho-pulmonary hygiene including cough, deep breathe, incentive spirometry   Assess the need for suctioning and aspirate as needed   Assess and instruct to report shortness of breath or any respiratory difficulty   Respiratory therapy support as indicated  6/30/2025 0604 by Nisha Almaguer RN  Outcome: Progressing     Problem: Safety - Adult  Goal: Free from fall injury  6/30/2025 1626 by Nimco Foy RN  Outcome: Progressing  Flowsheets (Taken 6/30/2025 1626)  Free From Fall Injury:   Instruct family/caregiver on patient safety   Based on caregiver fall risk screen, instruct family/caregiver to ask for assistance with transferring infant if caregiver noted to have fall risk factors  6/30/2025 0604 by Nisha Almaguer, RN  Outcome: Progressing     Problem: Discharge Planning  Goal: Discharge to home or other facility with appropriate resources  6/30/2025 1626 by Nimco Foy RN  Outcome: Progressing  Flowsheets (Taken 6/30/2025 1626)  Discharge to home or other facility with appropriate resources:   Refer to discharge planning if patient needs post-hospital services based on physician order or complex needs related to functional status, cognitive ability or social support system   Arrange for interpreters to assist at discharge as needed   Identify discharge learning needs (meds, wound care, etc)   Arrange for needed discharge resources and transportation as appropriate   Identify

## 2025-06-30 NOTE — PLAN OF CARE
Problem: Respiratory - Adult  Goal: Achieves optimal ventilation and oxygenation  6/29/2025 2113 by Alanna العلي RCP  Outcome: Progressing

## 2025-07-01 ENCOUNTER — APPOINTMENT (OUTPATIENT)
Dept: GENERAL RADIOLOGY | Age: 80
DRG: 291 | End: 2025-07-01
Payer: COMMERCIAL

## 2025-07-01 VITALS
WEIGHT: 171 LBS | SYSTOLIC BLOOD PRESSURE: 113 MMHG | HEIGHT: 64 IN | OXYGEN SATURATION: 100 % | DIASTOLIC BLOOD PRESSURE: 44 MMHG | HEART RATE: 58 BPM | RESPIRATION RATE: 20 BRPM | BODY MASS INDEX: 29.19 KG/M2 | TEMPERATURE: 98.2 F

## 2025-07-01 PROBLEM — I50.30 HEART FAILURE WITH PRESERVED EJECTION FRACTION (HCC): Status: ACTIVE | Noted: 2025-07-01

## 2025-07-01 PROBLEM — J96.01 ACUTE HYPOXIC RESPIRATORY FAILURE (HCC): Status: RESOLVED | Noted: 2025-06-27 | Resolved: 2025-07-01

## 2025-07-01 LAB
ECHO AO ROOT DIAM: 3.5 CM
ECHO AO ROOT INDEX: 1.91 CM/M2
ECHO AV AREA PEAK VELOCITY: 2.7 CM2
ECHO AV AREA VTI: 3.2 CM2
ECHO AV AREA/BSA PEAK VELOCITY: 1.5 CM2/M2
ECHO AV AREA/BSA VTI: 1.7 CM2/M2
ECHO AV MEAN GRADIENT: 3 MMHG
ECHO AV MEAN VELOCITY: 0.8 M/S
ECHO AV PEAK GRADIENT: 7 MMHG
ECHO AV PEAK VELOCITY: 1.3 M/S
ECHO AV VELOCITY RATIO: 0.85
ECHO AV VTI: 20.2 CM
ECHO BSA: 1.87 M2
ECHO IVC PROX: 1.7 CM
ECHO LA AREA 2C: 20.2 CM2
ECHO LA AREA 4C: 22.9 CM2
ECHO LA DIAMETER INDEX: 2.57 CM/M2
ECHO LA DIAMETER: 4.7 CM
ECHO LA MAJOR AXIS: 5.6 CM
ECHO LA MINOR AXIS: 5.9 CM
ECHO LA TO AORTIC ROOT RATIO: 1.34
ECHO LA VOL BP: 68 ML (ref 22–52)
ECHO LA VOL MOD A2C: 57 ML (ref 22–52)
ECHO LA VOL MOD A4C: 77 ML (ref 22–52)
ECHO LA VOL/BSA BIPLANE: 37 ML/M2 (ref 16–34)
ECHO LA VOLUME INDEX MOD A2C: 31 ML/M2 (ref 16–34)
ECHO LA VOLUME INDEX MOD A4C: 42 ML/M2 (ref 16–34)
ECHO LV E' LATERAL VELOCITY: 16.5 CM/S
ECHO LV E' SEPTAL VELOCITY: 11.6 CM/S
ECHO LV EDV A2C: 50 ML
ECHO LV EDV A4C: 60 ML
ECHO LV EDV INDEX A4C: 33 ML/M2
ECHO LV EDV NDEX A2C: 27 ML/M2
ECHO LV EF PHYSICIAN: 59 %
ECHO LV EJECTION FRACTION A2C: 63 %
ECHO LV EJECTION FRACTION A4C: 56 %
ECHO LV EJECTION FRACTION BIPLANE: 59 % (ref 55–100)
ECHO LV ESV A2C: 19 ML
ECHO LV ESV A4C: 26 ML
ECHO LV ESV INDEX A2C: 10 ML/M2
ECHO LV ESV INDEX A4C: 14 ML/M2
ECHO LV INTERNAL DIMENSION DIASTOLE INDEX: 2.51 CM/M2
ECHO LV INTERNAL DIMENSION DIASTOLIC: 4.6 CM (ref 3.9–5.3)
ECHO LV IVSD: 0.7 CM (ref 0.6–0.9)
ECHO LV MASS 2D: 117.9 G (ref 67–162)
ECHO LV MASS INDEX 2D: 64.4 G/M2 (ref 43–95)
ECHO LV POSTERIOR WALL DIASTOLIC: 0.9 CM (ref 0.6–0.9)
ECHO LV RELATIVE WALL THICKNESS RATIO: 0.39
ECHO LVOT AREA: 3.1 CM2
ECHO LVOT AV VTI INDEX: 1.02
ECHO LVOT DIAM: 2 CM
ECHO LVOT MEAN GRADIENT: 2 MMHG
ECHO LVOT PEAK GRADIENT: 5 MMHG
ECHO LVOT PEAK VELOCITY: 1.1 M/S
ECHO LVOT STROKE VOLUME INDEX: 35.5 ML/M2
ECHO LVOT SV: 65 ML
ECHO LVOT VTI: 20.7 CM
ECHO MV AREA VTI: 3.2 CM2
ECHO MV E DECELERATION TIME (DT): 140 MS
ECHO MV E VELOCITY: 1.03 M/S
ECHO MV E/E' LATERAL: 6.24
ECHO MV E/E' RATIO (AVERAGED): 7.56
ECHO MV E/E' SEPTAL: 8.88
ECHO MV LVOT VTI INDEX: 0.99
ECHO MV MAX VELOCITY: 1.2 M/S
ECHO MV MEAN GRADIENT: 2 MMHG
ECHO MV MEAN VELOCITY: 0.7 M/S
ECHO MV PEAK GRADIENT: 6 MMHG
ECHO MV REGURGITANT PEAK GRADIENT: 55 MMHG
ECHO MV REGURGITANT PEAK VELOCITY: 3.7 M/S
ECHO MV REGURGITANT VTIA: 68.3 CM
ECHO MV VTI: 20.4 CM
ECHO PULMONARY ARTERY END DIASTOLIC PRESSURE: 8 MMHG
ECHO PV MAX VELOCITY: 0.6 M/S
ECHO PV PEAK GRADIENT: 2 MMHG
ECHO PV REGURGITANT MAX VELOCITY: 1.4 M/S
ECHO RA AREA 4C: 20.9 CM2
ECHO RA END SYSTOLIC VOLUME APICAL 4 CHAMBER INDEX BSA: 38 ML/M2
ECHO RA VOLUME: 70 ML
ECHO RV BASAL DIMENSION: 4 CM
ECHO RV INTERNAL DIMENSION: 2.8 CM
ECHO RV MID DIMENSION: 3 CM
ECHO RV TAPSE: 2.6 CM (ref 1.7–?)
ECHO TV REGURGITANT MAX VELOCITY: 2.62 M/S
ECHO TV REGURGITANT PEAK GRADIENT: 27 MMHG
EKG ATRIAL RATE: 46 BPM
EKG P AXIS: 70 DEGREES
EKG P-R INTERVAL: 222 MS
EKG Q-T INTERVAL: 504 MS
EKG QRS DURATION: 100 MS
EKG QTC CALCULATION (BAZETT): 441 MS
EKG R AXIS: 49 DEGREES
EKG T AXIS: 24 DEGREES
EKG VENTRICULAR RATE: 46 BPM
ERYTHROCYTE [DISTWIDTH] IN BLOOD BY AUTOMATED COUNT: 14.6 % (ref 11.8–14.4)
HCT VFR BLD AUTO: 35.8 % (ref 36.3–47.1)
HGB BLD-MCNC: 11.4 G/DL (ref 11.9–15.1)
MCH RBC QN AUTO: 29.6 PG (ref 25.2–33.5)
MCHC RBC AUTO-ENTMCNC: 31.8 G/DL (ref 28.4–34.8)
MCV RBC AUTO: 93 FL (ref 82.6–102.9)
NRBC BLD-RTO: 0 PER 100 WBC
PARTIAL THROMBOPLASTIN TIME: 75.5 SEC (ref 23–36.5)
PLATELET # BLD AUTO: 194 K/UL (ref 138–453)
PMV BLD AUTO: 9.8 FL (ref 8.1–13.5)
RBC # BLD AUTO: 3.85 M/UL (ref 3.95–5.11)
WBC OTHER # BLD: 8.1 K/UL (ref 3.5–11.3)

## 2025-07-01 PROCEDURE — 6370000000 HC RX 637 (ALT 250 FOR IP)

## 2025-07-01 PROCEDURE — 6370000000 HC RX 637 (ALT 250 FOR IP): Performed by: STUDENT IN AN ORGANIZED HEALTH CARE EDUCATION/TRAINING PROGRAM

## 2025-07-01 PROCEDURE — 93005 ELECTROCARDIOGRAM TRACING: CPT | Performed by: STUDENT IN AN ORGANIZED HEALTH CARE EDUCATION/TRAINING PROGRAM

## 2025-07-01 PROCEDURE — 6360000002 HC RX W HCPCS: Performed by: NURSE PRACTITIONER

## 2025-07-01 PROCEDURE — 71045 X-RAY EXAM CHEST 1 VIEW: CPT

## 2025-07-01 PROCEDURE — 94640 AIRWAY INHALATION TREATMENT: CPT

## 2025-07-01 PROCEDURE — 36415 COLL VENOUS BLD VENIPUNCTURE: CPT

## 2025-07-01 PROCEDURE — 93010 ELECTROCARDIOGRAM REPORT: CPT | Performed by: INTERNAL MEDICINE

## 2025-07-01 PROCEDURE — 85027 COMPLETE CBC AUTOMATED: CPT

## 2025-07-01 PROCEDURE — 85730 THROMBOPLASTIN TIME PARTIAL: CPT

## 2025-07-01 PROCEDURE — 99233 SBSQ HOSP IP/OBS HIGH 50: CPT | Performed by: NURSE PRACTITIONER

## 2025-07-01 PROCEDURE — 2700000000 HC OXYGEN THERAPY PER DAY

## 2025-07-01 PROCEDURE — 94761 N-INVAS EAR/PLS OXIMETRY MLT: CPT

## 2025-07-01 PROCEDURE — 94664 DEMO&/EVAL PT USE INHALER: CPT

## 2025-07-01 RX ORDER — MIDODRINE HYDROCHLORIDE 5 MG/1
5 TABLET ORAL
Status: DISCONTINUED | OUTPATIENT
Start: 2025-07-01 | End: 2025-07-01 | Stop reason: HOSPADM

## 2025-07-01 RX ORDER — GUAIFENESIN 600 MG/1
600 TABLET, EXTENDED RELEASE ORAL 2 TIMES DAILY
Qty: 10 TABLET | Refills: 0 | Status: SHIPPED | OUTPATIENT
Start: 2025-07-01 | End: 2025-07-06

## 2025-07-01 RX ORDER — GUAIFENESIN 600 MG/1
600 TABLET, EXTENDED RELEASE ORAL 2 TIMES DAILY
Status: DISCONTINUED | OUTPATIENT
Start: 2025-07-01 | End: 2025-07-01 | Stop reason: HOSPADM

## 2025-07-01 RX ORDER — MIDODRINE HYDROCHLORIDE 5 MG/1
5 TABLET ORAL
Qty: 90 TABLET | Refills: 3 | Status: SHIPPED | OUTPATIENT
Start: 2025-07-01

## 2025-07-01 RX ORDER — ATORVASTATIN CALCIUM 20 MG/1
20 TABLET, FILM COATED ORAL DAILY
Qty: 30 TABLET | Refills: 3 | Status: SHIPPED | OUTPATIENT
Start: 2025-07-02

## 2025-07-01 RX ORDER — METOPROLOL TARTRATE 25 MG/1
25 TABLET, FILM COATED ORAL 2 TIMES DAILY
Qty: 60 TABLET | Refills: 3 | Status: SHIPPED | OUTPATIENT
Start: 2025-07-01

## 2025-07-01 RX ORDER — METOPROLOL TARTRATE 25 MG/1
25 TABLET, FILM COATED ORAL 2 TIMES DAILY
Status: DISCONTINUED | OUTPATIENT
Start: 2025-07-01 | End: 2025-07-01 | Stop reason: HOSPADM

## 2025-07-01 RX ADMIN — LEVALBUTEROL HYDROCHLORIDE 1.25 MG: 1.25 SOLUTION RESPIRATORY (INHALATION) at 11:53

## 2025-07-01 RX ADMIN — MIDODRINE HYDROCHLORIDE 5 MG: 5 TABLET ORAL at 06:22

## 2025-07-01 RX ADMIN — LEVALBUTEROL HYDROCHLORIDE 1.25 MG: 1.25 SOLUTION RESPIRATORY (INHALATION) at 02:20

## 2025-07-01 RX ADMIN — ATORVASTATIN CALCIUM 20 MG: 20 TABLET, FILM COATED ORAL at 08:42

## 2025-07-01 RX ADMIN — MIDODRINE HYDROCHLORIDE 5 MG: 5 TABLET ORAL at 13:02

## 2025-07-01 RX ADMIN — GUAIFENESIN 600 MG: 600 TABLET, EXTENDED RELEASE ORAL at 09:45

## 2025-07-01 RX ADMIN — PANTOPRAZOLE SODIUM 40 MG: 40 TABLET, DELAYED RELEASE ORAL at 06:22

## 2025-07-01 RX ADMIN — LEVETIRACETAM 750 MG: 500 TABLET, FILM COATED ORAL at 08:42

## 2025-07-01 RX ADMIN — HEPARIN SODIUM 18 UNITS/KG/HR: 10000 INJECTION, SOLUTION INTRAVENOUS at 14:28

## 2025-07-01 RX ADMIN — FUROSEMIDE 20 MG: 20 TABLET ORAL at 08:42

## 2025-07-01 RX ADMIN — AMIODARONE HYDROCHLORIDE 200 MG: 200 TABLET ORAL at 13:02

## 2025-07-01 RX ADMIN — BUDESONIDE AND FORMOTEROL FUMARATE DIHYDRATE 2 PUFF: 160; 4.5 AEROSOL RESPIRATORY (INHALATION) at 08:19

## 2025-07-01 NOTE — CARE COORDINATION
Discharge Report    [facility]  Clinical Case Management Department  Written by: aMi Gaines RN    Patient Name: Chantell Yañez  Attending Provider: Tor Bowie MD  Admit Date: 2025 11:45 AM  MRN: [id number]  Account: [id number]                     : 1945  Discharge Date: 25      Disposition: home    Plan is home independently with significant other. Patient has transportation home. Patient does not qualify for home oxygen per testing. Patient agreeable to plan.     Mai Gaines RN

## 2025-07-01 NOTE — DISCHARGE SUMMARY
[facility]  Office: [phone]  Osvaldo Felix DO, Rusty Lundberg DO, Cecilio Ferreira DO, Diaz Galarza DO, Millicent Blanco MD, Evelyne Barajas MD, Anjel Multani MD, Suzi Grover MD,  Rafa Barnes MD, Pa Corbin MD, Dwight Wright DO, Lisa Howell MD,  Tracy Chinchilla DO, Vincent Vega MD, Liam Yañez MD, Chilango Felix DO, Candi Valderrama MD,  Chase Wiggins DO, Jaida Azevedo MD, Tsering Lira MD, Cookie Hill MD, Guanaco Sanchez MD,  Tra Medel MD, Mckenna Rowe MD, Nickolas Yates MD, Obdulio Galvez DO, Kaela Lindquist MD,  Jair Mack MD, Shirley Waterhouse, CNP,  Valentina Linda, CNP, Angelica Broosk, CNP, Mj Owens, CNP,  Lis Patino, DNP, Lilo Devine, CNP, Carmen Ramirez, CNP, Jess Avalos, CNP, Sandra Weinberg, CNP, Aria Sprague, CNP, Ravinder Rubio PA-C, Jody Kasper, CNS, Elma Florence, CNP, Meche Thomas, CNP         [facility]   IN-PATIENT SERVICE   [facility]    Discharge Summary     Patient ID: Chantell Yañez  :  1945   MRN: [id number]     ACCOUNT:  [id number]   Patient's PCP: Nisha Covarrubias APRN - CNP  Admit Date: 2025   Discharge Date: 2025  Length of Stay: 4  Code Status:  Full Code  Admitting Physician: Chase Wiggins DO  Discharge Physician: Tor Bowie MD     Active Discharge Diagnoses:     Hospital Problem Lists:  Principal Problem (Resolved):    Acute hypoxic respiratory failure (HCC)  Active Problems:    Heart failure with preserved ejection fraction (HCC)    GERD (gastroesophageal reflux disease)    Hypercholesteremia    Essential hypertension    Atrial fibrillation with rapid ventricular response (HCC)    Hypokalemia    Seizure disorder (HCC)    Acute congestive heart failure (HCC)  Resolved Problems:    Paroxysmal atrial fibrillation (HCC)    Shortness of breath      Admission Condition:  fair     Discharged Condition: good    Hospital Stay:     Hospital Course:  Chantell Yañez is

## 2025-07-01 NOTE — PLAN OF CARE
Problem: Respiratory - Adult  Goal: Achieves optimal ventilation and oxygenation  7/1/2025 0519 by Nisha Burleson RN  Outcome: Progressing  Flowsheets (Taken 6/30/2025 2000)  Achieves optimal ventilation and oxygenation: Assess for changes in respiratory status  6/30/2025 1626 by Nimco Foy RN  Outcome: Progressing  Flowsheets (Taken 6/30/2025 1626)  Achieves optimal ventilation and oxygenation:   Assess for changes in respiratory status   Assess for changes in mentation and behavior   Position to facilitate oxygenation and minimize respiratory effort   Oxygen supplementation based on oxygen saturation or arterial blood gases   Initiate smoking cessation protocol as indicated   Encourage broncho-pulmonary hygiene including cough, deep breathe, incentive spirometry   Assess the need for suctioning and aspirate as needed   Assess and instruct to report shortness of breath or any respiratory difficulty   Respiratory therapy support as indicated     Problem: Safety - Adult  Goal: Free from fall injury  7/1/2025 0519 by Nisha Burleson RN  Outcome: Progressing  6/30/2025 1626 by Nimco Foy RN  Outcome: Progressing  Flowsheets (Taken 6/30/2025 1626)  Free From Fall Injury:   Instruct family/caregiver on patient safety   Based on caregiver fall risk screen, instruct family/caregiver to ask for assistance with transferring infant if caregiver noted to have fall risk factors     Problem: Discharge Planning  Goal: Discharge to home or other facility with appropriate resources  7/1/2025 0519 by Nisha Burleson RN  Outcome: Progressing  Flowsheets (Taken 6/30/2025 2000)  Discharge to home or other facility with appropriate resources: Identify barriers to discharge with patient and caregiver  6/30/2025 1626 by Nimco Foy RN  Outcome: Progressing  Flowsheets (Taken 6/30/2025 1626)  Discharge to home or other facility with appropriate resources:   Refer to discharge planning if patient needs post-hospital

## 2025-07-01 NOTE — DISCHARGE INSTRUCTIONS
Follow-up outpatient with PCP and cardiology outpatient.  Continue medications as prescribed.    Cardiology recommendation:  HR stable. Continue amiodarone and BB.   Continue heparin drip and resume Eliquis when ok with others.  Continue statin.  ECHO reviewed. EF 59%. Normal wall motion. Mild to moderate MR. Mild TR.  Continue GDMT as tolerated. Continue BB and lasix. Consider Jardiance on discharge.   If symptoms improve- Ok for patient to be discharged per CV standpoint and for patient to follow up outpatient in 2-4 weeks.

## 2025-07-01 NOTE — PLAN OF CARE
Problem: Respiratory - Adult  Goal: Achieves optimal ventilation and oxygenation  7/1/2025 1628 by Lucrecia Wright RN  Outcome: Completed  7/1/2025 0519 by Nisha Burleson RN  Outcome: Progressing  Flowsheets (Taken 6/30/2025 2000)  Achieves optimal ventilation and oxygenation: Assess for changes in respiratory status     Problem: Safety - Adult  Goal: Free from fall injury  7/1/2025 1628 by Lucrecia Wright RN  Outcome: Completed  7/1/2025 0519 by Nisha Burleson RN  Outcome: Progressing     Problem: Discharge Planning  Goal: Discharge to home or other facility with appropriate resources  7/1/2025 1628 by Lucrecia Wright RN  Outcome: Completed  7/1/2025 0519 by Nisha Burleson RN  Outcome: Progressing  Flowsheets (Taken 6/30/2025 2000)  Discharge to home or other facility with appropriate resources: Identify barriers to discharge with patient and caregiver     Problem: Chronic Conditions and Co-morbidities  Goal: Patient's chronic conditions and co-morbidity symptoms are monitored and maintained or improved  7/1/2025 1628 by Lucrecia Wright RN  Outcome: Completed  7/1/2025 0519 by Nisha Burleson RN  Outcome: Progressing  Flowsheets (Taken 6/30/2025 2000)  Care Plan - Patient's Chronic Conditions and Co-Morbidity Symptoms are Monitored and Maintained or Improved: Monitor and assess patient's chronic conditions and comorbid symptoms for stability, deterioration, or improvement

## 2025-07-01 NOTE — PROGRESS NOTES
06/29/25 0844   Care Plan - Respiratory Goals   Achieves optimal ventilation and oxygenation Assess for changes in respiratory status;Assess for changes in mentation and behavior;Position to facilitate oxygenation and minimize respiratory effort;Oxygen supplementation based on oxygen saturation or arterial blood gases;Encourage broncho-pulmonary hygiene including cough, deep breathe, incentive spirometry;Assess the need for suctioning and aspirate as needed;Assess and instruct to report shortness of breath or any respiratory difficulty;Respiratory therapy support as indicated       
   07/01/25 1609   Resting (Room Air)   SpO2 97   HR 56   During Walk (Room Air)   SpO2 91   HR 65   Walk/Assistance Device Ambulation   Rate of Dyspnea 0   Comments Pt dropped from 97% to 91% while walking.   After Walk   SpO2 94   HR 61   Rate of Dyspnea 0   Does the Patient Qualify for Home O2 No   Does the Patient Need Portable Oxygen Tanks No       
  Physician Progress Note      PATIENT:               DEBBIE PRATER  CSN #:                  [id number]  :                       1945  ADMIT DATE:       2025 11:45 AM  DISCH DATE:  RESPONDING  PROVIDER #:        Tor Bowie MD          QUERY TEXT:    Based on your medical judgment, please clarify these findings and document if   any of the following are being evaluated and/or treated:    The clinical indicators include:  79 yo F presented with Acute Hypoxic respiratory failure/ CHF exacerbation.   Know Hx Paroxysmal A fib and takes Eliquis, metoprolol and   amiodarone-continued on admission. S/p recent Cardioversion on 25.  Options provided:  -- Secondary hypercoagulable state in a patient with atrial fibrillation  -- Other - I will add my own diagnosis  -- Disagree - Not applicable / Not valid  -- Disagree - Clinically unable to determine / Unknown  -- Refer to Clinical Documentation Reviewer    PROVIDER RESPONSE TEXT:    This patient has secondary hypercoagulable state in a patient with atrial   fibrillation.    Query created by: Gia Anderson on 2025 6:46 AM      Electronically signed by:  Tor Bowie MD 2025 12:33 PM          
CLINICAL PHARMACY NOTE: MEDS TO BEDS    Total # of Prescriptions Filled: 5   The following medications were delivered to the patient:  Metoprolol  Midodrine  Mucus relief  Jardiance  atorvastatin    Additional Documentation:    
[facility]   Progress Note                   Date:   6/28/2025  Patient name: Chantell Yañez  Date of admission:  6/27/2025 11:45 AM  MRN:   [id number]  YOB: 1945  PCP: Nisha Covarrubias, APRN - CNP    Reason for Admission: Shortness of breath [R06.02]  Acute congestive heart failure, unspecified heart failure type (HCC) [I50.9]  Acute hypoxic respiratory failure (HCC) [J96.01]    Subjective:       Clinical Changes / Abnormalities: Pt seen and examined in the room.  Patient resting in bed. Pt denies any CP. She reports that her breathing is the best it has felt since admission. Labs, vitals and tele reviewed- SR    Medications:   Scheduled Meds:   sodium chloride flush  5-40 mL IntraVENous 2 times per day    amiodarone  200 mg Oral Daily    apixaban  5 mg Oral BID    budesonide-formoterol  2 puff Inhalation BID RT    levETIRAcetam  750 mg Oral BID    atorvastatin  20 mg Oral Daily    pantoprazole  40 mg Oral QAM AC    [Held by provider] metoprolol tartrate  50 mg Oral BID    albuterol sulfate HFA  2 puff Inhalation BID RT     Continuous Infusions:   sodium chloride       CBC:   Recent Labs     06/27/25  1158   WBC 12.4*   HGB 11.9        BMP:    Recent Labs     06/26/25  1407 06/27/25  1158   NA  --  141   K  --  4.1   CL  --  99   CO2  --  28   BUN  --  15   CREATININE 0.8 1.1*   GLUCOSE  --  116*     Hepatic:   Recent Labs     06/27/25  1158   AST 32   ALT 19   BILITOT 0.8   ALKPHOS 96     Troponin:   Recent Labs     06/27/25  1158 06/27/25  1248   TROPHS 13 13     BNP: No results for input(s): \"BNP\" in the last 72 hours.  Lipids: No results for input(s): \"CHOL\", \"HDL\" in the last 72 hours.    Invalid input(s): \"LDLCALCU\"  INR: No results for input(s): \"INR\" in the last 72 hours.    Objective:   Vitals: BP (!) 108/58   Pulse 57   Temp 97.9 °F (36.6 °C) (Oral)   Resp 16   Ht 1.626 m (5' 4\")   Wt 76.3 kg (168 lb 3.4 oz)   LMP  (LMP Unknown)   SpO2 100%   BMI 28.87 kg/m² 
[facility]   Progress Note                   Date:   6/29/2025  Patient name: Chantell Yañez  Date of admission:  6/27/2025 11:45 AM  MRN:   [id number]  YOB: 1945  PCP: Nisha Covarrubias, JUDAH - CNP    Reason for Admission: Shortness of breath [R06.02]  Acute congestive heart failure, unspecified heart failure type (HCC) [I50.9]  Acute hypoxic respiratory failure (HCC) [J96.01]    Subjective:       Clinical Changes / Abnormalities: Pt seen and examined in the room.  Patient resting in bed. Pt denies any CP. She reports that her breathing continues to improve. Labs, vitals and tele reviewed    Medications:   Scheduled Meds:   furosemide  20 mg IntraVENous BID    ipratropium 0.5 mg-albuterol 2.5 mg  1 Dose Inhalation 4x Daily RT    sodium chloride flush  5-40 mL IntraVENous 2 times per day    amiodarone  200 mg Oral Daily    apixaban  5 mg Oral BID    budesonide-formoterol  2 puff Inhalation BID RT    levETIRAcetam  750 mg Oral BID    atorvastatin  20 mg Oral Daily    pantoprazole  40 mg Oral QAM AC    [Held by provider] metoprolol tartrate  50 mg Oral BID     Continuous Infusions:   sodium chloride       CBC:   Recent Labs     06/27/25  1158 06/28/25  0938 06/29/25  0541   WBC 12.4* 9.6 9.1   HGB 11.9 10.1* 9.8*    166 150     BMP:    Recent Labs     06/26/25  1407 06/27/25  1158 06/28/25  0938   NA  --  141 137   K  --  4.1 3.2*   CL  --  99 98   CO2  --  28 24   BUN  --  15 10   CREATININE 0.8 1.1* 0.9   GLUCOSE  --  116* 146*     Hepatic:   Recent Labs     06/27/25  1158   AST 32   ALT 19   BILITOT 0.8   ALKPHOS 96     Troponin:   Recent Labs     06/27/25  1158 06/27/25  1248   TROPHS 13 13     BNP: No results for input(s): \"BNP\" in the last 72 hours.  Lipids: No results for input(s): \"CHOL\", \"HDL\" in the last 72 hours.    Invalid input(s): \"LDLCALCU\"  INR: No results for input(s): \"INR\" in the last 72 hours.    Objective:   Vitals: /79   Pulse 68   Temp 98.1 °F (36.7 
[facility]   Progress Note                   Date:   6/30/2025  Patient name: Chantell Yañez  Date of admission:  6/27/2025 11:45 AM  MRN:   [id number]  YOB: 1945  PCP: Nisha Covarrubias, APRN - CNP    Reason for Admission: Shortness of breath [R06.02]  Acute congestive heart failure, unspecified heart failure type (HCC) [I50.9]  Acute hypoxic respiratory failure (HCC) [J96.01]    Subjective:       Clinical Changes / Abnormalities: Patient seen and examined. Denies chest pain or increased  shortness of breath. Tele/vitals/labs reviewed . No distress , went into A-fib RVR yesterday and was transferred to  on Amio drip heart rate  afib much improved  in the 90s    Medications:   Scheduled Meds:   magnesium sulfate  1,000 mg IntraVENous Once    furosemide  20 mg Oral Daily    metoprolol tartrate  25 mg Oral BID    sodium chloride flush  5-40 mL IntraVENous 2 times per day    amiodarone  200 mg Oral Daily    [Held by provider] apixaban  5 mg Oral BID    budesonide-formoterol  2 puff Inhalation BID RT    levETIRAcetam  750 mg Oral BID    atorvastatin  20 mg Oral Daily    pantoprazole  40 mg Oral QAM AC     Continuous Infusions:   amiodarone 0.5 mg/min (06/30/25 0625)    heparin (PORCINE) Infusion 14 Units/kg/hr (06/30/25 0616)    sodium chloride       CBC:   Recent Labs     06/29/25  0541 06/29/25  1548 06/30/25  0521   WBC 9.1 9.7 8.7   HGB 9.8* 10.4* 11.1*    164 175     BMP:    Recent Labs     06/29/25  0759 06/29/25  1548 06/30/25  0521    139 140   K 3.4* 3.5* 3.6*   CL 98 98 100   CO2 27 25 29   BUN 11 10 8   CREATININE 0.7 0.7 0.6   GLUCOSE 105* 139* 101*     Hepatic:   Recent Labs     06/27/25  1158   AST 32   ALT 19   BILITOT 0.8   ALKPHOS 96     Troponin:   Recent Labs     06/27/25  1158 06/27/25  1248 06/29/25  1548   TROPHS 13 13 10     BNP: No results for input(s): \"BNP\" in the last 72 hours.  Lipids: No results for input(s): \"CHOL\", \"HDL\" in the last 72 
[facility]   Progress Note                   Date:   7/1/2025  Patient name: Chantell Yañez  Date of admission:  6/27/2025 11:45 AM  MRN:   [id number]  YOB: 1945  PCP: Nisha Covarrubias, JUDAH - CNP    Reason for Admission: Shortness of breath [R06.02]  Acute congestive heart failure, unspecified heart failure type (HCC) [I50.9]  Acute hypoxic respiratory failure (HCC) [J96.01]    Subjective:       Clinical Changes / Abnormalities: Pt seen and examined in the room.  Patient resting in bed with family and RN at bedside. Pt denies any CP. She reports that her breathing continues to improve. Labs, vitals and tele reviewed- SR 70    Medications:   Scheduled Meds:   midodrine  5 mg Oral TID WC    guaiFENesin  600 mg Oral BID    metoprolol tartrate  25 mg Oral BID    furosemide  20 mg Oral Daily    sodium chloride flush  5-40 mL IntraVENous 2 times per day    amiodarone  200 mg Oral Daily    [Held by provider] apixaban  5 mg Oral BID    budesonide-formoterol  2 puff Inhalation BID RT    levETIRAcetam  750 mg Oral BID    atorvastatin  20 mg Oral Daily    pantoprazole  40 mg Oral QAM AC     Continuous Infusions:   heparin (PORCINE) Infusion 18 Units/kg/hr (07/01/25 0503)    sodium chloride       CBC:   Recent Labs     06/29/25  1548 06/30/25  0521 06/30/25  2211 07/01/25  0406   WBC 9.7 8.7  --  8.1   HGB 10.4* 11.1* 10.9* 11.4*    175  --  194     BMP:    Recent Labs     06/29/25  0759 06/29/25  1548 06/30/25  0521    139 140   K 3.4* 3.5* 3.6*   CL 98 98 100   CO2 27 25 29   BUN 11 10 8   CREATININE 0.7 0.7 0.6   GLUCOSE 105* 139* 101*     Hepatic:   No results for input(s): \"AST\", \"ALT\", \"BILITOT\", \"ALKPHOS\" in the last 72 hours.    Invalid input(s): \"ALB\"    Troponin:   Recent Labs     06/29/25  1548   TROPHS 10     BNP: No results for input(s): \"BNP\" in the last 72 hours.  Lipids: No results for input(s): \"CHOL\", \"HDL\" in the last 72 hours.    Invalid input(s): 
[facility]  Office: [phone]  Osvaldo Felix DO, Rusty Lundberg, DO, Cecilio Ferreira DO, Diaz Galarza, DO, Millicent Blanco MD, Evelyne Barajas MD, Anjel Multani MD, Suzi Grover MD,  Rafa Barnes MD, Pa Corbin MD, Lisa Howell MD,  Tracy Chinchilla DO, Vincent Vega MD, Liam Yañez MD, Chilango Felix DO, Candi Valderrama MD,  Chase Wiggins DO, Tsering Lira MD, Cookie Hill MD, Guanaco Sanchez MD,  Tra Medel MD, Mckenna Rowe MD, Nickolas Yates MD, David Barlow MD, Tor Bowie MD, Patricia Alvarenga MD, Mj Lema, DO, Lali Moss MD, Vincent Washington DO, Jair Mack MD, Tracy Rome MD, Mohsin Reza, MD, Rohan Leal MD, Shirley Waterhouse, CNP,  Valentina Linda, CNP, Mj Owens, CNP,  Lis Patino, DNP, Lilo Devine, CNP, Carmen Ramirez, CNP, Sandra Weinberg, CNP, Aria Sprague, CNP, Christy Bright, PA-C, Radha Hamlin, CNP, Chato Ward, CNP,  Mandi Keys, CNP, Yady Potter, CNP, Kal Lundberg, PA-C, Anika Umanzor, PA-C, Angelica Brooks, CNP,        Jody Kasper, CNS, Jess Avalos, CNP, Meche Thomas, CNP         [facility]   IN-PATIENT SERVICE   [facility]    Progress Note    6/28/2025    1:23 PM    Name:   Chantell Yañez  MRN:     [id number]     Acct:      [id number]   Room:   0337/0337-02   Day:  1  Admit Date:  6/27/2025 11:45 AM    PCP:   Nisha Covarrubias APRN - CNP  Code Status:  Full Code    Subjective:     C/C:   Chief Complaint   Patient presents with    Shortness of Breath     C/o SOB, recent outpatient cardioversion yesterday for a-fib, sent home, pt. Reports taking additional lasix without improvement       Interval History Status: improved.     Sitting on edge of bed. Remains on supplemental oxygen at 3L. Nursing reports that she is very SOB when ambulating. Patient reports this is the best she has felt. She also reports that she did have home oxygen ordered with previous admission. She states she 
[facility] Ostomy Continence Nurse         NAME:  Chantell Yañez  MEDICAL RECORD NUMBER:  [id number]  AGE: 80 y.o.   GENDER: female  : 1945  TODAY'S DATE:  2025    Subjective:     Reason for WOCN Evaluation and Assessment: \"sacral ulcer\"      Chantell Yañez is a 80 y.o. female referred by:   [x] Physician  [] Nursing  [] Other:     Wound Identification:  No wound identified    Patient denies presence of any wound. She is an independent community ambulator who also denies incontinence.     Wound Ostomy Continence nurse (ET nurse) will no longer follow pt.   Call prn if concerns related to skin, wound, or ostomy care  
Occupational Therapy      Occupational Therapy Initial Evaluation  Facility/Department: [facility] 4B STEPDOWN   Patient Name: Chantell Yañez        MRN: [id number]    : 1945    Date of Service: 2025    Chief Complaint   Patient presents with    Shortness of Breath     C/o SOB, recent outpatient cardioversion yesterday for a-fib, sent home, pt. Reports taking additional lasix without improvement       Past Medical History:  has a past medical history of Aneurysm of abdominal aorta, Atrial fibrillation (HCC), Cancer (HCC), ETD (eustachian tube dysfunction), Facial asymmetries, Fracture, ankle, Gastric ulcer, Headache, History of radiation therapy, Hx of cardiac cath, Hx of gastroenteritis, Hyperlipidemia, Hypertension, IBS (irritable bowel syndrome), Jaw pain, non-TMJ, Mammogram abnormal, Mitral valve prolapse syndrome, Pregnancy, incidental, and Seizures (HCC).  Past Surgical History:  has a past surgical history that includes Colonoscopy (Complete Colonoscopy for Polyp Removal); transesophageal echocardiogram (2018); other surgical history (2018); Appendectomy; Upper gastrointestinal endoscopy; Hysterectomy; Tubal ligation; Tonsillectomy and adenoidectomy; Breast lumpectomy (Right); Upper gastrointestinal endoscopy (N/A, 01/10/2020); Colonoscopy (N/A, 01/10/2020); ep device procedure (N/A, 2023); Cardioversion (2025); and Cardioversion (2025).    Discharge Recommendations  Discharge Recommendations: Continue to assess pending progress, Patient would benefit from continued therapy after discharge  OT Equipment Recommendations  Equipment Needed: No    Assessment  Performance deficits / Impairments: Decreased functional mobility ;Decreased ADL status;Decreased endurance;Decreased high-level IADLs;Decreased strength  Assessment: Patient is normaly independent with functional mobility, personal ADLs and household tasks who presents to OT eval with above deficit affecting ability to 
Patient given discharge instructions and verbalizes understanding. Daughter present at bedside. IV removed. Wheeled off unit.  
Report called to 4B RN. All questions answered  
[facility]  Office: [phone]  Osvaldo Felix, DO, Rusty Lunbderg, DO, Cecilio Ferreira DO, Diaz Galarza, DO, Millicent Blanco MD, Evelyne Barajas MD, Anjel Multani MD, Suzi Grover MD,  Rafa Barnes MD, Pa Corbin MD, Lisa Howell MD,  Tracy Chinchilla DO, Vincent Vega MD, Liam Yañez MD, Chilango Felix DO, Candi Valderrama MD,  Chase Wiggins DO, Tsering Lira MD, Cookie Hill MD, Guanaco Sanchez MD,  Tra Medel MD, Mckenna Rowe MD, Nickolas Yates MD, David Barlow MD, Tor Bowie MD, Patricia Alvarenga MD, Mj Lema, DO, Lali Moss MD, Vincent Washington DO, Jair Mack MD, Trayc Rome MD, Mohsin Reza, MD, Rohan Leal MD, Shirley Waterhouse, CNP,  Valentina Linda, CNP, Mj Owens, CNP,  Lis Patino, ZAC, Lilo Devine, CNP, Carmen Ramirez, CNP, Sandra Weinberg, CNP, Aria Sprague, CNP, Christy Bright, PA-C, Radha Hamlin, CNP, Chato Ward, CNP,  Mandi Keys, CNP, Yady Potter, CNP, Kal Lundberg, PA-C, Anika Umanzor, PA-C, Angelica Brooks, CNP,        Jody Kasper, CNS, Jess Avalos, CNP, Meche Thomas, CNP         [facility]   IN-PATIENT SERVICE   [facility]    Progress Note    6/30/2025    12:32 PM    Name:   Chantell Yañez  MRN:     [id number]     Acct:      [id number]   Room:   [facility]/04[facility] Day:  3  Admit Date:  6/27/2025 11:45 AM    PCP:   Nisha Covarrubias APRN - CNP  Code Status:  Full Code    Subjective:     C/C:   Chief Complaint   Patient presents with    Shortness of Breath     C/o SOB, recent outpatient cardioversion yesterday for a-fib, sent home, pt. Reports taking additional lasix without improvement       Interval History Status: improved.     Vitals reviewed, afebrile and intermittently tachypneic and tachycardic in atrial fibrillation.  Saturating well on 2 L nasal cannula.  Labs reviewed, hyperkalemia 3.6, no leukocytosis, hemoglobin stable, platelets are stable.  Overnight patient had no 
[facility]  Office: [phone]  Osvaldo Felix DO, Rusty Lundberg, DO, Cecilio Ferreira DO, Diaz Galarza, DO, Millicent Blanco MD, Evelyne Barajas MD, Anjel Multani MD, Suzi Grover MD,  Rafa Barnes MD, Pa Corbin MD, Lisa Howell MD,  Tracy Chinchilla DO, Vincent Vega MD, Liam Yañez MD, Chilango Felix DO, Candi Valderrama MD,  Chase Wiggins DO, Tsering Lira MD, Cookie Hill MD, Guanaco Sanchez MD,  Tra Medel MD, Mckenna Rowe MD, Nickolas Yates MD, David Barlow MD, Tor Bowie MD, Patricia Alvarenga MD, Mj Lema, DO, Lali Moss MD, Vincent Washington DO, Jair Mack MD, Tracy Rome MD, Mohsin Reza, MD, Rohan Leal MD, Shirley Waterhouse, CNP,  Valentina Linda, CNP, Mj Owens, CNP,  Lis Patino, ZAC, Lilo Devine CNP, Carmne Ramirez, CNP, Sandra Weinberg, CNP, Aria Sprague, CNP, Christy Bright, PA-C, Radha Hamlin, CNP, Chato Ward, CNP,  Mandi Keys, CNP, Yady Potter, CNP, Kal Lundberg, PA-C, Anika Umanzor, PA-C, Angelica Brooks, CNP,        Jody Kasper, CNS, Jess Avalos, CNP, Meche Thomas, CNP         [facility]   IN-PATIENT SERVICE   [facility]    Progress Note    7/1/2025    12:00 PM    Name:   Chantell Yañez  MRN:     [id number]     Acct:      [id number]   Room:   [facility]/04[facility] Day:  4  Admit Date:  6/27/2025 11:45 AM    PCP:   Nisha Covarrubias APRN - CNP  Code Status:  Full Code    Subjective:     C/C:   Chief Complaint   Patient presents with    Shortness of Breath     C/o SOB, recent outpatient cardioversion yesterday for a-fib, sent home, pt. Reports taking additional lasix without improvement       Interval History Status: improved.     Patient seen and examined this morning, patient heart rate is running low in 40-50s, morning dose of Lopressor was held.  Currently on heparin gtt.  Echo reviewed, normal wall motion, EF 59%.  Awaiting cardiology input.  No other acute events overnight.  
[facility]  Office: [phone]  Osvaldo Felix DO, Rusty Lundberg, DO, Cecilio Ferreira DO, Diaz Galarza, DO, Millicent Blanco MD, Evelyne Barajas MD, Anjel Multani MD, Suzi Grover MD,  Rafa Barnes MD, Pa Corbin MD, Lisa Howell MD,  Tracy Chinchilla DO, Vincent Vega MD, Liam Yañez MD, Chilango Felix DO, Candi Valderrama MD,  Chase Wiggins DO, Tsering Lira MD, Cookie Hill MD, Guanaco Sanchez MD,  Tra Medel MD, Mckenna Rowe MD, Nickolas Yates MD, David Barlow MD, Tor Bowie MD, Patricia Alvarenga MD, Mj Lema, DO, Lali Moss MD, Vincent Washington DO, Jair Mack MD, Tracy Rome MD, Mohsin Reza, MD, Rohan Leal MD, Shirley Waterhouse, CNP,  Valentina Linda, CNP, Mj Owens, CNP,  Lis Patino, ZAC, Lilo Devine, CNP, Carmen Ramirez, CNP, Sandra Weinberg, CNP, Aria Sprague, CNP, Christy Bright, PA-C, Radha Hamlin, CNP, Chato Ward, CNP,  Mandi Keys, CNP, Yady Potter, CNP, Kal Lundberg, PA-C, Anika Umanzor, PA-C, Angelica Brooks, CNP,        Jody Kasper, CNS, Jess Avalos, CNP, Meche Thomas, CNP         [facility]   IN-PATIENT SERVICE   [facility]    Progress Note    6/29/2025    9:15 AM    Name:   Chantell Yañez  MRN:     [id number]     Acct:      [id number]   Room:   0337/0337-02   Day:  2  Admit Date:  6/27/2025 11:45 AM    PCP:   Nisha Covarrubias APRN - CNP  Code Status:  Full Code    Subjective:     C/C:   Chief Complaint   Patient presents with    Shortness of Breath     C/o SOB, recent outpatient cardioversion yesterday for a-fib, sent home, pt. Reports taking additional lasix without improvement       Interval History Status: improved.     Reports she is feeling better this morning, although nursing reported patient was extremely wheezy and SOB earlier. RT at bedside for inhaler. Satting well and talking in full sentences on 3L NC. Discussed weaning as tolerated. Discussed adding oral steroid 
Writer notified Angelica Brooks NP of patient's bradycardia. Per NP, hold oral rate control meds for now and will discuss during rounds.   
understanding    Plan  Physical Therapy Plan  General Plan:  (5-6x wk)  Current Treatment Recommendations: Strengthening, Balance training, Functional mobility training, Transfer training, Gait training, Endurance training, Stair training, Safety education & training, Therapeutic activities    Goals  Short Term Goals  Time Frame for Short Term Goals: 10 visits  Short Term Goal 1: transfers with SBA  Short Term Goal 2: amb 125 ft with a RW x SBA  Short Term Goal 3: ascend/descend 4 steps with SBA  Short Term Goal 4: 20 min strengthening exercises x SBA    Minutes  PT Individual Minutes  Time In: 1325  Time Out: 1348  Minutes: 23    Electronically signed by Phani Alejo PT on 6/30/25 at 3:04 PM EDT

## 2025-07-02 ENCOUNTER — TELEPHONE (OUTPATIENT)
Dept: PRIMARY CARE CLINIC | Age: 80
End: 2025-07-02

## 2025-07-02 LAB
EKG ATRIAL RATE: 110 BPM
EKG Q-T INTERVAL: 370 MS
EKG Q-T INTERVAL: 404 MS
EKG QRS DURATION: 140 MS
EKG QRS DURATION: 96 MS
EKG QTC CALCULATION (BAZETT): 585 MS
EKG R AXIS: -74 DEGREES
EKG R AXIS: 50 DEGREES
EKG T AXIS: 248 DEGREES
EKG T AXIS: 65 DEGREES
EKG VENTRICULAR RATE: 125 BPM
EKG VENTRICULAR RATE: 126 BPM

## 2025-07-02 PROCEDURE — 93010 ELECTROCARDIOGRAM REPORT: CPT | Performed by: INTERNAL MEDICINE

## 2025-07-02 NOTE — TELEPHONE ENCOUNTER
Care Transitions Initial Follow Up Call    Outreach made within 2 business days of discharge: Yes    Patient: Chantell Yañez Patient : 1945   MRN: [id number]  Reason for Admission: ob  Discharge Date: 25       Spoke with: lvm to call back and schedule    Discharge department/facility: [facility] Interactive Patient Contact:  Was patient able to fill all prescriptions: Yes  Was patient instructed to bring all medications to the follow-up visit: No: lvm  Is patient taking all medications as directed in the discharge summary? lvm  Does patient understand their discharge instructions: No: lvm  Does patient have questions or concerns that need addressed prior to 7-14 day follow up office visit: no    Additional needs identified to be addressed with provider  No needs identified lvm to call back and schedule appt for hfu              Scheduled appointment with PCP within 7-14 days    Follow Up  No future appointments.    Rupa Knapp MA

## 2025-07-03 DIAGNOSIS — I50.9 ACUTE CONGESTIVE HEART FAILURE, UNSPECIFIED HEART FAILURE TYPE (HCC): Primary | ICD-10-CM

## 2025-07-03 RX ORDER — BUDESONIDE AND FORMOTEROL FUMARATE DIHYDRATE 160; 4.5 UG/1; UG/1
2 AEROSOL RESPIRATORY (INHALATION)
Qty: 10.2 G | Refills: 3 | Status: SHIPPED | OUTPATIENT
Start: 2025-07-03

## 2025-07-03 NOTE — TELEPHONE ENCOUNTER
Pt needing refill of symbicort to [facility] on [address]. She tried to get cardio to fill but they advised pcp.

## 2025-07-03 NOTE — TELEPHONE ENCOUNTER
Care Transitions Initial Follow Up Call    Outreach made within 2 business days of discharge: Yes    Patient: Chantell Yañez Patient : 1945   MRN: [id number]  Reason for Admission: respiratory failure  Discharge Date: 25       Spoke with: pt    Discharge department/facility: [facility] Interactive Patient Contact:  Was patient able to fill all prescriptions: Yes  Was patient instructed to bring all medications to the follow-up visit: Yes  Is patient taking all medications as directed in the discharge summary? yes  Does patient understand their discharge instructions: Yes  Does patient have questions or concerns that need addressed prior to 7-14 day follow up office visit: no    Additional needs identified to be addressed with provider  Standard priority: pt may call back to request refill of symbicort. She called cardio and they said they will try to fill for her. I told her to call back if they cannot.   Pt scheduled for 25 as per her preference due to transportation and other appts.             Scheduled appointment with PCP within 7-14 days    Follow Up  Future Appointments   Date Time Provider Department Center   2025 12:45 PM [facility]   2025  2:45 PM Jess Avalos APRN - CNP [facility] ILANA Knapp MA

## 2025-07-25 ENCOUNTER — OFFICE VISIT (OUTPATIENT)
Dept: PRIMARY CARE CLINIC | Age: 80
End: 2025-07-25

## 2025-07-25 VITALS
HEART RATE: 60 BPM | WEIGHT: 173 LBS | HEIGHT: 63 IN | BODY MASS INDEX: 30.65 KG/M2 | OXYGEN SATURATION: 97 % | SYSTOLIC BLOOD PRESSURE: 129 MMHG | TEMPERATURE: 97 F | DIASTOLIC BLOOD PRESSURE: 61 MMHG

## 2025-07-25 DIAGNOSIS — I50.30 HEART FAILURE WITH PRESERVED EJECTION FRACTION, UNSPECIFIED HF CHRONICITY (HCC): Primary | ICD-10-CM

## 2025-07-25 DIAGNOSIS — E78.00 HYPERCHOLESTEREMIA: ICD-10-CM

## 2025-07-25 DIAGNOSIS — I48.91 ATRIAL FIBRILLATION WITH RVR (HCC): ICD-10-CM

## 2025-07-25 DIAGNOSIS — Z79.899 LONG TERM CURRENT USE OF AMIODARONE: ICD-10-CM

## 2025-07-25 DIAGNOSIS — L24.9 IRRITANT CONTACT DERMATITIS, UNSPECIFIED TRIGGER: ICD-10-CM

## 2025-07-25 RX ORDER — TRIAMCINOLONE ACETONIDE 0.25 MG/G
OINTMENT TOPICAL
Qty: 80 G | Refills: 0 | Status: SHIPPED | OUTPATIENT
Start: 2025-07-25 | End: 2025-08-01

## 2025-07-25 RX ORDER — AMIODARONE HYDROCHLORIDE 200 MG/1
200 TABLET ORAL DAILY
Qty: 90 TABLET | Refills: 0
Start: 2025-07-25 | End: 2025-10-23

## 2025-07-25 ASSESSMENT — PATIENT HEALTH QUESTIONNAIRE - PHQ9
SUM OF ALL RESPONSES TO PHQ QUESTIONS 1-9: 0
1. LITTLE INTEREST OR PLEASURE IN DOING THINGS: NOT AT ALL
2. FEELING DOWN, DEPRESSED OR HOPELESS: NOT AT ALL
SUM OF ALL RESPONSES TO PHQ QUESTIONS 1-9: 0

## 2025-07-25 NOTE — PROGRESS NOTES
[address] [facility] [place] MAIN FLOOR  [facility] [id number]   7/25/2025    Chantell Yañez is a 80 y.o. female who presents today for her medical conditions and/or complaints as noted below.    Chantell Yañez is scheduled today for Follow-Up from Hospital  .      HPI:     History of Present Illness  The patient is an 80-year-old female here today for a hospital follow-up. She presented on 06/27/2025 to [facility] ER for shortness of breath and was hospitalized until 07/01/2025 for acute respiratory failure secondary to heart failure. She has a history of atrial fibrillation and is status post cardioversion on 06/26/2025 with a history of COPD. She was diuresed back into [facility], which caused worsening dyspnea. She was started on amiodarone with possible repeat cardioversion. Her rate improved and did not require home oxygen. Upon discharge, she was continued on amiodarone and beta-blocker. Ejection fraction was at 59% with moderate mitral regurgitation. She was also continued on Lasix and Jardiance 10 mg was added at discharge. She sees cardiology on Monday for a hospital follow-up.    She reports that her breathing is currently stable, and she is able to engage in activities such as gardening. She monitors her weight daily at home. She is scheduled to see her cardiologist next Monday. She is currently taking amiodarone once daily and Eliquis twice daily. She also takes Keppra for seizure disorder and metoprolol, which she holds if her heart rate is below 60.    She mentions an incident where she was possibly bitten by mosquitoes while gardening, resulting in itchy rashes on both sides of her body, primarily in non-exposed areas. These rashes appeared approximately 4 to 5 days ago and are present on both sides of her groin, behind her knees, and on her thighs. She has been scratching the affected areas and applying an over-the-counter anti-itch cream. She describes the sensation as more itchy than

## 2025-08-05 ENCOUNTER — HOSPITAL ENCOUNTER (OUTPATIENT)
Age: 80
Setting detail: SPECIMEN
Discharge: HOME | End: 2025-08-05

## 2025-08-05 DIAGNOSIS — Z79.899 LONG TERM CURRENT USE OF AMIODARONE: ICD-10-CM

## 2025-08-05 DIAGNOSIS — E78.00 HYPERCHOLESTEREMIA: ICD-10-CM

## 2025-08-05 LAB
CHOLEST SERPL-MCNC: 219 MG/DL (ref 0–199)
CHOLESTEROL/HDL RATIO: 2.5
HDLC SERPL-MCNC: 88 MG/DL
LDLC SERPL CALC-MCNC: 111 MG/DL (ref 0–100)
T4 FREE SERPL-MCNC: 1.4 NG/DL (ref 0.9–1.7)
TRIGL SERPL-MCNC: 101 MG/DL
TSH SERPL DL<=0.05 MIU/L-ACNC: 5 UIU/ML (ref 0.27–4.2)
VLDLC SERPL CALC-MCNC: 20 MG/DL (ref 1–30)

## 2025-08-06 ENCOUNTER — RESULTS FOLLOW-UP (OUTPATIENT)
Dept: PRIMARY CARE CLINIC | Age: 80
End: 2025-08-06

## 2025-08-14 ENCOUNTER — TELEPHONE (OUTPATIENT)
Dept: PRIMARY CARE CLINIC | Age: 80
End: 2025-08-14

## (undated) DEVICE — FORCEPS BX L240CM WRK CHN 2.8MM STD CAP W/ NDL MIC MESH